# Patient Record
Sex: MALE | Race: BLACK OR AFRICAN AMERICAN | NOT HISPANIC OR LATINO | URBAN - METROPOLITAN AREA
[De-identification: names, ages, dates, MRNs, and addresses within clinical notes are randomized per-mention and may not be internally consistent; named-entity substitution may affect disease eponyms.]

---

## 2024-09-02 ENCOUNTER — HOSPITAL ENCOUNTER (INPATIENT)
Facility: HOSPITAL | Age: 44
LOS: 8 days | Discharge: HOME | DRG: 432 | End: 2024-09-10
Attending: EMERGENCY MEDICINE | Admitting: HOSPITALIST
Payer: COMMERCIAL

## 2024-09-02 DIAGNOSIS — K62.5 RECTAL BLEEDING: ICD-10-CM

## 2024-09-02 DIAGNOSIS — N17.9 AKI (ACUTE KIDNEY INJURY): ICD-10-CM

## 2024-09-02 DIAGNOSIS — D62 ABLA (ACUTE BLOOD LOSS ANEMIA): ICD-10-CM

## 2024-09-02 DIAGNOSIS — K70.31 ALCOHOLIC CIRRHOSIS OF LIVER WITH ASCITES: Primary | ICD-10-CM

## 2024-09-02 DIAGNOSIS — R18.8 ABDOMINAL ASCITES: ICD-10-CM

## 2024-09-02 DIAGNOSIS — K65.2 SPONTANEOUS BACTERIAL PERITONITIS: ICD-10-CM

## 2024-09-02 DIAGNOSIS — K76.7 HEPATORENAL SYNDROME: ICD-10-CM

## 2024-09-02 DIAGNOSIS — G40.909 SEIZURE DISORDER: ICD-10-CM

## 2024-09-02 PROBLEM — N18.9 CKD (CHRONIC KIDNEY DISEASE): Status: ACTIVE | Noted: 2024-09-02

## 2024-09-02 PROBLEM — E87.20 METABOLIC ACIDOSIS: Status: ACTIVE | Noted: 2024-09-02

## 2024-09-02 PROBLEM — D64.9 ANEMIA: Status: ACTIVE | Noted: 2024-09-02

## 2024-09-02 LAB
ABO + RH BLD: NORMAL
ALBUMIN SERPL BCP-MCNC: 2.4 G/DL (ref 3.5–5.2)
ALP SERPL-CCNC: 177 U/L (ref 55–135)
ALT SERPL W/O P-5'-P-CCNC: 68 U/L (ref 10–44)
AMMONIA PLAS-SCNC: 52 UMOL/L (ref 10–50)
ANION GAP SERPL CALC-SCNC: 14 MMOL/L (ref 8–16)
ANISOCYTOSIS BLD QL SMEAR: SLIGHT
APTT PPP: 29.3 SEC (ref 21–32)
AST SERPL-CCNC: 111 U/L (ref 10–40)
BASOPHILS # BLD AUTO: 0.05 K/UL (ref 0–0.2)
BASOPHILS NFR BLD: 0.1 % (ref 0–1.9)
BILIRUB SERPL-MCNC: 22.9 MG/DL (ref 0.1–1)
BILIRUB UR QL STRIP: ABNORMAL
BLD GP AB SCN CELLS X3 SERPL QL: NORMAL
BNP SERPL-MCNC: 139 PG/ML (ref 0–99)
BUN SERPL-MCNC: 61 MG/DL (ref 6–20)
BURR CELLS BLD QL SMEAR: ABNORMAL
CALCIUM SERPL-MCNC: 8.7 MG/DL (ref 8.7–10.5)
CHLORIDE SERPL-SCNC: 102 MMOL/L (ref 95–110)
CLARITY UR: CLEAR
CO2 SERPL-SCNC: 18 MMOL/L (ref 23–29)
COLOR UR: YELLOW
CREAT SERPL-MCNC: 4 MG/DL (ref 0.5–1.4)
DIFFERENTIAL METHOD BLD: ABNORMAL
EOSINOPHIL # BLD AUTO: 0.2 K/UL (ref 0–0.5)
EOSINOPHIL NFR BLD: 0.5 % (ref 0–8)
ERYTHROCYTE [DISTWIDTH] IN BLOOD BY AUTOMATED COUNT: 21.9 % (ref 11.5–14.5)
EST. GFR  (NO RACE VARIABLE): 18 ML/MIN/1.73 M^2
ETHANOL SERPL-MCNC: <10 MG/DL
GLUCOSE SERPL-MCNC: 122 MG/DL (ref 70–110)
GLUCOSE SERPL-MCNC: 144 MG/DL (ref 70–110)
GLUCOSE UR QL STRIP: NEGATIVE
HCT VFR BLD AUTO: 24.3 % (ref 40–54)
HGB BLD-MCNC: 8.1 G/DL (ref 14–18)
HGB UR QL STRIP: NEGATIVE
HYPOCHROMIA BLD QL SMEAR: ABNORMAL
IMM GRANULOCYTES # BLD AUTO: 0.35 K/UL (ref 0–0.04)
IMM GRANULOCYTES NFR BLD AUTO: 1 % (ref 0–0.5)
INR PPP: 1.8 (ref 0.8–1.2)
KETONES UR QL STRIP: NEGATIVE
LEUKOCYTE ESTERASE UR QL STRIP: NEGATIVE
LYMPHOCYTES # BLD AUTO: 0.5 K/UL (ref 1–4.8)
LYMPHOCYTES NFR BLD: 1.5 % (ref 18–48)
MCH RBC QN AUTO: 32 PG (ref 27–31)
MCHC RBC AUTO-ENTMCNC: 33.3 G/DL (ref 32–36)
MCV RBC AUTO: 96 FL (ref 82–98)
MONOCYTES # BLD AUTO: 1.5 K/UL (ref 0.3–1)
MONOCYTES NFR BLD: 4.2 % (ref 4–15)
NEUTROPHILS # BLD AUTO: 33.5 K/UL (ref 1.8–7.7)
NEUTROPHILS NFR BLD: 92.7 % (ref 38–73)
NITRITE UR QL STRIP: NEGATIVE
NRBC BLD-RTO: 0 /100 WBC
PH UR STRIP: 6 [PH] (ref 5–8)
PLATELET # BLD AUTO: 163 K/UL (ref 150–450)
PLATELET BLD QL SMEAR: ABNORMAL
PMV BLD AUTO: 11.4 FL (ref 9.2–12.9)
POIKILOCYTOSIS BLD QL SMEAR: SLIGHT
POTASSIUM SERPL-SCNC: 3.7 MMOL/L (ref 3.5–5.1)
PROCALCITONIN SERPL IA-MCNC: 0.87 NG/ML (ref 0–0.5)
PROT SERPL-MCNC: 4.9 G/DL (ref 6–8.4)
PROT UR QL STRIP: ABNORMAL
PROTHROMBIN TIME: 18.2 SEC (ref 9–12.5)
RBC # BLD AUTO: 2.53 M/UL (ref 4.6–6.2)
SCHISTOCYTES BLD QL SMEAR: ABNORMAL
SCHISTOCYTES BLD QL SMEAR: PRESENT
SODIUM SERPL-SCNC: 134 MMOL/L (ref 136–145)
SP GR UR STRIP: 1.01 (ref 1–1.03)
SPECIMEN OUTDATE: NORMAL
SPHEROCYTES BLD QL SMEAR: ABNORMAL
TARGETS BLD QL SMEAR: ABNORMAL
TROPONIN I SERPL DL<=0.01 NG/ML-MCNC: 0.03 NG/ML (ref 0–0.03)
TROPONIN I SERPL HS-MCNC: 33.3 PG/ML (ref 0–14.9)
URN SPEC COLLECT METH UR: ABNORMAL
UROBILINOGEN UR STRIP-ACNC: NEGATIVE EU/DL
WBC # BLD AUTO: 36.13 K/UL (ref 3.9–12.7)

## 2024-09-02 PROCEDURE — 25000003 PHARM REV CODE 250: Performed by: HOSPITALIST

## 2024-09-02 PROCEDURE — 25000003 PHARM REV CODE 250: Performed by: EMERGENCY MEDICINE

## 2024-09-02 PROCEDURE — 83540 ASSAY OF IRON: CPT | Performed by: STUDENT IN AN ORGANIZED HEALTH CARE EDUCATION/TRAINING PROGRAM

## 2024-09-02 PROCEDURE — 99285 EMERGENCY DEPT VISIT HI MDM: CPT | Mod: 25

## 2024-09-02 PROCEDURE — 63600175 PHARM REV CODE 636 W HCPCS: Performed by: STUDENT IN AN ORGANIZED HEALTH CARE EDUCATION/TRAINING PROGRAM

## 2024-09-02 PROCEDURE — 85610 PROTHROMBIN TIME: CPT | Performed by: EMERGENCY MEDICINE

## 2024-09-02 PROCEDURE — 83880 ASSAY OF NATRIURETIC PEPTIDE: CPT | Performed by: EMERGENCY MEDICINE

## 2024-09-02 PROCEDURE — 99900035 HC TECH TIME PER 15 MIN (STAT)

## 2024-09-02 PROCEDURE — 80053 COMPREHEN METABOLIC PANEL: CPT | Performed by: EMERGENCY MEDICINE

## 2024-09-02 PROCEDURE — 93005 ELECTROCARDIOGRAM TRACING: CPT

## 2024-09-02 PROCEDURE — 93010 ELECTROCARDIOGRAM REPORT: CPT | Mod: ,,, | Performed by: GENERAL PRACTICE

## 2024-09-02 PROCEDURE — 85730 THROMBOPLASTIN TIME PARTIAL: CPT | Performed by: EMERGENCY MEDICINE

## 2024-09-02 PROCEDURE — 36415 COLL VENOUS BLD VENIPUNCTURE: CPT | Performed by: EMERGENCY MEDICINE

## 2024-09-02 PROCEDURE — 82607 VITAMIN B-12: CPT | Performed by: STUDENT IN AN ORGANIZED HEALTH CARE EDUCATION/TRAINING PROGRAM

## 2024-09-02 PROCEDURE — 86900 BLOOD TYPING SEROLOGIC ABO: CPT | Performed by: EMERGENCY MEDICINE

## 2024-09-02 PROCEDURE — 84484 ASSAY OF TROPONIN QUANT: CPT | Performed by: EMERGENCY MEDICINE

## 2024-09-02 PROCEDURE — 94799 UNLISTED PULMONARY SVC/PX: CPT

## 2024-09-02 PROCEDURE — 63600175 PHARM REV CODE 636 W HCPCS: Performed by: EMERGENCY MEDICINE

## 2024-09-02 PROCEDURE — 84484 ASSAY OF TROPONIN QUANT: CPT | Mod: 91 | Performed by: EMERGENCY MEDICINE

## 2024-09-02 PROCEDURE — 36415 COLL VENOUS BLD VENIPUNCTURE: CPT | Performed by: STUDENT IN AN ORGANIZED HEALTH CARE EDUCATION/TRAINING PROGRAM

## 2024-09-02 PROCEDURE — 20000000 HC ICU ROOM

## 2024-09-02 PROCEDURE — 81003 URINALYSIS AUTO W/O SCOPE: CPT | Performed by: EMERGENCY MEDICINE

## 2024-09-02 PROCEDURE — 85025 COMPLETE CBC W/AUTO DIFF WBC: CPT | Performed by: EMERGENCY MEDICINE

## 2024-09-02 PROCEDURE — 87040 BLOOD CULTURE FOR BACTERIA: CPT | Mod: 59 | Performed by: EMERGENCY MEDICINE

## 2024-09-02 PROCEDURE — 86901 BLOOD TYPING SEROLOGIC RH(D): CPT | Performed by: EMERGENCY MEDICINE

## 2024-09-02 PROCEDURE — 82077 ASSAY SPEC XCP UR&BREATH IA: CPT | Performed by: EMERGENCY MEDICINE

## 2024-09-02 PROCEDURE — 99900031 HC PATIENT EDUCATION (STAT)

## 2024-09-02 PROCEDURE — 82728 ASSAY OF FERRITIN: CPT | Performed by: STUDENT IN AN ORGANIZED HEALTH CARE EDUCATION/TRAINING PROGRAM

## 2024-09-02 PROCEDURE — 96365 THER/PROPH/DIAG IV INF INIT: CPT

## 2024-09-02 PROCEDURE — 84484 ASSAY OF TROPONIN QUANT: CPT | Mod: 91 | Performed by: STUDENT IN AN ORGANIZED HEALTH CARE EDUCATION/TRAINING PROGRAM

## 2024-09-02 PROCEDURE — P9047 ALBUMIN (HUMAN), 25%, 50ML: HCPCS | Mod: JZ,JG | Performed by: STUDENT IN AN ORGANIZED HEALTH CARE EDUCATION/TRAINING PROGRAM

## 2024-09-02 PROCEDURE — 82140 ASSAY OF AMMONIA: CPT | Performed by: EMERGENCY MEDICINE

## 2024-09-02 PROCEDURE — 94761 N-INVAS EAR/PLS OXIMETRY MLT: CPT

## 2024-09-02 PROCEDURE — 84145 PROCALCITONIN (PCT): CPT | Performed by: EMERGENCY MEDICINE

## 2024-09-02 PROCEDURE — 82746 ASSAY OF FOLIC ACID SERUM: CPT | Performed by: STUDENT IN AN ORGANIZED HEALTH CARE EDUCATION/TRAINING PROGRAM

## 2024-09-02 PROCEDURE — 25000003 PHARM REV CODE 250: Performed by: STUDENT IN AN ORGANIZED HEALTH CARE EDUCATION/TRAINING PROGRAM

## 2024-09-02 RX ORDER — ALBUMIN HUMAN 250 G/1000ML
25 SOLUTION INTRAVENOUS ONCE
Status: COMPLETED | OUTPATIENT
Start: 2024-09-02 | End: 2024-09-02

## 2024-09-02 RX ORDER — ONDANSETRON HYDROCHLORIDE 2 MG/ML
4 INJECTION, SOLUTION INTRAVENOUS EVERY 6 HOURS PRN
Status: DISCONTINUED | OUTPATIENT
Start: 2024-09-02 | End: 2024-09-10 | Stop reason: HOSPADM

## 2024-09-02 RX ORDER — SODIUM,POTASSIUM PHOSPHATES 280-250MG
2 POWDER IN PACKET (EA) ORAL
Status: DISCONTINUED | OUTPATIENT
Start: 2024-09-02 | End: 2024-09-10 | Stop reason: HOSPADM

## 2024-09-02 RX ORDER — LACTULOSE 10 G/15ML
20 SOLUTION ORAL 3 TIMES DAILY
Status: DISCONTINUED | OUTPATIENT
Start: 2024-09-02 | End: 2024-09-10 | Stop reason: HOSPADM

## 2024-09-02 RX ORDER — THIAMINE HCL 100 MG
100 TABLET ORAL DAILY
Status: DISCONTINUED | OUTPATIENT
Start: 2024-09-03 | End: 2024-09-10 | Stop reason: HOSPADM

## 2024-09-02 RX ORDER — ALBUMIN HUMAN 250 G/1000ML
25 SOLUTION INTRAVENOUS ONCE
Status: DISCONTINUED | OUTPATIENT
Start: 2024-09-02 | End: 2024-09-02

## 2024-09-02 RX ORDER — MIDODRINE HYDROCHLORIDE 10 MG/1
10 TABLET ORAL EVERY 8 HOURS
Status: DISCONTINUED | OUTPATIENT
Start: 2024-09-02 | End: 2024-09-10 | Stop reason: HOSPADM

## 2024-09-02 RX ORDER — MUPIROCIN 20 MG/G
OINTMENT TOPICAL 2 TIMES DAILY
Status: DISPENSED | OUTPATIENT
Start: 2024-09-02 | End: 2024-09-07

## 2024-09-02 RX ORDER — PANTOPRAZOLE SODIUM 40 MG/10ML
40 INJECTION, POWDER, LYOPHILIZED, FOR SOLUTION INTRAVENOUS 2 TIMES DAILY
Status: DISCONTINUED | OUTPATIENT
Start: 2024-09-02 | End: 2024-09-05

## 2024-09-02 RX ORDER — PREDNISONE 20 MG/1
40 TABLET ORAL DAILY
Status: DISCONTINUED | OUTPATIENT
Start: 2024-09-03 | End: 2024-09-10 | Stop reason: HOSPADM

## 2024-09-02 RX ORDER — LANOLIN ALCOHOL/MO/W.PET/CERES
800 CREAM (GRAM) TOPICAL
Status: DISCONTINUED | OUTPATIENT
Start: 2024-09-02 | End: 2024-09-10 | Stop reason: HOSPADM

## 2024-09-02 RX ORDER — FOLIC ACID 1 MG/1
1 TABLET ORAL DAILY
Status: DISCONTINUED | OUTPATIENT
Start: 2024-09-03 | End: 2024-09-10 | Stop reason: HOSPADM

## 2024-09-02 RX ORDER — SODIUM BICARBONATE 650 MG/1
650 TABLET ORAL 3 TIMES DAILY
Status: DISCONTINUED | OUTPATIENT
Start: 2024-09-02 | End: 2024-09-08

## 2024-09-02 RX ORDER — LEVETIRACETAM 5 MG/ML
500 INJECTION INTRAVASCULAR EVERY 12 HOURS
Status: DISCONTINUED | OUTPATIENT
Start: 2024-09-02 | End: 2024-09-05

## 2024-09-02 RX ORDER — OCTREOTIDE ACETATE 100 UG/ML
100 INJECTION, SOLUTION INTRAVENOUS; SUBCUTANEOUS EVERY 8 HOURS
Status: DISCONTINUED | OUTPATIENT
Start: 2024-09-02 | End: 2024-09-05

## 2024-09-02 RX ORDER — OXYCODONE HYDROCHLORIDE 5 MG/1
5 TABLET ORAL EVERY 4 HOURS PRN
Status: DISCONTINUED | OUTPATIENT
Start: 2024-09-02 | End: 2024-09-10 | Stop reason: HOSPADM

## 2024-09-02 RX ORDER — ALBUMIN HUMAN 250 G/1000ML
25 SOLUTION INTRAVENOUS 2 TIMES DAILY
Status: DISCONTINUED | OUTPATIENT
Start: 2024-09-03 | End: 2024-09-10 | Stop reason: HOSPADM

## 2024-09-02 RX ADMIN — PANTOPRAZOLE SODIUM 40 MG: 40 INJECTION, POWDER, FOR SOLUTION INTRAVENOUS at 10:09

## 2024-09-02 RX ADMIN — OCTREOTIDE ACETATE 100 MCG: 100 INJECTION, SOLUTION INTRAVENOUS; SUBCUTANEOUS at 10:09

## 2024-09-02 RX ADMIN — MIDODRINE HYDROCHLORIDE 10 MG: 10 TABLET ORAL at 10:09

## 2024-09-02 RX ADMIN — CEFTRIAXONE 1 G: 1 INJECTION, POWDER, FOR SOLUTION INTRAMUSCULAR; INTRAVENOUS at 05:09

## 2024-09-02 RX ADMIN — MUPIROCIN 1 G: 20 OINTMENT TOPICAL at 10:09

## 2024-09-02 RX ADMIN — SODIUM BICARBONATE 650 MG TABLET 650 MG: at 10:09

## 2024-09-02 RX ADMIN — ALBUMIN (HUMAN) 25 G: 12.5 SOLUTION INTRAVENOUS at 10:09

## 2024-09-02 RX ADMIN — LEVETIRACETAM INJECTION 500 MG: 5 INJECTION INTRAVENOUS at 10:09

## 2024-09-02 NOTE — ED PROVIDER NOTES
Encounter Date: 9/2/2024       History     Chief Complaint   Patient presents with    Jaundice    Rectal Bleeding     44-year-old male with history of alcoholic cirrhotic liver disease, long-term alcohol abuse last drink states was a proximally 1-1/2 months ago, seizure disorder, iron-deficiency anemia, chronic blood loss, chronic kidney disease, hepatorenal syndrome, patient last hospital admission at [facility] on 08/28/2024 4 GI bleed at that time underwent 1 unit of packed red blood cells.  Patient did undergo discussion of tips procedure which is still pending.  Patient returns to the emergency department today due to his family was concerned that he was becoming more jaundice and having abdominal distention.  Patient has had intermittent rectal bleeding last time he had bleeding was proximally 1 week ago.  Came to the emergency department today for further evaluation.  Denies any abdominal pain, no fever, no hematemesis, no hematochezia no rectal bleeding or melena at this time.      Review of patient's allergies indicates:  No Known Allergies  No past medical history on file.  No past surgical history on file.  No family history on file.     Review of Systems   Constitutional:  Positive for fatigue. Negative for fever.   HENT:  Negative for sore throat.    Respiratory:  Negative for shortness of breath.    Cardiovascular:  Negative for chest pain.   Gastrointestinal:  Positive for abdominal distention. Negative for abdominal pain, nausea and vomiting.   Genitourinary:  Negative for dysuria.   Musculoskeletal:  Negative for back pain.   Skin:  Negative for rash.   Neurological:  Positive for weakness. Negative for dizziness and headaches.   Hematological:  Does not bruise/bleed easily.       Physical Exam     Initial Vitals [09/02/24 1447]   BP Pulse Resp Temp SpO2   108/74 (!) 160 18 96.5 °F (35.8 °C) 96 %      MAP       --         Physical Exam    Nursing note and vitals reviewed.  Constitutional: He  appears well-developed and well-nourished.   Frail appearing male, no acute distress with jaundice   HENT:   Head: Normocephalic and atraumatic.   Nose: Nose normal.   Mouth/Throat: Oropharynx is clear and moist.   Eyes: Conjunctivae and EOM are normal. Pupils are equal, round, and reactive to light. Right eye exhibits no discharge. Left eye exhibits no discharge. No scleral icterus.   Neck: Neck supple.   Normal range of motion.  Cardiovascular:  Normal rate, regular rhythm, normal heart sounds and intact distal pulses.     Exam reveals no gallop and no friction rub.       No murmur heard.  Pulmonary/Chest: No stridor. No respiratory distress.   Course bilateral breath sounds no adventitious sounds   Abdominal: Abdomen is soft. Bowel sounds are normal. He exhibits distension. He exhibits no mass. There is no abdominal tenderness.   Markedly tense abdominal ascites with marked abdominal distention.  No rebound, no guarding noted.  Normal Bowel sounds There is no rebound and no guarding.   Musculoskeletal:         General: No edema. Normal range of motion.      Cervical back: Normal range of motion and neck supple.     Lymphadenopathy:     He has no cervical adenopathy.   Neurological: He is alert and oriented to person, place, and time. He has normal strength and normal reflexes. No cranial nerve deficit or sensory deficit. GCS score is 15. GCS eye subscore is 4. GCS verbal subscore is 5. GCS motor subscore is 6.   Skin: Skin is warm and dry. Capillary refill takes less than 2 seconds. No rash noted.   Psychiatric: He has a normal mood and affect. His behavior is normal. Judgment and thought content normal.         ED Course   Procedures  Labs Reviewed   CBC W/ AUTO DIFFERENTIAL - Abnormal       Result Value    WBC 36.13 (*)     RBC 2.53 (*)     Hemoglobin 8.1 (*)     Hematocrit 24.3 (*)     MCV 96      MCH 32.0 (*)     MCHC 33.3      RDW 21.9 (*)     Platelets 163      MPV 11.4      Immature Granulocytes 1.0 (*)      Gran # (ANC) 33.5 (*)     Immature Grans (Abs) 0.35 (*)     Lymph # 0.5 (*)     Mono # 1.5 (*)     Eos # 0.2      Baso # 0.05      nRBC 0      Gran % 92.7 (*)     Lymph % 1.5 (*)     Mono % 4.2      Eosinophil % 0.5      Basophil % 0.1      Platelet Estimate Appears normal      Aniso Slight      Poik Slight      Hypo Occasional      Target Cells Occasional      [place] Cells Occasional      Spherocytes Occasional      Schistocytes Present      Fragmented Cells Occasional      Differential Method Automated      Narrative:      WBC  critical result(s) called and verbal readback obtained from   Brett Mehta by BTI1 09/02/2024 15:52   COMPREHENSIVE METABOLIC PANEL - Abnormal    Sodium 134 (*)     Potassium 3.7      Chloride 102      CO2 18 (*)     Glucose 122 (*)     BUN 61 (*)     Creatinine 4.0 (*)     Calcium 8.7      Total Protein 4.9 (*)     Albumin 2.4 (*)     Total Bilirubin 22.9 (*)     Alkaline Phosphatase 177 (*)      (*)     ALT 68 (*)     eGFR 18 (*)     Anion Gap 14     TROPONIN I - Abnormal    Troponin I 0.030 (*)    B-TYPE NATRIURETIC PEPTIDE - Abnormal     (*)    PROTIME-INR - Abnormal    Prothrombin Time 18.2 (*)     INR 1.8 (*)    AMMONIA - Abnormal    Ammonia 52 (*)    PROCALCITONIN - Abnormal    Procalcitonin 0.87 (*)    CULTURE, BLOOD   CULTURE, BLOOD   APTT    aPTT 29.3     ALCOHOL,MEDICAL (ETHANOL)    Alcohol, Serum <10     URINALYSIS, REFLEX TO URINE CULTURE   TROPONIN I   TYPE & SCREEN    Group & Rh A POS      Indirect Amanda NEG      Specimen Outdate 09/05/2024 23:59     POCT LACTATE          Imaging Results              US Abdomen Limited (Final result)  Result time 09/02/24 16:47:38   Procedure changed from US Abdomen Complete     Final result by Reva Méndez MD (09/02/24 16:47:38)                   Impression:      As above      Electronically signed by: Reva Méndez MD  Date:    09/02/2024  Time:    16:47               Narrative:    EXAMINATION:  US ABDOMEN  LIMITED    CLINICAL HISTORY:  ascites; Other ascites    TECHNIQUE:  Limited ultrasound of the abdomen for ascites    COMPARISON:  None.    FINDINGS:  There is ascites seen in all 4 quadrants with moderately large amount ascites seen.  .                                       CT Abdomen Pelvis  Without Contrast (Final result)  Result time 09/02/24 16:44:36      Final result by Reva Méndez MD (09/02/24 16:44:36)                   Impression:      Features of cirrhosis with ascites and nodular contour of the liver.  Mild diffuse bladder wall thickening versus incomplete distention recommend correlation clinically if clinical consideration for cystitis or chronic bladder outlet obstruction.  Diverticulosis without CT findings of acute diverticulitis.  Findings detailed above.      Electronically signed by: Reva Méndez MD  Date:    09/02/2024  Time:    16:44               Narrative:    EXAMINATION:  CT ABDOMEN PELVIS WITHOUT CONTRAST    CLINICAL HISTORY:  abdominal distention;    TECHNIQUE:  Low dose axial images, sagittal and coronal reformations were obtained from the lung bases to the pubic symphysis.  No p.o. contrast    COMPARISON:  None    FINDINGS:  Mild dependent hypoventilatory changes in visualized lung bases    Large volume of ascites.  Nodular contour the liver suggesting cirrhosis.  Suggestion of recanalization of the umbilical vein.  Spleen not enlarged. Splenule noted. Adrenal glands; slight diffuse thickening.    Pancreas unremarkable    Abdominal aorta no aneurysm    No calcified stones the gallbladder or CT findings of acute cholecystitis or biliary duct dilatation    Diverticulosis without CT findings of acute diverticulitis. Appendix; normal appearing appendix is thought to be visualized.    Kidney; no hydronephrosis or opaque renal stone.  No ureteral dilatation, opaque ureteral stone or ureteral obstruction evident.  The urinary bladder is mildly distended at time of the exam. Wall appears  mildly diffusely thickened recommend correlation clinically if clinical consideration for cystitis or chronic bladder outlet obstruction.  The prostate gland does not appear enlarged. There is small left inguinal hernia containing ascites and fat.    Subcutaneous edema.                                       Medications   cefTRIAXone (Rocephin) 1 g in D5W 100 mL IVPB (MB+) (0 g Intravenous Stopped 9/2/24 1740)     Medical Decision Making  Amount and/or Complexity of Data Reviewed  Labs: ordered.  Radiology: ordered.    Risk  Decision regarding hospitalization.              Attending Attestation:   Physician Attestation Statement for Resident:  As the supervising MD  .  -: 44-year-old male with history of alcoholic cirrhotic liver disease, long-term alcohol abuse last drink states was a proximally 1-1/2 months ago, seizure disorder, iron-deficiency anemia, chronic blood loss, chronic kidney disease, hepatorenal syndrome, patient last hospital admission at [facility] on 08/28/2024 4 GI bleed at that time underwent 1 unit of packed red blood cells.  Patient did undergo discussion of tips procedure which is still pending.  Patient returns to the emergency department today due to his family was concerned that he was becoming more jaundice and having abdominal distention.  Patient has had intermittent rectal bleeding last time he had bleeding was proximally 1 week ago.  Came to the emergency department today for further evaluation.  Denies any abdominal pain, no fever, no hematemesis, no hematochezia no rectal bleeding or melena at this time.       -: End-stage liver disease, alcoholic cirrhotic liver disease, ascites with abdominal distention, worsening anemia/symptomatic anemia            Attending Critical Care:   Critical Care Times:   Direct Patient Care (initial evaluation, reassessments, and time considering the case)................................................................30 minutes.   Additional  History from reviewing old medical records or taking additional history from the family, EMS, PCP, etc.......................5 minutes.   Ordering, Reviewing, and Interpreting Diagnostic Studies...............................................................................................................5 minutes.   Documentation..................................................................................................................................................................................5 minutes.   Consultation with other Physicians. .................................................................................................................................................5 minutes.   Consultation with the patient's family directly relating to the patient's condition, care, and DNR status (when patient unable)......5 minutes.   Other..................................................................................................................................................................................................5 minutes.   ==============================================================  Total Critical Care Time - exclusive of procedural time: 60 minutes.  ==============================================================  Critical care was necessary to treat or prevent imminent or life-threatening deterioration of the following conditions: hepatic failure.   Critical care was time spent personally by me on the following activities: obtaining history from patient or relative, review of old charts, review of x-rays / CT sent with the patient, examination of patient, ordering lab, x-rays, and/or EKG, development of treatment plan with patient or relative, ordering and performing treatments and interventions, evaluation of patient's response to treatment, discussion with consultants and re-evaluation of patient's conition.   Critical Care Condition: potentially life-threatening           ED  Course as of 09/02/24 1757   Mon Sep 02, 2024   1752 Patient seen evaluated emergency department.  Currently at this time patient found with leukocytosis and increasing abdominal ascites.  Patient here with generalized fatigue.  White count found to be 36 1000, with H&H 8 and 24 platelet count of 163.  Profile 0.87.  BUN 61, creatinine 4.0 bilirubin 22 ALT 68  alk-phos 177 ammonia 52 INR 1.8.  CT abdomen pelvis with chronic cirrhotic liver disease finding.  Ultrasound with abdominal ascites.  Case discussed with Gastroenterology at [facility] Dr. Gaitan.  Recommends patient be transferred to [facility] for interventional radiology paracentesis, broad-spectrum IV antibiotic therapy as well as albumin infusion.  Acceptance has been granted by Dr. Dias.  Currently awaiting .  [RM]      ED Course User Index  [RM] Balbir Courtney MD               Medical Decision Making:   Initial Assessment:   44-year-old male with history of alcoholic cirrhotic liver disease, long-term alcohol abuse last drink states was a proximally 1-1/2 months ago, seizure disorder, iron-deficiency anemia, chronic blood loss, chronic kidney disease, hepatorenal syndrome, patient last hospital admission at [facility] on 08/28/2024 4 GI bleed at that time underwent 1 unit of packed red blood cells.  Patient did undergo discussion of tips procedure which is still pending.  Patient returns to the emergency department today due to his family was concerned that he was becoming more jaundice and having abdominal distention.  Patient has had intermittent rectal bleeding last time he had bleeding was proximally 1 week ago.  Came to the emergency department today for further evaluation.  Denies any abdominal pain, no fever, no hematemesis, no hematochezia no rectal bleeding or melena at this time.    Differential Diagnosis:   Spontaneous bacterial peritonitis, hepatorenal syndrome, GI bleed, liver  "failure  Clinical Tests:   Lab Tests: Ordered and Reviewed  Radiological Study: Ordered and Reviewed  Medical Tests: Ordered and Reviewed  Sepsis Perfusion Assessment: "I attest a sepsis perfusion exam was performed within 6 hours of sepsis, severe sepsis, or septic shock presentation, following fluid resuscitation."             Clinical Impression:  Final diagnoses:  [K70.31] Alcoholic cirrhosis of liver with ascites (Primary)  [R18.8] Abdominal ascites  [K65.2] Spontaneous bacterial peritonitis  [K76.7] Hepatorenal syndrome          ED Disposition Condition    Admit Stable                Balbir Courtney MD  09/02/24 1536       Balbir Courtney MD  09/02/24 9676    "

## 2024-09-03 ENCOUNTER — ANESTHESIA (OUTPATIENT)
Dept: SURGERY | Facility: HOSPITAL | Age: 44
End: 2024-09-03
Payer: COMMERCIAL

## 2024-09-03 ENCOUNTER — ANESTHESIA EVENT (OUTPATIENT)
Dept: SURGERY | Facility: HOSPITAL | Age: 44
End: 2024-09-03
Payer: COMMERCIAL

## 2024-09-03 VITALS
SYSTOLIC BLOOD PRESSURE: 109 MMHG | HEART RATE: 66 BPM | RESPIRATION RATE: 11 BRPM | OXYGEN SATURATION: 100 % | DIASTOLIC BLOOD PRESSURE: 75 MMHG

## 2024-09-03 PROBLEM — K76.82 HEPATIC ENCEPHALOPATHY: Status: ACTIVE | Noted: 2024-09-03

## 2024-09-03 LAB
ABO + RH BLD: NORMAL
ALBUMIN FLD-MCNC: <1.5 G/DL
ALBUMIN SERPL BCP-MCNC: 2.7 G/DL (ref 3.5–5.2)
ALP SERPL-CCNC: 120 U/L (ref 55–135)
ALT SERPL W/O P-5'-P-CCNC: 50 U/L (ref 10–44)
AMMONIA PLAS-SCNC: 62 UMOL/L (ref 10–50)
ANION GAP SERPL CALC-SCNC: 11 MMOL/L (ref 8–16)
ANISOCYTOSIS BLD QL SMEAR: SLIGHT
APPEARANCE FLD: CLEAR
AST SERPL-CCNC: 74 U/L (ref 10–40)
BASOPHILS # BLD AUTO: 0.03 K/UL (ref 0–0.2)
BASOPHILS # BLD AUTO: 0.04 K/UL (ref 0–0.2)
BASOPHILS NFR BLD: 0.1 % (ref 0–1.9)
BASOPHILS NFR BLD: 0.1 % (ref 0–1.9)
BILIRUB SERPL-MCNC: 20.3 MG/DL (ref 0.1–1)
BLD GP AB SCN CELLS X3 SERPL QL: NORMAL
BLD PROD TYP BPU: NORMAL
BLD PROD TYP BPU: NORMAL
BLOOD UNIT EXPIRATION DATE: NORMAL
BLOOD UNIT EXPIRATION DATE: NORMAL
BLOOD UNIT TYPE CODE: 6200
BLOOD UNIT TYPE CODE: 6200
BLOOD UNIT TYPE: NORMAL
BLOOD UNIT TYPE: NORMAL
BODY FLD TYPE: NORMAL
BUN SERPL-MCNC: 64 MG/DL (ref 6–20)
CALCIUM SERPL-MCNC: 7.8 MG/DL (ref 8.7–10.5)
CHLORIDE SERPL-SCNC: 103 MMOL/L (ref 95–110)
CO2 SERPL-SCNC: 19 MMOL/L (ref 23–29)
CODING SYSTEM: NORMAL
CODING SYSTEM: NORMAL
COLOR FLD: YELLOW
CREAT SERPL-MCNC: 3.9 MG/DL (ref 0.5–1.4)
CREAT UR-MCNC: 83.2 MG/DL (ref 23–375)
CROSSMATCH INTERPRETATION: NORMAL
CROSSMATCH INTERPRETATION: NORMAL
DIFFERENTIAL METHOD BLD: ABNORMAL
DIFFERENTIAL METHOD BLD: ABNORMAL
DISPENSE STATUS: NORMAL
DISPENSE STATUS: NORMAL
EOSINOPHIL # BLD AUTO: 0 K/UL (ref 0–0.5)
EOSINOPHIL # BLD AUTO: 0 K/UL (ref 0–0.5)
EOSINOPHIL NFR BLD: 0 % (ref 0–8)
EOSINOPHIL NFR BLD: 0.1 % (ref 0–8)
ERYTHROCYTE [DISTWIDTH] IN BLOOD BY AUTOMATED COUNT: 20.7 % (ref 11.5–14.5)
ERYTHROCYTE [DISTWIDTH] IN BLOOD BY AUTOMATED COUNT: 21.8 % (ref 11.5–14.5)
EST. GFR  (NO RACE VARIABLE): 18.6 ML/MIN/1.73 M^2
FERRITIN SERPL-MCNC: 268.8 NG/ML (ref 20–300)
FOLATE SERPL-MCNC: 20.9 NG/ML (ref 4–24)
GLUCOSE SERPL-MCNC: 158 MG/DL (ref 70–110)
HCT VFR BLD AUTO: 19 % (ref 40–54)
HCT VFR BLD AUTO: 27.5 % (ref 40–54)
HGB BLD-MCNC: 6.2 G/DL (ref 14–18)
HGB BLD-MCNC: 9.2 G/DL (ref 14–18)
HYPOCHROMIA BLD QL SMEAR: ABNORMAL
IMM GRANULOCYTES # BLD AUTO: 0.23 K/UL (ref 0–0.04)
IMM GRANULOCYTES # BLD AUTO: 0.36 K/UL (ref 0–0.04)
IMM GRANULOCYTES NFR BLD AUTO: 0.7 % (ref 0–0.5)
IMM GRANULOCYTES NFR BLD AUTO: 1 % (ref 0–0.5)
INR PPP: 1.9 (ref 0.8–1.2)
IRON SERPL-MCNC: 37 UG/DL (ref 45–160)
LYMPHOCYTES # BLD AUTO: 0.4 K/UL (ref 1–4.8)
LYMPHOCYTES # BLD AUTO: 0.6 K/UL (ref 1–4.8)
LYMPHOCYTES NFR BLD: 1.2 % (ref 18–48)
LYMPHOCYTES NFR BLD: 1.8 % (ref 18–48)
LYMPHOCYTES NFR FLD MANUAL: 22 %
MCH RBC QN AUTO: 30.8 PG (ref 27–31)
MCH RBC QN AUTO: 32 PG (ref 27–31)
MCHC RBC AUTO-ENTMCNC: 32.6 G/DL (ref 32–36)
MCHC RBC AUTO-ENTMCNC: 33.5 G/DL (ref 32–36)
MCV RBC AUTO: 92 FL (ref 82–98)
MCV RBC AUTO: 98 FL (ref 82–98)
MESOTHL CELL NFR FLD MANUAL: 7 %
MONOCYTES # BLD AUTO: 0.9 K/UL (ref 0.3–1)
MONOCYTES # BLD AUTO: 1.2 K/UL (ref 0.3–1)
MONOCYTES NFR BLD: 2.6 % (ref 4–15)
MONOCYTES NFR BLD: 3.8 % (ref 4–15)
MONOS+MACROS NFR FLD MANUAL: 50 %
NEUTROPHILS # BLD AUTO: 29.5 K/UL (ref 1.8–7.7)
NEUTROPHILS # BLD AUTO: 33.2 K/UL (ref 1.8–7.7)
NEUTROPHILS NFR BLD: 93.5 % (ref 38–73)
NEUTROPHILS NFR BLD: 95.1 % (ref 38–73)
NEUTROPHILS NFR FLD MANUAL: 21 %
NRBC BLD-RTO: 0 /100 WBC
NRBC BLD-RTO: 0 /100 WBC
NUM UNITS TRANS PACKED RBC: NORMAL
NUM UNITS TRANS PACKED RBC: NORMAL
OHS QRS DURATION: 88 MS
OHS QTC CALCULATION: 420 MS
PLATELET # BLD AUTO: 117 K/UL (ref 150–450)
PLATELET # BLD AUTO: 124 K/UL (ref 150–450)
PMV BLD AUTO: 11.9 FL (ref 9.2–12.9)
PMV BLD AUTO: 12 FL (ref 9.2–12.9)
POIKILOCYTOSIS BLD QL SMEAR: SLIGHT
POTASSIUM SERPL-SCNC: 4.1 MMOL/L (ref 3.5–5.1)
PROT SERPL-MCNC: 4 G/DL (ref 6–8.4)
PROT UR-MCNC: 37 MG/DL (ref 6–15)
PROT/CREAT UR: 0.44 MG/G{CREAT} (ref 0–0.2)
PROTHROMBIN TIME: 19.9 SEC (ref 9–12.5)
RBC # BLD AUTO: 1.94 M/UL (ref 4.6–6.2)
RBC # BLD AUTO: 2.99 M/UL (ref 4.6–6.2)
SATURATED IRON: ABNORMAL % (ref 20–50)
SCHISTOCYTES BLD QL SMEAR: PRESENT
SODIUM SERPL-SCNC: 133 MMOL/L (ref 136–145)
SODIUM UR-SCNC: 33 MMOL/L (ref 20–250)
SODIUM UR-SCNC: 33 MMOL/L (ref 20–250)
SPECIMEN OUTDATE: NORMAL
SPECIMEN SOURCE: 0
TOTAL IRON BINDING CAPACITY: ABNORMAL UG/DL (ref 250–450)
TRANSFERRIN SERPL-MCNC: <75 MG/DL (ref 200–375)
TROPONIN I SERPL HS-MCNC: 31.8 PG/ML (ref 0–14.9)
UUN UR-MCNC: 758 MG/DL (ref 140–1050)
VIT B12 SERPL-MCNC: 1446 PG/ML (ref 210–950)
WBC # BLD AUTO: 31.56 K/UL (ref 3.9–12.7)
WBC # BLD AUTO: 34.93 K/UL (ref 3.9–12.7)
WBC # FLD: 17 /CU MM

## 2024-09-03 PROCEDURE — 85025 COMPLETE CBC W/AUTO DIFF WBC: CPT | Performed by: STUDENT IN AN ORGANIZED HEALTH CARE EDUCATION/TRAINING PROGRAM

## 2024-09-03 PROCEDURE — 87205 SMEAR GRAM STAIN: CPT | Performed by: STUDENT IN AN ORGANIZED HEALTH CARE EDUCATION/TRAINING PROGRAM

## 2024-09-03 PROCEDURE — 0DJD8ZZ INSPECTION OF LOWER INTESTINAL TRACT, VIA NATURAL OR ARTIFICIAL OPENING ENDOSCOPIC: ICD-10-PCS | Performed by: INTERNAL MEDICINE

## 2024-09-03 PROCEDURE — 36415 COLL VENOUS BLD VENIPUNCTURE: CPT | Performed by: STUDENT IN AN ORGANIZED HEALTH CARE EDUCATION/TRAINING PROGRAM

## 2024-09-03 PROCEDURE — 82042 OTHER SOURCE ALBUMIN QUAN EA: CPT | Performed by: STUDENT IN AN ORGANIZED HEALTH CARE EDUCATION/TRAINING PROGRAM

## 2024-09-03 PROCEDURE — 45330 DIAGNOSTIC SIGMOIDOSCOPY: CPT | Performed by: INTERNAL MEDICINE

## 2024-09-03 PROCEDURE — 25000003 PHARM REV CODE 250: Performed by: HOSPITALIST

## 2024-09-03 PROCEDURE — 25000003 PHARM REV CODE 250: Performed by: RADIOLOGY

## 2024-09-03 PROCEDURE — 25000003 PHARM REV CODE 250: Performed by: NURSE ANESTHETIST, CERTIFIED REGISTERED

## 2024-09-03 PROCEDURE — P9047 ALBUMIN (HUMAN), 25%, 50ML: HCPCS | Mod: JZ,JG | Performed by: STUDENT IN AN ORGANIZED HEALTH CARE EDUCATION/TRAINING PROGRAM

## 2024-09-03 PROCEDURE — 20000000 HC ICU ROOM

## 2024-09-03 PROCEDURE — 89051 BODY FLUID CELL COUNT: CPT | Performed by: STUDENT IN AN ORGANIZED HEALTH CARE EDUCATION/TRAINING PROGRAM

## 2024-09-03 PROCEDURE — 87075 CULTR BACTERIA EXCEPT BLOOD: CPT | Performed by: STUDENT IN AN ORGANIZED HEALTH CARE EDUCATION/TRAINING PROGRAM

## 2024-09-03 PROCEDURE — 84540 ASSAY OF URINE/UREA-N: CPT | Performed by: STUDENT IN AN ORGANIZED HEALTH CARE EDUCATION/TRAINING PROGRAM

## 2024-09-03 PROCEDURE — 86920 COMPATIBILITY TEST SPIN: CPT | Performed by: HOSPITALIST

## 2024-09-03 PROCEDURE — 80053 COMPREHEN METABOLIC PANEL: CPT | Performed by: STUDENT IN AN ORGANIZED HEALTH CARE EDUCATION/TRAINING PROGRAM

## 2024-09-03 PROCEDURE — 36415 COLL VENOUS BLD VENIPUNCTURE: CPT | Performed by: HOSPITALIST

## 2024-09-03 PROCEDURE — 36415 COLL VENOUS BLD VENIPUNCTURE: CPT | Performed by: INTERNAL MEDICINE

## 2024-09-03 PROCEDURE — 36430 TRANSFUSION BLD/BLD COMPNT: CPT

## 2024-09-03 PROCEDURE — 63600175 PHARM REV CODE 636 W HCPCS: Performed by: NURSE ANESTHETIST, CERTIFIED REGISTERED

## 2024-09-03 PROCEDURE — 86850 RBC ANTIBODY SCREEN: CPT | Performed by: HOSPITALIST

## 2024-09-03 PROCEDURE — 82140 ASSAY OF AMMONIA: CPT | Performed by: STUDENT IN AN ORGANIZED HEALTH CARE EDUCATION/TRAINING PROGRAM

## 2024-09-03 PROCEDURE — 85610 PROTHROMBIN TIME: CPT | Performed by: STUDENT IN AN ORGANIZED HEALTH CARE EDUCATION/TRAINING PROGRAM

## 2024-09-03 PROCEDURE — 85025 COMPLETE CBC W/AUTO DIFF WBC: CPT | Mod: 91 | Performed by: INTERNAL MEDICINE

## 2024-09-03 PROCEDURE — 82570 ASSAY OF URINE CREATININE: CPT | Performed by: STUDENT IN AN ORGANIZED HEALTH CARE EDUCATION/TRAINING PROGRAM

## 2024-09-03 PROCEDURE — 86900 BLOOD TYPING SEROLOGIC ABO: CPT | Performed by: HOSPITALIST

## 2024-09-03 PROCEDURE — 87070 CULTURE OTHR SPECIMN AEROBIC: CPT | Performed by: STUDENT IN AN ORGANIZED HEALTH CARE EDUCATION/TRAINING PROGRAM

## 2024-09-03 PROCEDURE — 25000003 PHARM REV CODE 250: Performed by: STUDENT IN AN ORGANIZED HEALTH CARE EDUCATION/TRAINING PROGRAM

## 2024-09-03 PROCEDURE — 37000008 HC ANESTHESIA 1ST 15 MINUTES: Performed by: INTERNAL MEDICINE

## 2024-09-03 PROCEDURE — 84300 ASSAY OF URINE SODIUM: CPT | Performed by: STUDENT IN AN ORGANIZED HEALTH CARE EDUCATION/TRAINING PROGRAM

## 2024-09-03 PROCEDURE — 0W9G3ZX DRAINAGE OF PERITONEAL CAVITY, PERCUTANEOUS APPROACH, DIAGNOSTIC: ICD-10-PCS | Performed by: INTERNAL MEDICINE

## 2024-09-03 PROCEDURE — 30233N1 TRANSFUSION OF NONAUTOLOGOUS RED BLOOD CELLS INTO PERIPHERAL VEIN, PERCUTANEOUS APPROACH: ICD-10-PCS | Performed by: INTERNAL MEDICINE

## 2024-09-03 PROCEDURE — 63600175 PHARM REV CODE 636 W HCPCS: Performed by: STUDENT IN AN ORGANIZED HEALTH CARE EDUCATION/TRAINING PROGRAM

## 2024-09-03 PROCEDURE — 94761 N-INVAS EAR/PLS OXIMETRY MLT: CPT

## 2024-09-03 PROCEDURE — BW40ZZZ ULTRASONOGRAPHY OF ABDOMEN: ICD-10-PCS | Performed by: INTERNAL MEDICINE

## 2024-09-03 PROCEDURE — P9016 RBC LEUKOCYTES REDUCED: HCPCS | Performed by: HOSPITALIST

## 2024-09-03 RX ORDER — HYDROCODONE BITARTRATE AND ACETAMINOPHEN 500; 5 MG/1; MG/1
TABLET ORAL
Status: DISCONTINUED | OUTPATIENT
Start: 2024-09-03 | End: 2024-09-05

## 2024-09-03 RX ORDER — SODIUM CHLORIDE 9 MG/ML
INJECTION, SOLUTION INTRAVENOUS CONTINUOUS PRN
Status: DISCONTINUED | OUTPATIENT
Start: 2024-09-03 | End: 2024-09-03

## 2024-09-03 RX ORDER — LIDOCAINE HYDROCHLORIDE 10 MG/ML
INJECTION, SOLUTION EPIDURAL; INFILTRATION; INTRACAUDAL; PERINEURAL
Status: COMPLETED | OUTPATIENT
Start: 2024-09-03 | End: 2024-09-03

## 2024-09-03 RX ORDER — PREDNISONE 20 MG/1
40 TABLET ORAL DAILY
COMMUNITY
Start: 2024-08-28

## 2024-09-03 RX ORDER — PROPOFOL 10 MG/ML
VIAL (ML) INTRAVENOUS
Status: DISCONTINUED | OUTPATIENT
Start: 2024-09-03 | End: 2024-09-03

## 2024-09-03 RX ORDER — SODIUM CHLORIDE 9 MG/ML
INJECTION, SOLUTION INTRAVENOUS CONTINUOUS
Status: DISCONTINUED | OUTPATIENT
Start: 2024-09-03 | End: 2024-09-03

## 2024-09-03 RX ORDER — SODIUM BICARBONATE 650 MG/1
650 TABLET ORAL 3 TIMES DAILY
COMMUNITY
Start: 2024-08-22

## 2024-09-03 RX ORDER — ASPIRIN 325 MG/1
100 TABLET, FILM COATED ORAL DAILY
COMMUNITY
Start: 2024-07-29

## 2024-09-03 RX ORDER — FOLIC ACID 1 MG/1
1000 TABLET ORAL DAILY
COMMUNITY

## 2024-09-03 RX ORDER — LEVETIRACETAM 500 MG/1
500 TABLET ORAL 2 TIMES DAILY
COMMUNITY

## 2024-09-03 RX ORDER — PANTOPRAZOLE SODIUM 40 MG/1
40 TABLET, DELAYED RELEASE ORAL 2 TIMES DAILY
COMMUNITY
Start: 2024-07-29 | End: 2024-11-26

## 2024-09-03 RX ADMIN — OCTREOTIDE ACETATE 100 MCG: 100 INJECTION, SOLUTION INTRAVENOUS; SUBCUTANEOUS at 05:09

## 2024-09-03 RX ADMIN — SODIUM CHLORIDE: 0.9 INJECTION, SOLUTION INTRAVENOUS at 03:09

## 2024-09-03 RX ADMIN — MUPIROCIN 1 G: 20 OINTMENT TOPICAL at 10:09

## 2024-09-03 RX ADMIN — THIAMINE HCL TAB 100 MG 100 MG: 100 TAB at 07:09

## 2024-09-03 RX ADMIN — CEFTRIAXONE SODIUM 2 G: 2 INJECTION, POWDER, FOR SOLUTION INTRAMUSCULAR; INTRAVENOUS at 05:09

## 2024-09-03 RX ADMIN — ALBUMIN (HUMAN) 25 G: 0.25 INJECTION, SOLUTION INTRAVENOUS at 08:09

## 2024-09-03 RX ADMIN — PROPOFOL 110 MG: 10 INJECTION, EMULSION INTRAVENOUS at 03:09

## 2024-09-03 RX ADMIN — LEVETIRACETAM INJECTION 500 MG: 5 INJECTION INTRAVENOUS at 09:09

## 2024-09-03 RX ADMIN — PANTOPRAZOLE SODIUM 40 MG: 40 INJECTION, POWDER, FOR SOLUTION INTRAVENOUS at 10:09

## 2024-09-03 RX ADMIN — ALBUMIN (HUMAN) 25 G: 0.25 INJECTION, SOLUTION INTRAVENOUS at 10:09

## 2024-09-03 RX ADMIN — FOLIC ACID 1 MG: 1 TABLET ORAL at 07:09

## 2024-09-03 RX ADMIN — PANTOPRAZOLE SODIUM 40 MG: 40 INJECTION, POWDER, FOR SOLUTION INTRAVENOUS at 08:09

## 2024-09-03 RX ADMIN — LIDOCAINE HYDROCHLORIDE 10 ML: 10 INJECTION, SOLUTION EPIDURAL; INFILTRATION; INTRACAUDAL; PERINEURAL at 10:09

## 2024-09-03 RX ADMIN — CEFTRIAXONE SODIUM 2 G: 2 INJECTION, POWDER, FOR SOLUTION INTRAMUSCULAR; INTRAVENOUS at 04:09

## 2024-09-03 RX ADMIN — SODIUM BICARBONATE 650 MG TABLET 650 MG: at 02:09

## 2024-09-03 RX ADMIN — SODIUM BICARBONATE 650 MG TABLET 650 MG: at 08:09

## 2024-09-03 RX ADMIN — MUPIROCIN 1 G: 20 OINTMENT TOPICAL at 08:09

## 2024-09-03 RX ADMIN — PREDNISONE 40 MG: 20 TABLET ORAL at 07:09

## 2024-09-03 RX ADMIN — MIDODRINE HYDROCHLORIDE 10 MG: 10 TABLET ORAL at 01:09

## 2024-09-03 RX ADMIN — OCTREOTIDE ACETATE 100 MCG: 100 INJECTION, SOLUTION INTRAVENOUS; SUBCUTANEOUS at 01:09

## 2024-09-03 RX ADMIN — MIDODRINE HYDROCHLORIDE 10 MG: 10 TABLET ORAL at 05:09

## 2024-09-03 RX ADMIN — SODIUM BICARBONATE 650 MG TABLET 650 MG: at 10:09

## 2024-09-03 RX ADMIN — MIDODRINE HYDROCHLORIDE 10 MG: 10 TABLET ORAL at 10:09

## 2024-09-03 RX ADMIN — LEVETIRACETAM INJECTION 500 MG: 5 INJECTION INTRAVENOUS at 10:09

## 2024-09-03 NOTE — TRANSFER OF CARE
"Anesthesia Transfer of Care Note    Patient: Washington Glasgow    Procedure(s) Performed: Procedure(s) (LRB):  SIGMOIDOSCOPY, FLEXIBLE (N/A)    Patient location: ICU    Anesthesia Type: general    Transport from OR: Transported from OR on 6-10 L/min O2 by face mask with adequate spontaneous ventilation    Post pain: adequate analgesia    Post assessment: no apparent anesthetic complications    Post vital signs: stable    Level of consciousness: awake and alert    Nausea/Vomiting: no nausea/vomiting    Complications: none    Transfer of care protocol was followed      Last vitals: Visit Vitals  /61 (BP Location: Left arm, Patient Position: Sitting)   Pulse 70   Temp 36.6 °C (97.9 °F) (Oral)   Resp 16   Ht 6' 1" (1.854 m)   Wt 83 kg (182 lb 15.7 oz)   SpO2 99%   BMI 24.14 kg/m²     "

## 2024-09-03 NOTE — H&P
"  [facility] Medicine  History & Physical    Patient Name: Washington Glasgow  MRN: [id number]  Patient Class: IP- Inpatient  Admission Date: 9/2/2024  Attending Physician: Shiela Dias MD   Primary Care Provider: No primary care provider on file.         Patient information was obtained from patient and ER records.     Subjective:     Principal Problem:Alcoholic cirrhosis of liver with ascites    Chief Complaint:   Chief Complaint   Patient presents with    Jaundice    Rectal Bleeding        HPI: Patient is a 44-year-old male with history of alcoholic liver cirrhosis, last alcoholic drink a month ago, lower GI bleed, anemia, seizure disorder, chronic kidney disease presents to the emergency room with complaints of abdominal distention and rectal bleeding.  Patient was recently at [facility] on 08/28 for GI bleed at that time, received blood transfusion.  Underwent a colonoscopy, patient and significant other unsure if anything was found.  They report that the cause of the rectal bleeding is still unknown, reports that "banding" was done.  Patient left AMA.  Significant other reports there were discussions about undergoing tips procedure, this is still pending.  Patient follows with Gastroenterology outpatient.  Patient reports ongoing on and off rectal bleeding, and worsening abdominal distention-primary reason he came here.  Patient denies fever, chills, nausea, vomiting, chest pain, shortness of breath.  Review of labs on 08/29 showed WBC 31, hemoglobin 6.9, creatinine 4.97, bilirubin 21.  On this admission WBC 36.13, hemoglobin 8.1, creatinine 4, bilirubin 22.9.  Patient reports also following with Nephrology outpatient.  [facility].  Gastroenterology and Nephrology consulted.       Past Medical History:   Diagnosis Date    Liver disease     Seizures        History reviewed. No pertinent surgical history.    Review of patient's " allergies indicates:  No Known Allergies    No current facility-administered medications on file prior to encounter.     No current outpatient medications on file prior to encounter.     Family History    None       Tobacco Use    Smoking status: Never    Smokeless tobacco: Never   Substance and Sexual Activity    Alcohol use: Not Currently    Drug use: Never    Sexual activity: Not Currently     Review of Systems   Constitutional:  Positive for appetite change. Negative for chills and fever.   Respiratory:  Negative for chest tightness and shortness of breath.    Cardiovascular:  Negative for chest pain and palpitations.   Gastrointestinal:  Positive for abdominal distention and anal bleeding. Negative for nausea and vomiting.   Genitourinary:  Negative for difficulty urinating.   Musculoskeletal:  Negative for arthralgias.   Skin:  Positive for color change.   Neurological:  Negative for dizziness and light-headedness.   Psychiatric/Behavioral:  Negative for agitation.      Objective:     Vital Signs (Most Recent):  Temp: 97.5 °F (36.4 °C) (09/02/24 2044)  Pulse: 91 (09/02/24 2056)  Resp: 17 (09/02/24 2056)  BP: 121/72 (09/02/24 2044)  SpO2: 99 % (09/02/24 2056) Vital Signs (24h Range):  Temp:  [96.5 °F (35.8 °C)-98.3 °F (36.8 °C)] 97.5 °F (36.4 °C)  Pulse:  [] 91  Resp:  [13-18] 17  SpO2:  [96 %-100 %] 99 %  BP: (108-121)/(71-75) 121/72     Weight: 83 kg (182 lb 15.7 oz)  Body mass index is 24.14 kg/m².     Physical Exam  Vitals reviewed.   Constitutional:       Appearance: He is ill-appearing.   HENT:      Head: Atraumatic.      Mouth/Throat:      Mouth: Mucous membranes are dry.   Cardiovascular:      Rate and Rhythm: Normal rate and regular rhythm.   Pulmonary:      Effort: No respiratory distress.      Breath sounds: No wheezing.   Abdominal:      General: There is distension.      Tenderness: There is no abdominal tenderness.   Musculoskeletal:         General: Swelling present.      Right lower leg:  Edema present.      Left lower leg: Edema present.   Skin:     General: Skin is dry.      Coloration: Skin is jaundiced.   Neurological:      General: No focal deficit present.      Mental Status: He is alert. Mental status is at baseline.                Significant Labs: All pertinent labs within the past 24 hours have been reviewed.  CBC:   Recent Labs   Lab 09/02/24  1535   WBC 36.13*   HGB 8.1*   HCT 24.3*        CMP:   Recent Labs   Lab 09/02/24  1535   *   K 3.7      CO2 18*   *   BUN 61*   CREATININE 4.0*   CALCIUM 8.7   PROT 4.9*   ALBUMIN 2.4*   BILITOT 22.9*   ALKPHOS 177*   *   ALT 68*   ANIONGAP 14       Significant Imaging: I have reviewed all pertinent imaging results/findings within the past 24 hours.  Assessment/Plan:     * Alcoholic cirrhosis of liver with ascites  Patient with known Cirrhosis with Child's class Calculator for Child's score link- https://www.[employer].Databricks/calc/340/child-redman-score-cirrhosis-mortality#creator-insights. Co-morbidities are present and inclusive of ascites and anemia/pancytopenia.  MELD-Na score calculated; MELD 3.0: 38 at 9/2/2024  3:35 PM  MELD-Na: 38 at 9/2/2024  3:35 PM  Calculated from:  Serum Creatinine: 4.0 mg/dL (Using max of 3 mg/dL) at 9/2/2024  3:35 PM  Serum Sodium: 134 mmol/L at 9/2/2024  3:35 PM  Total Bilirubin: 22.9 mg/dL at 9/2/2024  3:35 PM  Serum Albumin: 2.4 g/dL at 9/2/2024  3:35 PM  INR(ratio): 1.8 at 9/2/2024  3:35 PM  Age at listing (hypothetical): 44 years  Sex: Male at 9/2/2024  3:35 PM      Continue chronic meds. Etiology likely ETOH. Will avoid any hepatotoxic meds, and monitor CBC/CMP/INR for synthetic function.     Patient ideally needs follow up with hepatology outpatient   Gastroenterology consulted   Started the patient on midodrine, octreotide, albumin   Follow up repeat CMP  Continue lactulose    started on prednisone outpatient, we will continue for alcoholic hepatitis. Could be cause of elevated WBC?  IR  consulted for paracentesis, follow up cultures  Started on rocephin     Rectal bleeding  Reportedly had colonoscopy at [facility], need operative notes   There was banding done, patient unable to elaborate  Gastroenterology consulted   CT scan did show diverticulosis    Metabolic acidosis  Continue sodium bicarb      Seizure disorder  Continue Keppra      CKD (chronic kidney disease)  Creatine stable for now. BMP reviewed- noted Estimated Creatinine Clearance: 26.6 mL/min (A) (based on SCr of 4 mg/dL (H)). according to latest data. Based on current GFR, CKD stage is stage 4 - GFR 15-29.  Monitor UOP and serial BMP and adjust therapy as needed. Renally dose meds. Avoid nephrotoxic medications and procedures.    Creatinine seems to be improving since 08/29 which was 4.97  Nephrology consulted   Continue albumin, octreotide, midodrine for possible HRS    Anemia  Anemia is likely due to chronic blood loss. Most recent hemoglobin and hematocrit are listed below.  Recent Labs     09/02/24  1535   HGB 8.1*   HCT 24.3*     Plan  - Monitor serial CBC: Daily  - Transfuse PRBC if patient becomes hemodynamically unstable, symptomatic or H/H drops below 7/21.  - Patient has not received any PRBC transfusions to date  - Patient's anemia is currently stable  - follow up repeat CBC, iron panel, B12, folic acid   Continue folic acid supplement      VTE Risk Mitigation (From admission, onward)           Ordered     Place sequential compression device  Until discontinued         09/02/24 2045     IP VTE LOW RISK PATIENT  Once         09/02/24 2045                             Nathaniel Bradshaw MD  Department of Hospital Medicine  [facility]

## 2024-09-03 NOTE — ASSESSMENT & PLAN NOTE
Anemia is likely due to chronic blood loss. Most recent hemoglobin and hematocrit are listed below.  Recent Labs     09/02/24  1535   HGB 8.1*   HCT 24.3*     Plan  - Monitor serial CBC: Daily  - Transfuse PRBC if patient becomes hemodynamically unstable, symptomatic or H/H drops below 7/21.  - Patient has not received any PRBC transfusions to date  - Patient's anemia is currently stable  - follow up repeat CBC, iron panel, B12, folic acid   Continue folic acid supplement

## 2024-09-03 NOTE — OP NOTE
COLONOSCOPY PROCEDURE NOTE  Patient Name: Washington Glasgow  Patient MRN: [id number]  Patient : 1980    Service date: 9/3/2024    Reason for Procedure: rectal bleeding    PCP: Monika, Primary Doctor    MEDICATIONS:  -Anesthesia care, please see anesthesiology documentation    PATIENT MONITORING:  -Continuous telemetry, pulse oximetry, and blood pressure were performed    INSTRUMENT:  -Olympus adult endoscope    PROCEDURE NOTE:  The procedure and its accompanying risks were explained to the patient and informed consent was obtained. Cardiopulmonary assessment was adequate. Time out was performed using the patients name, birth date, medical record number, and procedure. The patient was placed in the left lateral decubitus position. After sedation was achieved, a digital rectal exam was performed and unremarkable. The colonoscope was inserted into the rectum and advanced with mild difficulty to rectosigmoid. The quality of the prep was poor    The scope was withdrawn and the procedure complete. The patient tolerated the procedure well with no immediate complications.    CONDITION: Stable  COMPLICATIONS: None  ESTIMATED BLOOD LOSS: None    FINDINGS:  -Limited views of rectosigmoid/rectum normal due to amount of stool  -1cm ulceration above dentate line w/ erythema and slight ooze with manipulation. No interventions performed.     RECOMMENDATIONS:  -Return to ICU  -Conservative from GI perspective as anemia is likely chronic in nature further exacerbated by recent GIB and liver/renal failure.         Jamie Reich III  9/3/2024  3:46 PM

## 2024-09-03 NOTE — ASSESSMENT & PLAN NOTE
Tele monitoring, neuro checks.  Maintain patient on lactulose to ensure 3-4 bowel movements daily.  Check serum ammonia level in the morning.  Observe fall precautions.  Follow GI specialist recommendations.  Patient may require transfer to [facility] hepatology Department for further management.  Alcohol abstinence counseling where be performed later once patient is appropriate.

## 2024-09-03 NOTE — ANESTHESIA PREPROCEDURE EVALUATION
09/03/2024  Washington Glasgow is a 44 y.o., male.    Patient Active Problem List   Diagnosis    Alcoholic cirrhosis of liver with ascites    Anemia    CKD (chronic kidney disease)    Seizure disorder    Metabolic acidosis    Rectal bleeding    Hepatic encephalopathy       History reviewed. No pertinent surgical history.     Tobacco Use:  The patient  reports that he has never smoked. He has never used smokeless tobacco.     No results found for this or any previous visit.     Imaging Results              US Abdomen Limited (Final result)  Result time 09/02/24 16:47:38   Procedure changed from US Abdomen Complete     Final result by Reva Méndez MD (09/02/24 16:47:38)                   Impression:      As above      Electronically signed by: Reva Méndez MD  Date:    09/02/2024  Time:    16:47               Narrative:    EXAMINATION:  US ABDOMEN LIMITED    CLINICAL HISTORY:  ascites; Other ascites    TECHNIQUE:  Limited ultrasound of the abdomen for ascites    COMPARISON:  None.    FINDINGS:  There is ascites seen in all 4 quadrants with moderately large amount ascites seen.  .                                       CT Abdomen Pelvis  Without Contrast (Final result)  Result time 09/02/24 16:44:36      Final result by Reva Méndez MD (09/02/24 16:44:36)                   Impression:      Features of cirrhosis with ascites and nodular contour of the liver.  Mild diffuse bladder wall thickening versus incomplete distention recommend correlation clinically if clinical consideration for cystitis or chronic bladder outlet obstruction.  Diverticulosis without CT findings of acute diverticulitis.  Findings detailed above.      Electronically signed by: Reva Méndez MD  Date:    09/02/2024  Time:    16:44               Narrative:    EXAMINATION:  CT ABDOMEN PELVIS WITHOUT CONTRAST    CLINICAL  HISTORY:  abdominal distention;    TECHNIQUE:  Low dose axial images, sagittal and coronal reformations were obtained from the lung bases to the pubic symphysis.  No p.o. contrast    COMPARISON:  None    FINDINGS:  Mild dependent hypoventilatory changes in visualized lung bases    Large volume of ascites.  Nodular contour the liver suggesting cirrhosis.  Suggestion of recanalization of the umbilical vein.  Spleen not enlarged. Splenule noted. Adrenal glands; slight diffuse thickening.    Pancreas unremarkable    Abdominal aorta no aneurysm    No calcified stones the gallbladder or CT findings of acute cholecystitis or biliary duct dilatation    Diverticulosis without CT findings of acute diverticulitis. Appendix; normal appearing appendix is thought to be visualized.    Kidney; no hydronephrosis or opaque renal stone.  No ureteral dilatation, opaque ureteral stone or ureteral obstruction evident.  The urinary bladder is mildly distended at time of the exam. Wall appears mildly diffusely thickened recommend correlation clinically if clinical consideration for cystitis or chronic bladder outlet obstruction.  The prostate gland does not appear enlarged. There is small left inguinal hernia containing ascites and fat.    Subcutaneous edema.                                       Lab Results   Component Value Date    WBC 31.56 (H) 09/03/2024    HGB 6.2 (LL) 09/03/2024    HCT 19.0 (LL) 09/03/2024    MCV 98 09/03/2024     (L) 09/03/2024     BMP  Lab Results   Component Value Date     (L) 09/03/2024    K 4.1 09/03/2024     09/03/2024    CO2 19 (L) 09/03/2024    BUN 64 (H) 09/03/2024    CREATININE 3.9 (H) 09/03/2024    CALCIUM 7.8 (L) 09/03/2024    ANIONGAP 11 09/03/2024     (H) 09/03/2024     (H) 09/02/2024       No results found for this or any previous visit.           Pre-op Assessment    I have reviewed the Patient Summary Reports.     I have reviewed the Nursing Notes. I have reviewed the  NPO Status.   I have reviewed the Medications.     Review of Systems  Anesthesia Hx:  No problems with previous Anesthesia   Neg history of prior surgery.          Denies Family Hx of Anesthesia complications.    Denies Personal Hx of Anesthesia complications.                    Social:  Non-Smoker, No Alcohol Use Hx of ETOH abuse      Hematology/Oncology:    Oncology Normal    -- Anemia:               Hematology Comments: Hx of transfusions                    EENT/Dental:  EENT/Dental Normal           Cardiovascular:  Cardiovascular Normal                                            Pulmonary:  Pulmonary Normal                       Renal/:  Chronic Renal Disease, CKD                Hepatic/GI:      Liver Disease, (Cirrhosis)  Hx of GI bleed          Musculoskeletal:  Musculoskeletal Normal                Neurological:       Seizures                                Endocrine:  Endocrine Normal            Dermatological:  Skin Normal    Psych:  Psychiatric Normal                    Physical Exam  General: Well nourished and Alert    Airway:  Mallampati: II   Mouth Opening: Normal  TM Distance: Normal  Tongue: Normal  Neck ROM: Normal ROM    Dental:  Intact    Chest/Lungs:  Clear to auscultation, Normal Respiratory Rate    Heart:  Rate: Normal  Rhythm: Regular Rhythm  Sounds: Normal        Anesthesia Plan  Type of Anesthesia, risks & benefits discussed:    Anesthesia Type: Gen Natural Airway  Intra-op Monitoring Plan: Standard ASA Monitors  Induction:  IV  Informed Consent: Informed consent signed with the Patient and all parties understand the risks and agree with anesthesia plan.  All questions answered. Patient consented to blood products? Yes  ASA Score: 3 Emergent    Ready For Surgery From Anesthesia Perspective.     .

## 2024-09-03 NOTE — SUBJECTIVE & OBJECTIVE
Past Medical History:   Diagnosis Date    Liver disease     Seizures        History reviewed. No pertinent surgical history.    Review of patient's allergies indicates:  No Known Allergies    No current facility-administered medications on file prior to encounter.     No current outpatient medications on file prior to encounter.     Family History    None       Tobacco Use    Smoking status: Never    Smokeless tobacco: Never   Substance and Sexual Activity    Alcohol use: Not Currently    Drug use: Never    Sexual activity: Not Currently     Review of Systems   Constitutional:  Positive for appetite change. Negative for chills and fever.   Respiratory:  Negative for chest tightness and shortness of breath.    Cardiovascular:  Negative for chest pain and palpitations.   Gastrointestinal:  Positive for abdominal distention and anal bleeding. Negative for nausea and vomiting.   Genitourinary:  Negative for difficulty urinating.   Musculoskeletal:  Negative for arthralgias.   Skin:  Positive for color change.   Neurological:  Negative for dizziness and light-headedness.   Psychiatric/Behavioral:  Negative for agitation.      Objective:     Vital Signs (Most Recent):  Temp: 97.5 °F (36.4 °C) (09/02/24 2044)  Pulse: 91 (09/02/24 2056)  Resp: 17 (09/02/24 2056)  BP: 121/72 (09/02/24 2044)  SpO2: 99 % (09/02/24 2056) Vital Signs (24h Range):  Temp:  [96.5 °F (35.8 °C)-98.3 °F (36.8 °C)] 97.5 °F (36.4 °C)  Pulse:  [] 91  Resp:  [13-18] 17  SpO2:  [96 %-100 %] 99 %  BP: (108-121)/(71-75) 121/72     Weight: 83 kg (182 lb 15.7 oz)  Body mass index is 24.14 kg/m².     Physical Exam  Vitals reviewed.   Constitutional:       Appearance: He is ill-appearing.   HENT:      Head: Atraumatic.      Mouth/Throat:      Mouth: Mucous membranes are dry.   Cardiovascular:      Rate and Rhythm: Normal rate and regular rhythm.   Pulmonary:      Effort: No respiratory distress.      Breath sounds: No wheezing.   Abdominal:      General:  There is distension.      Tenderness: There is no abdominal tenderness.   Musculoskeletal:         General: Swelling present.      Right lower leg: Edema present.      Left lower leg: Edema present.   Skin:     General: Skin is dry.      Coloration: Skin is jaundiced.   Neurological:      General: No focal deficit present.      Mental Status: He is alert. Mental status is at baseline.                Significant Labs: All pertinent labs within the past 24 hours have been reviewed.  CBC:   Recent Labs   Lab 09/02/24  1535   WBC 36.13*   HGB 8.1*   HCT 24.3*        CMP:   Recent Labs   Lab 09/02/24  1535   *   K 3.7      CO2 18*   *   BUN 61*   CREATININE 4.0*   CALCIUM 8.7   PROT 4.9*   ALBUMIN 2.4*   BILITOT 22.9*   ALKPHOS 177*   *   ALT 68*   ANIONGAP 14       Significant Imaging: I have reviewed all pertinent imaging results/findings within the past 24 hours.

## 2024-09-03 NOTE — PLAN OF CARE
Paracentesis completed.  Pt dorothy well.  5 liters of clear yellow fluid drained.  Gauze and paper tape dressing to right mid abd.  Pt assisted to w/c.  ICU RN at pt's side.  Pt transported to back to room 2202.

## 2024-09-03 NOTE — PLAN OF CARE
[facility]  Initial Discharge Assessment    CM met with pt who had mother present. Verified/corrected demographics.  Pt PCP is with [facility], Sahil Maxwell, at [facility] and he is not in system.  Pt lives with his mother who is assisting in care and providing transportation.  No HH, no dialysis, no POA/AD. DME is a cane and he reports that he needs a rolling walker due to weakness.  Mother ensures pt takes meds as ordered.  CM will continue to follow.    Primary Care Provider: No, Primary Doctor    Admission Diagnosis: Alcoholic cirrhosis of liver with ascites [K70.31]    Admission Date: 9/2/2024  Expected Discharge Date: 9/9/2024    Transition of Care Barriers: Substance Abuse (Limited coverage due to marketplace insurance)    Payor: [employer] / Plan: [employer] [place] / Product Type: Commercial /     Extended Emergency Contact Information  Primary Emergency Contact: steven hillman  Address: [address] [place] [address]  Home Phone: [phone]  Mobile Phone: [phone]  Relation: Sister  Preferred language: English   needed? No  Secondary Emergency Contact: lala hillman  Address: [address] [place] [address]  Mobile Phone: [phone]  Relation: Mother  Preferred language: English   needed? No    Discharge Plan A: Home with family  Discharge Plan B: Home      [facility], [place] - [address] [address]  Phone: [phone] Fax: [phone]      Initial Assessment (most recent)       Adult Discharge Assessment - 09/03/24 1110          Discharge Assessment    Assessment Type Discharge Planning Assessment     Confirmed/corrected address, phone number and insurance Yes   Corrected errors    Confirmed Demographics Correct on Facesheet     Source of Information patient;family     If unable to  respond/provide information was family/caregiver contacted? --   Mother present    When was your last doctors appointment? --   unsure    Communicated ED with patient/caregiver Date not available/Unable to determine     Reason For Admission Alcoholic cirrhosis of liver     People in Home parent(s)     Facility Arrived From: Home     Do you expect to return to your current living situation? Yes     Do you have help at home or someone to help you manage your care at home? Yes     Who are your caregiver(s) and their phone number(s)? Stephenie Rea [phone]     Prior to hospitilization cognitive status: Unable to Assess     Current cognitive status: Alert/Oriented     Walking or Climbing Stairs Difficulty yes     Walking or Climbing Stairs ambulation difficulty, requires equipment     Mobility Management Weak, states could use walker     Dressing/Bathing Difficulty no     Home Layout Other (see comments)   [facility] with 4 steps going into home    Equipment Currently Used at Home cane, straight     Readmission within 30 days? No     Patient currently being followed by outpatient case management? No     Do you currently have service(s) that help you manage your care at home? No     Do you take prescription medications? Yes     Do you have prescription coverage? Yes     Coverage Duane Winter     Do you have any problems affording any of your prescribed medications? No     Is the patient taking medications as prescribed? yes     Who is going to help you get home at discharge? Stephenie Rea [phone]     How do you get to doctors appointments? family or friend will provide     Are you on dialysis? No     Do you take coumadin? No     Discharge Plan A Home with family     Discharge Plan B Home     DME Needed Upon Discharge  walker, rolling     Discharge Plan discussed with: Parent(s);Patient     Name(s) and Number(s) Stephenie Rea [phone]     Transition of Care Barriers Substance Abuse    Limited coverage due to marketplace insurance       Physical Activity    On average, how many days per week do you engage in moderate to strenuous exercise (like a brisk walk)? 0 days     On average, how many minutes do you engage in exercise at this level? 0 min        Financial Resource Strain    How hard is it for you to pay for the very basics like food, housing, medical care, and heating? Not hard at all        Housing Stability    In the last 12 months, was there a time when you were not able to pay the mortgage or rent on time? No     At any time in the past 12 months, were you homeless or living in a shelter (including now)? No        Transportation Needs    Has the lack of transportation kept you from medical appointments, meetings, work or from getting things needed for daily living? No        Food Insecurity    Within the past 12 months, you worried that your food would run out before you got the money to buy more. Never true     Within the past 12 months, the food you bought just didn't last and you didn't have money to get more. Never true        Alcohol Use    Q1: How often do you have a drink containing alcohol? --   States no drinking in a month       Utilities    In the past 12 months has the electric, gas, oil, or water company threatened to shut off services in your home? No        Health Literacy    How often do you need to have someone help you when you read instructions, pamphlets, or other written material from your doctor or pharmacy? Sometimes   Mother assists because pt is at times forgetful/confused       OTHER    Name(s) of People in Home Mother, Stephenie Glasgow [phone]

## 2024-09-03 NOTE — ASSESSMENT & PLAN NOTE
Patient with known Cirrhosis with Child's class Calculator for Child's score link- https://www.[employer].com/calc/340/child-redman-score-cirrhosis-mortality#creator-insights. Co-morbidities are present and inclusive of ascites and anemia/pancytopenia.  MELD-Na score calculated; MELD 3.0: 38 at 9/2/2024  3:35 PM  MELD-Na: 38 at 9/2/2024  3:35 PM  Calculated from:  Serum Creatinine: 4.0 mg/dL (Using max of 3 mg/dL) at 9/2/2024  3:35 PM  Serum Sodium: 134 mmol/L at 9/2/2024  3:35 PM  Total Bilirubin: 22.9 mg/dL at 9/2/2024  3:35 PM  Serum Albumin: 2.4 g/dL at 9/2/2024  3:35 PM  INR(ratio): 1.8 at 9/2/2024  3:35 PM  Age at listing (hypothetical): 44 years  Sex: Male at 9/2/2024  3:35 PM      Continue chronic meds. Etiology likely ETOH. Will avoid any hepatotoxic meds, and monitor CBC/CMP/INR for synthetic function.     Patient ideally needs follow up with hepatology outpatient   Gastroenterology consulted   Started the patient on midodrine, octreotide, albumin   Follow up repeat CMP  Continue lactulose    started on prednisone outpatient, we will continue for alcoholic hepatitis. Could be cause of elevated WBC?  IR consulted for paracentesis, follow up cultures  Started on rocephin

## 2024-09-03 NOTE — HOSPITAL COURSE
Patient admitted to intensive care unit.  Hemoglobin and hematocrit closely monitored.  Patient received 2 units of packed red blood cells.  Gastroenterology and nephrology team consulted.  Patient maintained on midodrine, octreotide and albumin therapy.  Patient underwent abdominal paracentesis with 5 L ascitic straw-colored fluid removal.  Patient maintained on intravenous ceftriaxone prophylaxis.  Microbiology closely followed.  Patient maintained on lactulose.  Serum ammonia trended.  Patient maintained on prednisone therapy for alcoholic hepatitis management.  Patient was evaluated by GI specialist who performed flexible sigmoidoscopy revealing a linear ulceration at dentate line.  Patient has been noncompliant with use of lactulose and has refused Macias catheter placement during hospital stay.  Multiple conversations regarding compliance with lactulose were conducted with patient and his mother. Palliative care was also consulted.  GI recommended octreotide until 09/09/2024. S/P 4 L of paracenthesis removed on 9/9. Spironolactone will be started on 9/10 to see if patient is able to tolerate it. Continue midodrine. Nephrology follow patient for REMI on CKD during hospital stay. Patient also has acute on chronic leukocytosis and could be multifactorial including being on prednisone.  Recommended outpatient follow-up with Hematology Oncology.

## 2024-09-03 NOTE — RESPIRATORY THERAPY
09/03/24 0805   Patient Assessment/Suction   Level of Consciousness (AVPU) alert   Respiratory Effort Normal;Unlabored   Expansion/Accessory Muscles/Retractions no use of accessory muscles;no retractions;expansion symmetric   Rhythm/Pattern, Respiratory unlabored;pattern regular;depth regular;no shortness of breath reported   PRE-TX-O2   Device (Oxygen Therapy) room air   SpO2 (!) 93 %   Pulse Oximetry Type Continuous   $ Pulse Oximetry - Multiple Charge Pulse Oximetry - Multiple   Pulse 95   Resp (!) 28   /89

## 2024-09-03 NOTE — PHARMACY MED REC
"Admission Medication History     The home medication history was taken by Julián Viveros.    You may go to "Admission" then "Reconcile Home Medications" tabs to review and/or act upon these items.     The home medication list has been updated by the Pharmacy department.   Please read ALL comments highlighted in yellow.   Please address this information as you see fit.    Feel free to contact us if you have any questions or require assistance.        Medications listed below were obtained from: Patient/family and Analytic software- [employer]  No current facility-administered medications on file prior to encounter.     Current Outpatient Medications on File Prior to Encounter   Medication Sig Dispense Refill    folic acid (FOLVITE) 1 MG tablet Take 1,000 mcg by mouth once daily.      levETIRAcetam (KEPPRA) 500 MG Tab Take 500 mg by mouth 2 (two) times daily.      predniSONE (DELTASONE) 20 MG tablet Take 40 mg by mouth once daily.      PROTONIX 40 mg tablet Take 40 mg by mouth 2 (two) times daily.      sodium bicarbonate 650 MG tablet Take 650 mg by mouth 3 (three) times daily.      thiamine mononitrate, vit B1, (VITAMIN B-1, MONONITRATE,) 100 mg Tab Take 100 mg by mouth once daily.           Julián Viveros  EXT [id number]                .          "

## 2024-09-03 NOTE — ASSESSMENT & PLAN NOTE
Creatine stable for now. BMP reviewed- noted Estimated Creatinine Clearance: 26.6 mL/min (A) (based on SCr of 4 mg/dL (H)). according to latest data. Based on current GFR, CKD stage is stage 4 - GFR 15-29.  Monitor UOP and serial BMP and adjust therapy as needed. Renally dose meds. Avoid nephrotoxic medications and procedures.    Creatinine seems to be improving since 08/29 which was 4.97  Nephrology consulted   Continue albumin, octreotide, midodrine for possible HRS

## 2024-09-03 NOTE — SUBJECTIVE & OBJECTIVE
Interval History:  Patient just returned after having abdominal paracentesis performed by Interventional Radiology went 5 L of clear straw-colored ascitic fluid removed.  Results pending.  Patient confused and mildly tremulous.  Patient's mother is present at bedside.  Patient receiving 1st unit of packed red blood cells.  Currently afebrile.  Dr. Holloway from Nephrology here to see the patient with hepatorenal syndrome.    Review of Systems   Constitutional:  Positive for appetite change. Negative for chills and fever.   Respiratory:  Negative for chest tightness and shortness of breath.    Cardiovascular:  Negative for chest pain and palpitations.   Gastrointestinal:  Positive for abdominal distention and anal bleeding. Negative for nausea and vomiting.   Genitourinary:  Negative for difficulty urinating.   Musculoskeletal:  Negative for arthralgias.   Skin:  Positive for color change.   Neurological:  Negative for dizziness and light-headedness.   Psychiatric/Behavioral:  Negative for agitation.      Objective:     Vital Signs (Most Recent):  Temp: 98.4 °F (36.9 °C) (09/03/24 0703)  Pulse: 71 (09/03/24 0703)  Resp: (!) 22 (09/03/24 0703)  BP: 125/77 (09/03/24 0703)  SpO2: 100 % (09/03/24 0703) Vital Signs (24h Range):  Temp:  [96.5 °F (35.8 °C)-98.4 °F (36.9 °C)] 98.4 °F (36.9 °C)  Pulse:  [] 71  Resp:  [12-22] 22  SpO2:  [96 %-100 %] 100 %  BP: (108-149)/(59-80) 125/77     Weight: 83 kg (182 lb 15.7 oz)  Body mass index is 24.14 kg/m².    Intake/Output Summary (Last 24 hours) at 9/3/2024 0807  Last data filed at 9/2/2024 2308  Gross per 24 hour   Intake 440 ml   Output --   Net 440 ml         Physical Exam  Vitals reviewed.   Constitutional:       Appearance: He is ill-appearing.   HENT:      Head: Atraumatic.      Mouth/Throat:      Mouth: Mucous membranes are dry.   Cardiovascular:      Rate and Rhythm: Normal rate and regular rhythm.   Pulmonary:      Effort: No respiratory distress.      Breath sounds:  "No wheezing.   Abdominal:      General: There is distension.      Tenderness: There is no abdominal tenderness.   Musculoskeletal:         General: Swelling present.      Right lower leg: Edema present.      Left lower leg: Edema present.   Skin:     General: Skin is dry.      Coloration: Skin is jaundiced.   Neurological:      General: No focal deficit present.      Mental Status: He is alert. Mental status is at baseline.             Significant Labs: All pertinent labs within the past 24 hours have been reviewed.  CBC:   Recent Labs   Lab 09/02/24  1535 09/03/24  0301   WBC 36.13* 31.56*   HGB 8.1* 6.2*   HCT 24.3* 19.0*    124*     CMP:   Recent Labs   Lab 09/02/24  1535 09/03/24  0301   * 133*   K 3.7 4.1    103   CO2 18* 19*   * 158*   BUN 61* 64*   CREATININE 4.0* 3.9*   CALCIUM 8.7 7.8*   PROT 4.9* 4.0*   ALBUMIN 2.4* 2.7*   BILITOT 22.9* 20.3*   ALKPHOS 177* 120   * 74*   ALT 68* 50*   ANIONGAP 14 11     Coagulation:   Recent Labs   Lab 09/02/24  1535 09/03/24  0301   INR 1.8* 1.9*   APTT 29.3  --      Troponin:   Recent Labs   Lab 09/02/24  1535 09/02/24 2029 09/02/24  2358   TROPONINI 0.030*  --   --    TROPONINIHS  --  33.3* 31.8*     TSH: No results for input(s): "TSH" in the last 4320 hours.  Urine Studies:   Recent Labs   Lab 09/02/24  1817   COLORU Yellow   APPEARANCEUA Clear   PHUR 6.0   SPECGRAV 1.015   PROTEINUA Trace*   GLUCUA Negative   KETONESU Negative   BILIRUBINUA 2+*   OCCULTUA Negative   NITRITE Negative   UROBILINOGEN Negative   LEUKOCYTESUR Negative     Ammonia: 63    Significant Imaging:   CT abdomen and Pelvis without contrast:  Features of cirrhosis with ascites and nodular contour of the liver. Mild diffuse bladder wall thickening versus incomplete distention recommend correlation clinically if clinical consideration for cystitis or chronic bladder outlet obstruction. Diverticulosis without CT findings of acute diverticulitis. Findings detailed " above.     US abdomen Limited:  There is ascites seen in all 4 quadrants with moderately large amount ascites seen.     IR Paracentesis:  5 L of clear straw-colored ascitic fluid removed

## 2024-09-03 NOTE — ASSESSMENT & PLAN NOTE
Reportedly had colonoscopy at [facility], need operative notes   There was banding done, patient unable to elaborate  Gastroenterology consulted   CT scan did show diverticulosis

## 2024-09-03 NOTE — CONSULTS
GASTROENTEROLOGY INPATIENT CONSULT NOTE  Patient Name: Washington Glasgow  Patient MRN: [id number]  Patient : 1980    Admit Date: 2024  Service date: 9/3/2024    Reason for Consult: acute / chronic anemia    PCP: No primary care provider on file.    Chief Complaint   Patient presents with    Jaundice    Rectal Bleeding       HPI: Patient is a 44 y.o. male with PMHx decompensated EtOH cirrhosis, tranfusion dependent anemia, thrombocytopenia, diverticulosis, seizures, hemorrhoids vs rectal varices s/p EVL presents for evaluation of numerous medical issues now w/ progressive anemia in setting of renal/liver failure. Was having large amounts of bleeding in early  @ OSH resolved w/ bandings of hemorrhoids vs rectal varices. Trace blood every other BM w/ small amount of bright red blood over past few weeks but not bleeding like he was in early . No melena, maroon stools or abd pain. Was drinking a pint of liquor daily prior to admission in early . Renal now involved as well due to renal failure but Cr appears better than was on  (4.97). Patient seen and followed in [facility] for his liver issues.     CHART REVIEW:   Labs on admission - Tb 20.3; CR 3.9; INR 1.9  Labs late  TB 21; Cr 4.9  Para  - s/p 5L removed; Neg for SBP; Low albumin <1.5  CT ABd  - cirrhosis/ascites; tics; otherwise nml bowels / GB / panc  Flex 24 @OSH- no further bleeding  EGD 24 @ OSH- normal esophagus down to 30 cm where grade 1 varices were noted down to the squamocolumnar junction at 40 cm; there was no blood and no stigmata of bleeding seen. Cracked mosaic pattern gastric mucosa suggestive of underlying portal hypertensive gastropathy but no blood or stigmata bleeding seen to the distal duodenum   Colon 24 @ OSH- rectal varices over the hemorrhoid plexus, 1 with oozing red blood treated with placement of single band 1-2 cm proximally to the base, other nonbleeding rectal varices, and diverticulosis of  the left colon   MRI liver 7/'24 @OSH- cirrhosis; No liver masses    Past Medical History:  Past Medical History:   Diagnosis Date    Liver disease     Seizures         Past Surgical History:  History reviewed. No pertinent surgical history.     Home Medications:  No medications prior to admission.       Inpatient Medications:   albumin human 25%  25 g Intravenous BID    cefTRIAXone (Rocephin) IV (PEDS and ADULTS)  2 g Intravenous Q12H    folic acid  1 mg Oral Daily    lactulose  20 g Oral TID    levETIRAcetam (Keppra) IV (PEDS and ADULTS)  500 mg Intravenous Q12H    midodrine  10 mg Oral Q8H    mupirocin   Nasal BID    octreotide  100 mcg Subcutaneous Q8H    pantoprazole  40 mg Intravenous BID    predniSONE  40 mg Oral Daily    sodium bicarbonate  650 mg Oral TID    thiamine  100 mg Oral Daily       Current Facility-Administered Medications:     0.9%  NaCl infusion (for blood administration), , Intravenous, Q24H PRN    magnesium oxide, 800 mg, Oral, PRN    magnesium oxide, 800 mg, Oral, PRN    ondansetron, 4 mg, Intravenous, Q6H PRN    oxyCODONE, 5 mg, Oral, Q4H PRN    potassium bicarbonate, 35 mEq, Oral, PRN    potassium bicarbonate, 50 mEq, Oral, PRN    potassium bicarbonate, 60 mEq, Oral, PRN    potassium, sodium phosphates, 2 packet, Oral, PRN    potassium, sodium phosphates, 2 packet, Oral, PRN    potassium, sodium phosphates, 2 packet, Oral, PRN    Review of patient's allergies indicates:  No Known Allergies    Social History:   Social History     Occupational History    Not on file   Tobacco Use    Smoking status: Never    Smokeless tobacco: Never   Substance and Sexual Activity    Alcohol use: Not Currently    Drug use: Never    Sexual activity: Not Currently       Family History:   No family history on file.    Review of Systems:  A 10 point review of systems was performed and was normal, except as mentioned in the HPI, including constitutional, HEENT, heme, lymph, cardiovascular, respiratory,  "gastrointestinal, genitourinary, neurologic, endocrine, psychiatric and musculoskeletal.      OBJECTIVE:    Physical Exam:  24 Hour Vital Sign Ranges: Temp:  [96.5 °F (35.8 °C)-98.3 °F (36.8 °C)] 97.4 °F (36.3 °C)  Pulse:  [] 69  Resp:  [12-18] 18  SpO2:  [96 %-100 %] 100 %  BP: (108-149)/(59-80) 111/80  Most recent vitals: /80   Pulse 69   Temp 97.4 °F (36.3 °C) (Axillary)   Resp 18   Ht 6' 1" (1.854 m)   Wt 83 kg (182 lb 15.7 oz)   SpO2 100%   BMI 24.14 kg/m²    GEN: well-developed, well-nourished, awake and alert, non-toxic appearing adult  HEENT: PERRL, sclera anicteric, oral mucosa pink and moist without lesion  NECK: trachea midline; Good ROM  CV: regular rate and rhythm, no murmurs or gallops  RESP: clear to auscultation bilaterally, no wheezes, rhonci or rales  ABD: soft, non-tender, non-distended, normal bowel sounds  EXT: no swelling or edema, 2+ pulses distally  SKIN: no rashes or jaundice  PSYCH: normal affect    Labs:   Recent Labs     09/02/24  1535 09/03/24  0301   WBC 36.13* 31.56*   MCV 96 98    124*     Recent Labs     09/02/24  1535 09/03/24  0301   * 133*   K 3.7 4.1    103   CO2 18* 19*   BUN 61* 64*   * 158*     No results for input(s): "ALB" in the last 72 hours.    Invalid input(s): "ALKP", "SGOT", "SGPT", "TBIL", "DBIL", "TPRO"  Recent Labs     09/02/24  1535 09/03/24  0301   INR 1.8* 1.9*         Radiology Review:  US Abdomen Limited   Final Result      As above         Electronically signed by: Reva Méndez MD   Date:    09/02/2024   Time:    16:47      CT Abdomen Pelvis  Without Contrast   Final Result      Features of cirrhosis with ascites and nodular contour of the liver.  Mild diffuse bladder wall thickening versus incomplete distention recommend correlation clinically if clinical consideration for cystitis or chronic bladder outlet obstruction.  Diverticulosis without CT findings of acute diverticulitis.  Findings detailed above.       "   Electronically signed by: Reva Méndez MD   Date:    09/02/2024   Time:    16:44      IR Paracentesis with Imaging    (Results Pending)         IMPRESSION / RECOMMENDATIONS:  44 y.o. male with PMHx decompensated EtOH cirrhosis, tranfusion dependent anemia, thrombocytopenia, diverticulosis, seizures, hemorrhoids vs rectal varices s/p EVL presents for evaluation of numerous medical issues now w/ progressive anemia w/ trace bleeding in setting of subacute renal/liver failure. RIsks, benefits, alternatives discussed in detail regarding upcoming procedures and sedation and possible complications. Some of the more common endoscopic complications include but not limited to immediate or delayed perforation, bleeding, infections, pain, inadvertent injury to surrounding tissue / organs and possible need for surgical evaluation. All questions answered and will proceed with procedure as planned.     -Flex sig today to evaluate previous bleeding site  -EtOH cessation indefinitely    Thank you for this consult.    Jamie Reich III  9/3/2024  7:06 AM

## 2024-09-03 NOTE — NURSING
Per nehpro recommends patient have fernandez placed. Attempted to explain to pt the needs for fernandez. Verbally refused, notified providers will try again.

## 2024-09-03 NOTE — ANESTHESIA POSTPROCEDURE EVALUATION
Anesthesia Post Evaluation    Patient: Washington Glasgow    Procedure(s) Performed: Procedure(s) (LRB):  SIGMOIDOSCOPY, FLEXIBLE (N/A)    Final Anesthesia Type: general      Patient location during evaluation: ICU  Patient participation: Yes- Able to Participate  Level of consciousness: awake  Post-procedure vital signs: reviewed and stable  Pain management: adequate  Airway patency: patent    PONV status at discharge: No PONV  Anesthetic complications: no      Cardiovascular status: blood pressure returned to baseline, hemodynamically stable and stable  Respiratory status: unassisted, spontaneous ventilation and nasal cannula  Hydration status: euvolemic  Follow-up not needed.              Vitals Value Taken Time   /74 09/03/24 1554   Temp 36.7 °C (98 °F) 09/03/24 1545   Pulse 60 09/03/24 1554   Resp 12 09/03/24 1554   SpO2 100 % 09/03/24 1554   Vitals shown include unfiled device data.      No case tracking events are documented in the log.      Pain/Jair Score: No data recorded

## 2024-09-03 NOTE — PLAN OF CARE
Problem: Adult Inpatient Plan of Care  Goal: Optimal Comfort and Wellbeing  Outcome: Progressing     Problem: Gastrointestinal Bleeding  Goal: Optimal Coping with Acute Illness  Outcome: Progressing     Problem: Fall Injury Risk  Goal: Absence of Fall and Fall-Related Injury  Outcome: Progressing

## 2024-09-03 NOTE — CARE UPDATE
09/02/24 2021   Patient Assessment/Suction   Level of Consciousness (AVPU) alert   Respiratory Effort Unlabored   Expansion/Accessory Muscles/Retractions expansion symmetric   All Lung Fields Breath Sounds clear   Rhythm/Pattern, Respiratory depth regular;pattern regular   Cough Frequency infrequent   Cough Type good;nonproductive   PRE-TX-O2   Device (Oxygen Therapy) room air   SpO2 100 %   Pulse Oximetry Type Continuous   $ Pulse Oximetry - Multiple Charge Pulse Oximetry - Multiple   Pulse 85   Resp 13   Education   $ Education Oxygen;15 min

## 2024-09-03 NOTE — ASSESSMENT & PLAN NOTE
Patient with known Cirrhosis with Child's class Calculator for Child's score link- [email or web address]://[email or web address].com/[email or web address]/child-redman-score-cirrhosis-mortality#creator-insights. Co-morbidities are present and inclusive of ascites and anemia/pancytopenia.  MELD-Na score calculated; MELD 3.0: 38 at 9/3/2024  3:01 AM  MELD-Na: 38 at 9/3/2024  3:01 AM  Calculated from:  Serum Creatinine: 3.9 mg/dL (Using max of 3 mg/dL) at 9/3/2024  3:01 AM  Serum Sodium: 133 mmol/L at 9/3/2024  3:01 AM  Total Bilirubin: 20.3 mg/dL at 9/3/2024  3:01 AM  Serum Albumin: 2.7 g/dL at 9/3/2024  3:01 AM  INR(ratio): 1.9 at 9/3/2024  3:01 AM  Age at listing (hypothetical): 44 years  Sex: Male at 9/3/2024  3:01 AM      Continue chronic meds. Etiology likely ETOH. Will avoid any hepatotoxic meds, and monitor CBC/CMP/INR for synthetic function.     Patient ideally needs follow up with hepatology outpatient   Gastroenterology consulted   Started the patient on midodrine, octreotide, albumin   Follow up repeat CMP  Continue lactulose   Started on prednisone outpatient, we will continue for alcoholic hepatitis. Could be cause of elevated WBC?  Patient underwent abdominal paracentesis by Interventional Radiology with 5 L clear straw ascitic fluid on September 3, 2024.  Continue intravenous Rocephin daily

## 2024-09-03 NOTE — ASSESSMENT & PLAN NOTE
Reportedly had colonoscopy at [facility], need operative notes   There was banding done, patient unable to elaborate  Gastroenterology consulted   CT scan did show diverticulosis  Follow CBC and transfuse as needed.  Follow GI recommendations.  Maintain NPO for now.

## 2024-09-03 NOTE — ASSESSMENT & PLAN NOTE
Secondary to acute on chronic GI blood loss.  Will transfuse another unit of packed red blood cells now.  Anemia is likely due to chronic blood loss. Most recent hemoglobin and hematocrit are listed below.  Recent Labs     09/02/24  1535 09/03/24  0301   HGB 8.1* 6.2*   HCT 24.3* 19.0*       Plan  - Monitor serial CBC: Daily now.  Check hemoglobin/hematocrit q.8 hours.  - Transfuse PRBC if patient becomes hemodynamically unstable, symptomatic or H/H drops below 7/21.  - Patient has not received any PRBC transfusions to date  - Patient's anemia is currently stable  - follow up repeat CBC, iron panel, B12, folic acid   Continue folic acid supplement

## 2024-09-03 NOTE — CONSULTS
" INPATIENT NEPHROLOGY CONSULT   [facility] NEPHROLOGY    Washington Glasgow  09/03/2024    Reason for consultation:    Acute kidney injury    Chief Complaint:   Chief Complaint   Patient presents with    Jaundice    Rectal Bleeding          History of Present Illness:    Per H and P  Patient is a 44-year-old male with history of alcoholic liver cirrhosis, last alcoholic drink a month ago, lower GI bleed, anemia, seizure disorder, chronic kidney disease presents to the emergency room with complaints of abdominal distention and rectal bleeding. Patient was recently at [facility] on 08/28 for GI bleed at that time, received blood transfusion. Underwent a colonoscopy, patient and significant other unsure if anything was found. They report that the cause of the rectal bleeding is still unknown, reports that "banding" was done. Patient left AMA. Significant other reports there were discussions about undergoing tips procedure, this is still pending. Patient follows with Gastroenterology outpatient. Patient reports ongoing on and off rectal bleeding, and worsening abdominal distention-primary reason he came here. Patient denies fever, chills, nausea, vomiting, chest pain, shortness of breath. Review of labs on 08/29 showed WBC 31, hemoglobin 6.9, creatinine 4.97, bilirubin 21. On this admission WBC 36.13, hemoglobin 8.1, creatinine 4, bilirubin 22.9. Patient reports also following with Nephrology outpatient. Patient transferred from [facility] for paracentesis         Plan of Care:       Assessment:    Acute kidney injury  --fernandez catheter.  Need accurate I/Os  --urine Na  --urinalysis bland  --Avoid NSAIDS, Lopez II inhibitors, and other non-essential nephrotoxic agents  --iv fluids (NPO so needs some hydration)  --getting blood  -- I have discussed the risks and benefits of hemodialysis with the patient and his mother.  All of their questions were answered.  Risks include low blood pressure, stroke, heart attack, " seizure, infection, nausea, delirium, and death.  --on octreotide/midodrine/albumin for presumptive hepatorenal syndrome    Anemia secondary to GI bleed  --getting pRBC  --GI consult    Hyponatremia likely secondary to cirrhosis  --bnp low  --urine osm  --uric acid  --avoid hypotonic iv piggybacks    Acidosis secondary to jd  --lactic acid  --urine anion gap           Thank you for allowing us to participate in this patient's care. We will continue to follow.    Vital Signs:  Temp Readings from Last 3 Encounters:   09/03/24 97.8 °F (36.6 °C) (Oral)       Pulse Readings from Last 3 Encounters:   09/03/24 72       BP Readings from Last 3 Encounters:   09/03/24 118/75       Weight:  Wt Readings from Last 3 Encounters:   09/02/24 83 kg (182 lb 15.7 oz)       Past Medical & Surgical History:  Past Medical History:   Diagnosis Date    Liver disease     Seizures        History reviewed. No pertinent surgical history.    Past Social History:  Social History     Socioeconomic History    Marital status: Single   Tobacco Use    Smoking status: Never    Smokeless tobacco: Never   Substance and Sexual Activity    Alcohol use: Not Currently    Drug use: Never    Sexual activity: Not Currently     Social Determinants of Health     Financial Resource Strain: Low Risk  (9/3/2024)    Overall Financial Resource Strain (CARDIA)     Difficulty of Paying Living Expenses: Not hard at all   Food Insecurity: No Food Insecurity (9/3/2024)    Hunger Vital Sign     Worried About Running Out of Food in the Last Year: Never true     Ran Out of Food in the Last Year: Never true   Transportation Needs: No Transportation Needs (9/3/2024)    TRANSPORTATION NEEDS     Transportation : No   Physical Activity: Inactive (9/3/2024)    Exercise Vital Sign     Days of Exercise per Week: 0 days     Minutes of Exercise per Session: 0 min   Stress: Patient Declined (9/2/2024)    [employer] - Occupational Stress Questionnaire      Feeling of Stress : Patient declined   Housing Stability: Low Risk  (9/3/2024)    Housing Stability Vital Sign     Unable to Pay for Housing in the Last Year: No     Homeless in the Last Year: No       Medications:  No current facility-administered medications on file prior to encounter.     Current Outpatient Medications on File Prior to Encounter   Medication Sig Dispense Refill    folic acid (FOLVITE) 1 MG tablet Take 1,000 mcg by mouth once daily.      levETIRAcetam (KEPPRA) 500 MG Tab Take 500 mg by mouth 2 (two) times daily.      predniSONE (DELTASONE) 20 MG tablet Take 40 mg by mouth once daily.      PROTONIX 40 mg tablet Take 40 mg by mouth 2 (two) times daily.      sodium bicarbonate 650 MG tablet Take 650 mg by mouth 3 (three) times daily.      thiamine mononitrate, vit B1, (VITAMIN B-1, MONONITRATE,) 100 mg Tab Take 100 mg by mouth once daily.       Scheduled Meds:   albumin human 25%  25 g Intravenous BID    cefTRIAXone (Rocephin) IV (PEDS and ADULTS)  2 g Intravenous Q12H    folic acid  1 mg Oral Daily    lactulose  20 g Oral TID    levETIRAcetam (Keppra) IV (PEDS and ADULTS)  500 mg Intravenous Q12H    midodrine  10 mg Oral Q8H    mupirocin   Nasal BID    octreotide  100 mcg Subcutaneous Q8H    pantoprazole  40 mg Intravenous BID    predniSONE  40 mg Oral Daily    sodium bicarbonate  650 mg Oral TID    thiamine  100 mg Oral Daily     Continuous Infusions:  PRN Meds:.  Current Facility-Administered Medications:     0.9%  NaCl infusion (for blood administration), , Intravenous, Q24H PRN    magnesium oxide, 800 mg, Oral, PRN    magnesium oxide, 800 mg, Oral, PRN    ondansetron, 4 mg, Intravenous, Q6H PRN    oxyCODONE, 5 mg, Oral, Q4H PRN    potassium bicarbonate, 35 mEq, Oral, PRN    potassium bicarbonate, 50 mEq, Oral, PRN    potassium bicarbonate, 60 mEq, Oral, PRN    potassium, sodium phosphates, 2 packet, Oral, PRN    potassium, sodium phosphates, 2 packet, Oral, PRN    potassium, sodium phosphates, 2  "packet, Oral, PRN    Allergies:  Patient has no known allergies.    Past Family History:  Reviewed; refer to Hospitalist Admission Note    Review of Systems:  Review of Systems - All 14 systems reviewed and negative, except as noted in HPI    Physical Exam:    /75   Pulse 72   Temp 97.8 °F (36.6 °C) (Oral)   Resp 12   Ht 6' 1" (1.854 m)   Wt 83 kg (182 lb 15.7 oz)   SpO2 99%   BMI 24.14 kg/m²     General Appearance:    Ill appearing   Head:    Normocephalic, without obvious abnormality, atraumatic   Eyes:    PER, conjunctiva/corneas clear, EOM's intact in both eyes        Throat:   Lips, mucosa, and tongue normal; teeth and gums normal   Back:     Symmetric, no curvature, ROM normal, no CVA tenderness   Lungs:     Clear to auscultation bilaterally, respirations unlabored   Chest wall:    No tenderness or deformity   Heart:    Regular rate and rhythm, S1 and S2 normal, no murmur, rub   or gallop   Abdomen:     Mild tenderness   Extremities:   Extremities normal, atraumatic, no cyanosis or edema   Pulses:   2+ and symmetric all extremities   MSK:   No joint or muscle swelling, tenderness or deformity   Skin:   Skin color, texture, turgor normal, no rashes or lesions   Neurologic:      No flap     Results:  Lab Results   Component Value Date     (L) 09/03/2024    K 4.1 09/03/2024     09/03/2024    CO2 19 (L) 09/03/2024    BUN 64 (H) 09/03/2024    CREATININE 3.9 (H) 09/03/2024    CALCIUM 7.8 (L) 09/03/2024    ANIONGAP 11 09/03/2024    ESTGFRAFRICA >60 01/17/2022    EGFRNONAA >60 01/17/2022       Lab Results   Component Value Date    CALCIUM 7.8 (L) 09/03/2024       Recent Labs   Lab 09/03/24  0301   WBC 31.56*   RBC 1.94*   HGB 6.2*   HCT 19.0*   *   MCV 98   MCH 32.0*   MCHC 32.6        Imaging Results              US Abdomen Limited (Final result)  Result time 09/02/24 16:47:38   Procedure changed from US Abdomen Complete     Final result by Reva Méndez MD (09/02/24 16:47:38)    "                Impression:      As above      Electronically signed by: Reva Méndez MD  Date:    09/02/2024  Time:    16:47               Narrative:    EXAMINATION:  US ABDOMEN LIMITED    CLINICAL HISTORY:  ascites; Other ascites    TECHNIQUE:  Limited ultrasound of the abdomen for ascites    COMPARISON:  None.    FINDINGS:  There is ascites seen in all 4 quadrants with moderately large amount ascites seen.  .                                       CT Abdomen Pelvis  Without Contrast (Final result)  Result time 09/02/24 16:44:36      Final result by Reva Méndez MD (09/02/24 16:44:36)                   Impression:      Features of cirrhosis with ascites and nodular contour of the liver.  Mild diffuse bladder wall thickening versus incomplete distention recommend correlation clinically if clinical consideration for cystitis or chronic bladder outlet obstruction.  Diverticulosis without CT findings of acute diverticulitis.  Findings detailed above.      Electronically signed by: Reva Méndez MD  Date:    09/02/2024  Time:    16:44               Narrative:    EXAMINATION:  CT ABDOMEN PELVIS WITHOUT CONTRAST    CLINICAL HISTORY:  abdominal distention;    TECHNIQUE:  Low dose axial images, sagittal and coronal reformations were obtained from the lung bases to the pubic symphysis.  No p.o. contrast    COMPARISON:  None    FINDINGS:  Mild dependent hypoventilatory changes in visualized lung bases    Large volume of ascites.  Nodular contour the liver suggesting cirrhosis.  Suggestion of recanalization of the umbilical vein.  Spleen not enlarged. Splenule noted. Adrenal glands; slight diffuse thickening.    Pancreas unremarkable    Abdominal aorta no aneurysm    No calcified stones the gallbladder or CT findings of acute cholecystitis or biliary duct dilatation    Diverticulosis without CT findings of acute diverticulitis. Appendix; normal appearing appendix is thought to be visualized.    Kidney; no  hydronephrosis or opaque renal stone.  No ureteral dilatation, opaque ureteral stone or ureteral obstruction evident.  The urinary bladder is mildly distended at time of the exam. Wall appears mildly diffusely thickened recommend correlation clinically if clinical consideration for cystitis or chronic bladder outlet obstruction.  The prostate gland does not appear enlarged. There is small left inguinal hernia containing ascites and fat.    Subcutaneous edema.                                    Patient care was time spent personally by me on the following activities:   Obtaining a history  Examination of patient.  Providing medical care at the patients bedside.  Developing a treatment plan with patient or surrogate and bedside caregivers  Ordering and reviewing laboratory studies, radiographic studies, pulse oximetry.  Ordering and performing treatments and interventions.  Evaluation of patient's response to treatment.  Discussions with consultants while on the unit and immediately available to the patient.  Re-evaluation of the patient's condition.  Documentation in the medical record.       I have personally reviewed pertinent radiological imaging and reports.    Pablito Holloway MD  [facility]  [phone]

## 2024-09-03 NOTE — PROGRESS NOTES
"[facility] Medicine  Progress Note    Patient Name: Washington Glasgow  MRN: [id number]  Patient Class: IP- Inpatient   Admission Date: 9/2/2024  Length of Stay: 1 days  Attending Physician: Shiela Dias MD  Primary Care Provider: No primary care provider on file.        Subjective:     Principal Problem:Alcoholic cirrhosis of liver with ascites        HPI:  Patient is a 44-year-old male with history of alcoholic liver cirrhosis, last alcoholic drink a month ago, lower GI bleed, anemia, seizure disorder, chronic kidney disease presents to the emergency room with complaints of abdominal distention and rectal bleeding.  Patient was recently at [facility] on [place] for GI bleed at that time, received blood transfusion.  Underwent a colonoscopy, patient and significant other unsure if anything was found.  They report that the cause of the rectal bleeding is still unknown, reports that "banding" was done.  Patient left AMA.  Significant other reports there were discussions about undergoing tips procedure, this is still pending.  Patient follows with Gastroenterology outpatient.  Patient reports ongoing on and off rectal bleeding, and worsening abdominal distention-primary reason he came here.  Patient denies fever, chills, nausea, vomiting, chest pain, shortness of breath.  Review of labs on 08/29 showed WBC 31, hemoglobin 6.9, creatinine 4.97, bilirubin 21.  On this admission WBC 36.13, hemoglobin 8.1, creatinine 4, bilirubin 22.9.  Patient reports also following with Nephrology outpatient.  Patient transferred from [facility] for paracentesis.  Gastroenterology and Nephrology consulted.       Overview/Hospital Course:  No notes on file    Interval History:  Patient just returned after having abdominal paracentesis performed by Interventional Radiology went 5 L of clear straw-colored ascitic fluid removed.  Results pending.  Patient confused and mildly tremulous.  Patient's mother is " present at bedside.  Patient receiving 1st unit of packed red blood cells.  Currently afebrile.  Dr. Holloway from Nephrology here to see the patient with hepatorenal syndrome.    Review of Systems   Constitutional:  Positive for appetite change. Negative for chills and fever.   Respiratory:  Negative for chest tightness and shortness of breath.    Cardiovascular:  Negative for chest pain and palpitations.   Gastrointestinal:  Positive for abdominal distention and anal bleeding. Negative for nausea and vomiting.   Genitourinary:  Negative for difficulty urinating.   Musculoskeletal:  Negative for arthralgias.   Skin:  Positive for color change.   Neurological:  Negative for dizziness and light-headedness.   Psychiatric/Behavioral:  Negative for agitation.      Objective:     Vital Signs (Most Recent):  Temp: 98.4 °F (36.9 °C) (09/03/24 0703)  Pulse: 71 (09/03/24 0703)  Resp: (!) 22 (09/03/24 0703)  BP: 125/77 (09/03/24 0703)  SpO2: 100 % (09/03/24 0703) Vital Signs (24h Range):  Temp:  [96.5 °F (35.8 °C)-98.4 °F (36.9 °C)] 98.4 °F (36.9 °C)  Pulse:  [] 71  Resp:  [12-22] 22  SpO2:  [96 %-100 %] 100 %  BP: (108-149)/(59-80) 125/77     Weight: 83 kg (182 lb 15.7 oz)  Body mass index is 24.14 kg/m².    Intake/Output Summary (Last 24 hours) at 9/3/2024 0807  Last data filed at 9/2/2024 2308  Gross per 24 hour   Intake 440 ml   Output --   Net 440 ml         Physical Exam  Vitals reviewed.   Constitutional:       Appearance: He is ill-appearing.   HENT:      Head: Atraumatic.      Mouth/Throat:      Mouth: Mucous membranes are dry.   Cardiovascular:      Rate and Rhythm: Normal rate and regular rhythm.   Pulmonary:      Effort: No respiratory distress.      Breath sounds: No wheezing.   Abdominal:      General: There is distension.      Tenderness: There is no abdominal tenderness.   Musculoskeletal:         General: Swelling present.      Right lower leg: Edema present.      Left lower leg: Edema present.   Skin:      "General: Skin is dry.      Coloration: Skin is jaundiced.   Neurological:      General: No focal deficit present.      Mental Status: He is alert. Mental status is at baseline.             Significant Labs: All pertinent labs within the past 24 hours have been reviewed.  CBC:   Recent Labs   Lab 09/02/24  1535 09/03/24  0301   WBC 36.13* 31.56*   HGB 8.1* 6.2*   HCT 24.3* 19.0*    124*     CMP:   Recent Labs   Lab 09/02/24  1535 09/03/24  0301   * 133*   K 3.7 4.1    103   CO2 18* 19*   * 158*   BUN 61* 64*   CREATININE 4.0* 3.9*   CALCIUM 8.7 7.8*   PROT 4.9* 4.0*   ALBUMIN 2.4* 2.7*   BILITOT 22.9* 20.3*   ALKPHOS 177* 120   * 74*   ALT 68* 50*   ANIONGAP 14 11     Coagulation:   Recent Labs   Lab 09/02/24  1535 09/03/24  0301   INR 1.8* 1.9*   APTT 29.3  --      Troponin:   Recent Labs   Lab 09/02/24  1535 09/02/24 2029 09/02/24  2358   TROPONINI 0.030*  --   --    TROPONINIHS  --  33.3* 31.8*     TSH: No results for input(s): "TSH" in the last 4320 hours.  Urine Studies:   Recent Labs   Lab 09/02/24  1817   COLORU Yellow   APPEARANCEUA Clear   PHUR 6.0   SPECGRAV 1.015   PROTEINUA Trace*   GLUCUA Negative   KETONESU Negative   BILIRUBINUA 2+*   OCCULTUA Negative   NITRITE Negative   UROBILINOGEN Negative   LEUKOCYTESUR Negative     Ammonia: 63    Significant Imaging:   CT abdomen and Pelvis without contrast:  Features of cirrhosis with ascites and nodular contour of the liver. Mild diffuse bladder wall thickening versus incomplete distention recommend correlation clinically if clinical consideration for cystitis or chronic bladder outlet obstruction. Diverticulosis without CT findings of acute diverticulitis. Findings detailed above.     US abdomen Limited:  There is ascites seen in all 4 quadrants with moderately large amount ascites seen.     IR Paracentesis:  5 L of clear straw-colored ascitic fluid removed        Assessment/Plan:      * Alcoholic cirrhosis of liver with " ascites  Patient with known Cirrhosis with Child's class Calculator for Child's score link- https://www.mdcalc.com/calc/340/child-redman-score-cirrhosis-mortality#creator-insights. Co-morbidities are present and inclusive of ascites and anemia/pancytopenia.  MELD-Na score calculated; MELD 3.0: 38 at 9/3/2024  3:01 AM  MELD-Na: 38 at 9/3/2024  3:01 AM  Calculated from:  Serum Creatinine: 3.9 mg/dL (Using max of 3 mg/dL) at 9/3/2024  3:01 AM  Serum Sodium: 133 mmol/L at 9/3/2024  3:01 AM  Total Bilirubin: 20.3 mg/dL at 9/3/2024  3:01 AM  Serum Albumin: 2.7 g/dL at 9/3/2024  3:01 AM  INR(ratio): 1.9 at 9/3/2024  3:01 AM  Age at listing (hypothetical): 44 years  Sex: Male at 9/3/2024  3:01 AM      Continue chronic meds. Etiology likely ETOH. Will avoid any hepatotoxic meds, and monitor CBC/CMP/INR for synthetic function.     Patient ideally needs follow up with hepatology outpatient   Gastroenterology consulted   Started the patient on midodrine, octreotide, albumin   Follow up repeat CMP  Continue lactulose   Started on prednisone outpatient, we will continue for alcoholic hepatitis. Could be cause of elevated WBC?  Patient underwent abdominal paracentesis by Interventional Radiology with 5 L clear straw ascitic fluid on September 3, 2024.  Continue intravenous Rocephin daily    Hepatic encephalopathy  Tele monitoring, neuro checks.  Maintain patient on lactulose to ensure 3-4 bowel movements daily.  Check serum ammonia level in the morning.  Observe fall precautions.  Follow GI specialist recommendations.  Patient may require transfer to [facility] hepatology Department for further management.  Alcohol abstinence counseling where be performed later once patient is appropriate.      Rectal bleeding  Reportedly had colonoscopy at [facility], need operative notes   There was banding done, patient unable to elaborate  Gastroenterology consulted   CT scan did show diverticulosis  Follow CBC and transfuse as  needed.  Follow GI recommendations.  Maintain NPO for now.    Metabolic acidosis  Continue sodium bicarb      Seizure disorder  Continue Keppra      CKD (chronic kidney disease)  Creatine stable for now. BMP reviewed- noted Estimated Creatinine Clearance: 27.3 mL/min (A) (based on SCr of 3.9 mg/dL (H)). according to latest data. Based on current GFR, CKD stage is stage 4 - GFR 15-29.  Monitor UOP and serial BMP and adjust therapy as needed. Renally dose meds. Avoid nephrotoxic medications and procedures.    Creatinine seems to be improving since 08/29 which was 4.97  Nephrology consulted   Continue albumin, octreotide, midodrine for possible HRS.  Discussed with Dr. Holloway.  Maintain Macias catheter which patient is refusing.    Anemia  Secondary to acute on chronic GI blood loss.  Will transfuse another unit of packed red blood cells now.  Anemia is likely due to chronic blood loss. Most recent hemoglobin and hematocrit are listed below.  Recent Labs     09/02/24  1535 09/03/24  0301   HGB 8.1* 6.2*   HCT 24.3* 19.0*       Plan  - Monitor serial CBC: Daily now.  Check hemoglobin/hematocrit q.8 hours.  - Transfuse PRBC if patient becomes hemodynamically unstable, symptomatic or H/H drops below 7/21.  - Patient has not received any PRBC transfusions to date  - Patient's anemia is currently stable  - follow up repeat CBC, iron panel, B12, folic acid   Continue folic acid supplement    Discussed with patient's mother at bedside, answered all questions.  In my judgment, patient and family has poor insight about patient's underlying medical problem and critical nature of hepatorenal syndrome.  Answered all questions.  Will continue close follow-up and updating with the family.  VTE Risk Mitigation (From admission, onward)           Ordered     Place sequential compression device  Until discontinued         09/02/24 2045     IP VTE LOW RISK PATIENT  Once         09/02/24 2045                    Discharge Planning   ED:  9/9/2024     Code Status: Full Code   Is the patient medically ready for discharge?:     Reason for patient still in hospital (select all that apply): Patient trending condition and Consult recommendations               Critical care time spent on the evaluation and treatment of severe organ dysfunction, review of pertinent labs and imaging studies, discussions with consulting providers and discussions with patient/family: 35 minutes.      Horacio Hurtado MD  Department of Hospital Medicine   [facility]

## 2024-09-03 NOTE — NURSING
Nurses Note -- 4 Eyes      9/2/2024   8:50 PM      Skin assessed during: Admit      [x] No Altered Skin Integrity Present    []Prevention Measures Documented      [] Yes- Altered Skin Integrity Present or Discovered   [] LDA Added if Not in Epic (Describe Wound)   [] New Altered Skin Integrity was Present on Admit and Documented in LDA   [] Wound Image Taken    Wound Care Consulted? No    Attending Nurse:  Gualberto Tompkins RN/Staff Member:  Wayne RN/Luca TRIMBLE

## 2024-09-03 NOTE — ASSESSMENT & PLAN NOTE
Creatine stable for now. BMP reviewed- noted Estimated Creatinine Clearance: 27.3 mL/min (A) (based on SCr of 3.9 mg/dL (H)). according to latest data. Based on current GFR, CKD stage is stage 4 - GFR 15-29.  Monitor UOP and serial BMP and adjust therapy as needed. Renally dose meds. Avoid nephrotoxic medications and procedures.    Creatinine seems to be improving since 08/29 which was 4.97  Nephrology consulted   Continue albumin, octreotide, midodrine for possible HRS.  Discussed with Dr. Holloway.  Maintain Macias catheter which patient is refusing.

## 2024-09-03 NOTE — HPI
"Patient is a 44-year-old male with history of alcoholic liver cirrhosis, last alcoholic drink a month ago, lower GI bleed, anemia, seizure disorder, chronic kidney disease presents to the emergency room with complaints of abdominal distention and rectal bleeding.  Patient was recently at [facility] on 08/28 for GI bleed at that time, received blood transfusion.  Underwent a colonoscopy, patient and significant other unsure if anything was found.  They report that the cause of the rectal bleeding is still unknown, reports that "banding" was done.  Patient left AMA.  Significant other reports there were discussions about undergoing tips procedure, this is still pending.  Patient follows with Gastroenterology outpatient.  Patient reports ongoing on and off rectal bleeding, and worsening abdominal distention-primary reason he came here.  Patient denies fever, chills, nausea, vomiting, chest pain, shortness of breath.  Review of labs on 08/29 showed WBC 31, hemoglobin 6.9, creatinine 4.97, bilirubin 21.  On this admission WBC 36.13, hemoglobin 8.1, creatinine 4, bilirubin 22.9.  Patient reports also following with Nephrology outpatient.  Patient transferred from [facility] for paracentesis.  Gastroenterology and Nephrology consulted.     "

## 2024-09-04 LAB
ALBUMIN SERPL BCP-MCNC: 3 G/DL (ref 3.5–5.2)
ALP SERPL-CCNC: 108 U/L (ref 55–135)
ALT SERPL W/O P-5'-P-CCNC: 44 U/L (ref 10–44)
AMMONIA PLAS-SCNC: 97 UMOL/L (ref 10–50)
AMMONIA PLAS-SCNC: 97 UMOL/L (ref 10–50)
ANION GAP SERPL CALC-SCNC: 11 MMOL/L (ref 8–16)
AST SERPL-CCNC: 78 U/L (ref 10–40)
BASOPHILS # BLD AUTO: 0.03 K/UL (ref 0–0.2)
BASOPHILS NFR BLD: 0.1 % (ref 0–1.9)
BILIRUB SERPL-MCNC: 20.6 MG/DL (ref 0.1–1)
BUN SERPL-MCNC: 59 MG/DL (ref 6–20)
CALCIUM SERPL-MCNC: 8 MG/DL (ref 8.7–10.5)
CHLORIDE SERPL-SCNC: 105 MMOL/L (ref 95–110)
CO2 SERPL-SCNC: 20 MMOL/L (ref 23–29)
CREAT SERPL-MCNC: 3.5 MG/DL (ref 0.5–1.4)
DIFFERENTIAL METHOD BLD: ABNORMAL
EOSINOPHIL # BLD AUTO: 0 K/UL (ref 0–0.5)
EOSINOPHIL NFR BLD: 0.1 % (ref 0–8)
ERYTHROCYTE [DISTWIDTH] IN BLOOD BY AUTOMATED COUNT: 21 % (ref 11.5–14.5)
EST. GFR  (NO RACE VARIABLE): 21.2 ML/MIN/1.73 M^2
GLUCOSE SERPL-MCNC: 146 MG/DL (ref 70–110)
GRAM STN SPEC: NORMAL
HCT VFR BLD AUTO: 24.5 % (ref 40–54)
HGB BLD-MCNC: 8.4 G/DL (ref 14–18)
IMM GRANULOCYTES # BLD AUTO: 0.27 K/UL (ref 0–0.04)
IMM GRANULOCYTES NFR BLD AUTO: 0.8 % (ref 0–0.5)
INR PPP: 1.9 (ref 0.8–1.2)
LYMPHOCYTES # BLD AUTO: 0.6 K/UL (ref 1–4.8)
LYMPHOCYTES NFR BLD: 1.6 % (ref 18–48)
MCH RBC QN AUTO: 31.8 PG (ref 27–31)
MCHC RBC AUTO-ENTMCNC: 34.3 G/DL (ref 32–36)
MCV RBC AUTO: 93 FL (ref 82–98)
MONOCYTES # BLD AUTO: 1.1 K/UL (ref 0.3–1)
MONOCYTES NFR BLD: 3.3 % (ref 4–15)
NEUTROPHILS # BLD AUTO: 32.4 K/UL (ref 1.8–7.7)
NEUTROPHILS NFR BLD: 94.1 % (ref 38–73)
NRBC BLD-RTO: 0 /100 WBC
OSMOLALITY UR: 427 MOSM/KG (ref 50–1200)
PLATELET # BLD AUTO: 125 K/UL (ref 150–450)
PMV BLD AUTO: 12.8 FL (ref 9.2–12.9)
POTASSIUM SERPL-SCNC: 4.4 MMOL/L (ref 3.5–5.1)
PROT SERPL-MCNC: 4.2 G/DL (ref 6–8.4)
PROTHROMBIN TIME: 20.7 SEC (ref 9–12.5)
RBC # BLD AUTO: 2.64 M/UL (ref 4.6–6.2)
SODIUM SERPL-SCNC: 136 MMOL/L (ref 136–145)
URATE SERPL-MCNC: 6.5 MG/DL (ref 3.4–7)
WBC # BLD AUTO: 34.43 K/UL (ref 3.9–12.7)

## 2024-09-04 PROCEDURE — P9047 ALBUMIN (HUMAN), 25%, 50ML: HCPCS | Mod: JZ,JG | Performed by: STUDENT IN AN ORGANIZED HEALTH CARE EDUCATION/TRAINING PROGRAM

## 2024-09-04 PROCEDURE — 25000003 PHARM REV CODE 250: Performed by: STUDENT IN AN ORGANIZED HEALTH CARE EDUCATION/TRAINING PROGRAM

## 2024-09-04 PROCEDURE — 20000000 HC ICU ROOM

## 2024-09-04 PROCEDURE — 85025 COMPLETE CBC W/AUTO DIFF WBC: CPT | Mod: 91 | Performed by: INTERNAL MEDICINE

## 2024-09-04 PROCEDURE — 84550 ASSAY OF BLOOD/URIC ACID: CPT | Performed by: STUDENT IN AN ORGANIZED HEALTH CARE EDUCATION/TRAINING PROGRAM

## 2024-09-04 PROCEDURE — 85025 COMPLETE CBC W/AUTO DIFF WBC: CPT | Performed by: STUDENT IN AN ORGANIZED HEALTH CARE EDUCATION/TRAINING PROGRAM

## 2024-09-04 PROCEDURE — 82140 ASSAY OF AMMONIA: CPT | Performed by: STUDENT IN AN ORGANIZED HEALTH CARE EDUCATION/TRAINING PROGRAM

## 2024-09-04 PROCEDURE — 85610 PROTHROMBIN TIME: CPT | Performed by: STUDENT IN AN ORGANIZED HEALTH CARE EDUCATION/TRAINING PROGRAM

## 2024-09-04 PROCEDURE — 36415 COLL VENOUS BLD VENIPUNCTURE: CPT | Performed by: STUDENT IN AN ORGANIZED HEALTH CARE EDUCATION/TRAINING PROGRAM

## 2024-09-04 PROCEDURE — 83935 ASSAY OF URINE OSMOLALITY: CPT | Performed by: INTERNAL MEDICINE

## 2024-09-04 PROCEDURE — 94761 N-INVAS EAR/PLS OXIMETRY MLT: CPT

## 2024-09-04 PROCEDURE — 25000003 PHARM REV CODE 250: Performed by: HOSPITALIST

## 2024-09-04 PROCEDURE — 36415 COLL VENOUS BLD VENIPUNCTURE: CPT | Performed by: INTERNAL MEDICINE

## 2024-09-04 PROCEDURE — 80053 COMPREHEN METABOLIC PANEL: CPT | Performed by: STUDENT IN AN ORGANIZED HEALTH CARE EDUCATION/TRAINING PROGRAM

## 2024-09-04 PROCEDURE — 63600175 PHARM REV CODE 636 W HCPCS: Performed by: STUDENT IN AN ORGANIZED HEALTH CARE EDUCATION/TRAINING PROGRAM

## 2024-09-04 RX ADMIN — MUPIROCIN 1 G: 20 OINTMENT TOPICAL at 09:09

## 2024-09-04 RX ADMIN — SODIUM BICARBONATE 650 MG TABLET 650 MG: at 09:09

## 2024-09-04 RX ADMIN — SODIUM BICARBONATE 650 MG TABLET 650 MG: at 02:09

## 2024-09-04 RX ADMIN — THIAMINE HCL TAB 100 MG 100 MG: 100 TAB at 09:09

## 2024-09-04 RX ADMIN — FOLIC ACID 1 MG: 1 TABLET ORAL at 09:09

## 2024-09-04 RX ADMIN — MIDODRINE HYDROCHLORIDE 10 MG: 10 TABLET ORAL at 09:09

## 2024-09-04 RX ADMIN — OCTREOTIDE ACETATE 100 MCG: 100 INJECTION, SOLUTION INTRAVENOUS; SUBCUTANEOUS at 02:09

## 2024-09-04 RX ADMIN — LEVETIRACETAM INJECTION 500 MG: 5 INJECTION INTRAVENOUS at 09:09

## 2024-09-04 RX ADMIN — PREDNISONE 40 MG: 20 TABLET ORAL at 09:09

## 2024-09-04 RX ADMIN — PANTOPRAZOLE SODIUM 40 MG: 40 INJECTION, POWDER, FOR SOLUTION INTRAVENOUS at 09:09

## 2024-09-04 RX ADMIN — LACTULOSE 20 G: 20 SOLUTION ORAL at 09:09

## 2024-09-04 RX ADMIN — MIDODRINE HYDROCHLORIDE 10 MG: 10 TABLET ORAL at 06:09

## 2024-09-04 RX ADMIN — ALBUMIN (HUMAN) 25 G: 0.25 INJECTION, SOLUTION INTRAVENOUS at 09:09

## 2024-09-04 RX ADMIN — CEFTRIAXONE SODIUM 2 G: 2 INJECTION, POWDER, FOR SOLUTION INTRAMUSCULAR; INTRAVENOUS at 05:09

## 2024-09-04 RX ADMIN — CEFTRIAXONE SODIUM 2 G: 2 INJECTION, POWDER, FOR SOLUTION INTRAMUSCULAR; INTRAVENOUS at 06:09

## 2024-09-04 NOTE — CONSULTS
" INPATIENT NEPHROLOGY CONSULT   [facility] NEPHROLOGY    [place]  09/04/2024    Reason for consultation:    Acute kidney injury    Chief Complaint:   Chief Complaint   Patient presents with    Jaundice    Rectal Bleeding          History of Present Illness:    Per H and P  Patient is a 44-year-old male with history of alcoholic liver cirrhosis, last alcoholic drink a month ago, lower GI bleed, anemia, seizure disorder, chronic kidney disease presents to the emergency room with complaints of abdominal distention and rectal bleeding. Patient was recently at [facility] on 08/28 for GI bleed at that time, received blood transfusion. Underwent a colonoscopy, patient and significant other unsure if anything was found. They report that the cause of the rectal bleeding is still unknown, reports that "banding" was done. Patient left AMA. Significant other reports there were discussions about undergoing tips procedure, this is still pending. Patient follows with Gastroenterology outpatient. Patient reports ongoing on and off rectal bleeding, and worsening abdominal distention-primary reason he came here. Patient denies fever, chills, nausea, vomiting, chest pain, shortness of breath. Review of labs on 08/29 showed WBC 31, hemoglobin 6.9, creatinine 4.97, bilirubin 21. On this admission WBC 36.13, hemoglobin 8.1, creatinine 4, bilirubin 22.9. Patient reports also following with Nephrology outpatient. Patient transferred from [facility] for paracentesis     9/4  AFVSS, output not measured due to pt refusing to go in the urinal, no cp, sob, nausea.  More alert today.  Refused fernandez placement.  Colonoscopy limited due to stool.  1cm ulcer above dentate line seen.      Plan of Care:       Assessment:    Acute kidney injury    --urine Na 33  --urinalysis bland  --Avoid NSAIDS, Lopez II inhibitors, and other non-essential nephrotoxic agents  --iv fluids (NPO so needs some hydration)  --getting blood  -- I have " discussed the risks and benefits of hemodialysis with the patient and his mother.  All of their questions were answered.  Risks include low blood pressure, stroke, heart attack, seizure, infection, nausea, delirium, and death.  --on octreotide/midodrine/albumin for presumptive hepatorenal syndrome    Anemia secondary to GI bleed  --getting pRBC  --GI consulted    Hyponatremia likely secondary to cirrhosis  --bnp low  --urine osm  --uric acid  --avoid hypotonic iv piggybacks    Acidosis secondary to jd  --lactic acid  --urine anion gap           Thank you for allowing us to participate in this patient's care. We will continue to follow.    Vital Signs:  Temp Readings from Last 3 Encounters:   09/04/24 97.6 °F (36.4 °C) (Oral)       Pulse Readings from Last 3 Encounters:   09/04/24 60   09/03/24 66       BP Readings from Last 3 Encounters:   09/04/24 103/71   09/03/24 109/75       Weight:  Wt Readings from Last 3 Encounters:   09/02/24 83 kg (182 lb 15.7 oz)       Past Medical & Surgical History:  Past Medical History:   Diagnosis Date    Liver disease     Seizures        Past Surgical History:   Procedure Laterality Date    FLEXIBLE SIGMOIDOSCOPY N/A 9/3/2024    Procedure: SIGMOIDOSCOPY, FLEXIBLE;  Surgeon: Jamie Reich III, MD;  Location: [facility];  Service: Endoscopy;  Laterality: N/A;       Past Social History:  Social History     Socioeconomic History    Marital status: Single   Tobacco Use    Smoking status: Never    Smokeless tobacco: Never   Substance and Sexual Activity    Alcohol use: Not Currently    Drug use: Never    Sexual activity: Not Currently     Social Determinants of Health     Financial Resource Strain: Low Risk  (9/3/2024)    Overall Financial Resource Strain (CARDIA)     Difficulty of Paying Living Expenses: Not hard at all   Food Insecurity: No Food Insecurity (9/3/2024)    Hunger Vital Sign     Worried About Running Out of Food in the Last Year: Never true     Ran Out of Food in the  Last Year: Never true   Transportation Needs: No Transportation Needs (9/3/2024)    TRANSPORTATION NEEDS     Transportation : No   Physical Activity: Inactive (9/3/2024)    Exercise Vital Sign     Days of Exercise per Week: 0 days     Minutes of Exercise per Session: 0 min   Stress: Patient Declined (9/2/2024)    [employer] - Occupational Stress Questionnaire     Feeling of Stress : Patient declined   Housing Stability: Low Risk  (9/3/2024)    Housing Stability Vital Sign     Unable to Pay for Housing in the Last Year: No     Homeless in the Last Year: No       Medications:  No current facility-administered medications on file prior to encounter.     Current Outpatient Medications on File Prior to Encounter   Medication Sig Dispense Refill    folic acid (FOLVITE) 1 MG tablet Take 1,000 mcg by mouth once daily.      levETIRAcetam (KEPPRA) 500 MG Tab Take 500 mg by mouth 2 (two) times daily.      predniSONE (DELTASONE) 20 MG tablet Take 40 mg by mouth once daily.      PROTONIX 40 mg tablet Take 40 mg by mouth 2 (two) times daily.      sodium bicarbonate 650 MG tablet Take 650 mg by mouth 3 (three) times daily.      thiamine mononitrate, vit B1, (VITAMIN B-1, MONONITRATE,) 100 mg Tab Take 100 mg by mouth once daily.       Scheduled Meds:   albumin human 25%  25 g Intravenous BID    cefTRIAXone (Rocephin) IV (PEDS and ADULTS)  2 g Intravenous Q12H    folic acid  1 mg Oral Daily    lactulose  20 g Oral TID    levETIRAcetam (Keppra) IV (PEDS and ADULTS)  500 mg Intravenous Q12H    midodrine  10 mg Oral Q8H    mupirocin   Nasal BID    octreotide  100 mcg Subcutaneous Q8H    pantoprazole  40 mg Intravenous BID    predniSONE  40 mg Oral Daily    sodium bicarbonate  650 mg Oral TID    thiamine  100 mg Oral Daily     Continuous Infusions:  PRN Meds:.  Current Facility-Administered Medications:     0.9%  NaCl infusion (for blood administration), , Intravenous, Q24H PRN    0.9%  NaCl infusion (for  "blood administration), , Intravenous, Q24H PRN    magnesium oxide, 800 mg, Oral, PRN    magnesium oxide, 800 mg, Oral, PRN    ondansetron, 4 mg, Intravenous, Q6H PRN    oxyCODONE, 5 mg, Oral, Q4H PRN    potassium bicarbonate, 35 mEq, Oral, PRN    potassium bicarbonate, 50 mEq, Oral, PRN    potassium bicarbonate, 60 mEq, Oral, PRN    potassium, sodium phosphates, 2 packet, Oral, PRN    potassium, sodium phosphates, 2 packet, Oral, PRN    potassium, sodium phosphates, 2 packet, Oral, PRN    Allergies:  Patient has no known allergies.    Past Family History:  Reviewed; refer to Hospitalist Admission Note    Review of Systems:  Review of Systems - All 14 systems reviewed and negative, except as noted in HPI    Physical Exam:    /71   Pulse 60   Temp 97.6 °F (36.4 °C) (Oral)   Resp 20   Ht 6' 1" (1.854 m)   Wt 83 kg (182 lb 15.7 oz)   SpO2 99%   BMI 24.14 kg/m²     General Appearance:    Ill appearing   Head:    Normocephalic, without obvious abnormality, atraumatic   Eyes:    PER, conjunctiva/corneas clear, EOM's intact in both eyes        Throat:   Lips, mucosa, and tongue normal; teeth and gums normal   Back:     Symmetric, no curvature, ROM normal, no CVA tenderness   Lungs:     Clear to auscultation bilaterally, respirations unlabored   Chest wall:    No tenderness or deformity   Heart:    Regular rate and rhythm, S1 and S2 normal, no murmur, rub   or gallop   Abdomen:     Mild tenderness   Extremities:   Extremities normal, atraumatic, no cyanosis or edema   Pulses:   2+ and symmetric all extremities   MSK:   No joint or muscle swelling, tenderness or deformity   Skin:   Skin color, texture, turgor normal, no rashes or lesions   Neurologic:      No flap     Results:  Lab Results   Component Value Date     09/04/2024    K 4.4 09/04/2024     09/04/2024    CO2 20 (L) 09/04/2024    BUN 59 (H) 09/04/2024    CREATININE 3.5 (H) 09/04/2024    CALCIUM 8.0 (L) 09/04/2024    ANIONGAP 11 09/04/2024    " ESTGFRAFRICA >60 01/17/2022    EGFRNONAA >60 01/17/2022       Lab Results   Component Value Date    CALCIUM 8.0 (L) 09/04/2024       Recent Labs   Lab 09/04/24  0238   WBC 34.43*   RBC 2.64*   HGB 8.4*   HCT 24.5*   *   MCV 93   MCH 31.8*   MCHC 34.3        Imaging Results              US Abdomen Limited (Final result)  Result time 09/02/24 16:47:38   Procedure changed from US Abdomen Complete     Final result by Reva Méndez MD (09/02/24 16:47:38)                   Impression:      As above      Electronically signed by: Reva Méndez MD  Date:    09/02/2024  Time:    16:47               Narrative:    EXAMINATION:  US ABDOMEN LIMITED    CLINICAL HISTORY:  ascites; Other ascites    TECHNIQUE:  Limited ultrasound of the abdomen for ascites    COMPARISON:  None.    FINDINGS:  There is ascites seen in all 4 quadrants with moderately large amount ascites seen.  .                                       CT Abdomen Pelvis  Without Contrast (Final result)  Result time 09/02/24 16:44:36      Final result by Reva Méndez MD (09/02/24 16:44:36)                   Impression:      Features of cirrhosis with ascites and nodular contour of the liver.  Mild diffuse bladder wall thickening versus incomplete distention recommend correlation clinically if clinical consideration for cystitis or chronic bladder outlet obstruction.  Diverticulosis without CT findings of acute diverticulitis.  Findings detailed above.      Electronically signed by: Reva Méndez MD  Date:    09/02/2024  Time:    16:44               Narrative:    EXAMINATION:  CT ABDOMEN PELVIS WITHOUT CONTRAST    CLINICAL HISTORY:  abdominal distention;    TECHNIQUE:  Low dose axial images, sagittal and coronal reformations were obtained from the lung bases to the pubic symphysis.  No p.o. contrast    COMPARISON:  None    FINDINGS:  Mild dependent hypoventilatory changes in visualized lung bases    Large volume of ascites.  Nodular contour the liver  suggesting cirrhosis.  Suggestion of recanalization of the umbilical vein.  Spleen not enlarged. Splenule noted. Adrenal glands; slight diffuse thickening.    Pancreas unremarkable    Abdominal aorta no aneurysm    No calcified stones the gallbladder or CT findings of acute cholecystitis or biliary duct dilatation    Diverticulosis without CT findings of acute diverticulitis. Appendix; normal appearing appendix is thought to be visualized.    Kidney; no hydronephrosis or opaque renal stone.  No ureteral dilatation, opaque ureteral stone or ureteral obstruction evident.  The urinary bladder is mildly distended at time of the exam. Wall appears mildly diffusely thickened recommend correlation clinically if clinical consideration for cystitis or chronic bladder outlet obstruction.  The prostate gland does not appear enlarged. There is small left inguinal hernia containing ascites and fat.    Subcutaneous edema.                                    Patient care was time spent personally by me on the following activities:   Obtaining a history  Examination of patient.  Providing medical care at the patients bedside.  Developing a treatment plan with patient or surrogate and bedside caregivers  Ordering and reviewing laboratory studies, radiographic studies, pulse oximetry.  Ordering and performing treatments and interventions.  Evaluation of patient's response to treatment.  Discussions with consultants while on the unit and immediately available to the patient.  Re-evaluation of the patient's condition.  Documentation in the medical record.       I have personally reviewed pertinent radiological imaging and reports.    Pablito Holloway MD  [facility] [place]  [phone]

## 2024-09-04 NOTE — SUBJECTIVE & OBJECTIVE
Interval History:  Patient lethargic and exhibiting intermittent confusion.  Patient's mother is present at bedside.  Currently afebrile.  Patient has been refusing lactulose with elevated serum ammonia.  Status post abdominal paracentesis with 5 L ascitic fluid removed.  No evidence of spontaneous bacterial peritonitis.  Status post 2 units of packed red blood cells.  Patient underwent flexible sigmoidoscopy yesterday showing linear ulceration at dentate line.  GI and nephrology teams swallowing for management of hepatorenal syndrome.    Review of Systems   Constitutional:  Positive for appetite change. Negative for chills and fever.   Respiratory:  Negative for chest tightness and shortness of breath.    Cardiovascular:  Negative for chest pain and palpitations.   Gastrointestinal:  Positive for abdominal distention and anal bleeding. Negative for nausea and vomiting.   Genitourinary:  Negative for difficulty urinating.   Musculoskeletal:  Negative for arthralgias.   Skin:  Positive for color change.   Neurological:  Negative for dizziness and light-headedness.   Psychiatric/Behavioral:  Negative for agitation.      Objective:     Vital Signs (Most Recent):  Temp: 97.6 °F (36.4 °C) (09/04/24 0301)  Pulse: 60 (09/04/24 0500)  Resp: 20 (09/04/24 0500)  BP: 103/71 (09/04/24 0500)  SpO2: 99 % (09/04/24 0500) Vital Signs (24h Range):  Temp:  [97.5 °F (36.4 °C)-98 °F (36.7 °C)] 97.6 °F (36.4 °C)  Pulse:  [54-95] 60  Resp:  [10-32] 20  SpO2:  [93 %-100 %] 99 %  BP: (103-147)/(61-90) 103/71     Weight: 83 kg (182 lb 15.7 oz)  Body mass index is 24.14 kg/m².    Intake/Output Summary (Last 24 hours) at 9/4/2024 0733  Last data filed at 9/4/2024 0508  Gross per 24 hour   Intake 1524 ml   Output 5210 ml   Net -3686 ml         Physical Exam  Vitals reviewed.   Constitutional:       Appearance: He is ill-appearing.   HENT:      Head: Atraumatic.      Mouth/Throat:      Mouth: Mucous membranes are dry.   Cardiovascular:       "Rate and Rhythm: Normal rate and regular rhythm.   Pulmonary:      Effort: No respiratory distress.      Breath sounds: No wheezing.   Abdominal:      General: There is distension.      Tenderness: There is no abdominal tenderness.   Musculoskeletal:         General: Swelling present.      Right lower leg: Edema present.      Left lower leg: Edema present.   Skin:     General: Skin is dry.      Coloration: Skin is jaundiced.   Neurological:      General: No focal deficit present.      Mental Status: He is alert. Mental status is at baseline.             Significant Labs: All pertinent labs within the past 24 hours have been reviewed.  CBC:   Recent Labs   Lab 09/03/24 0301 09/03/24 1717 09/04/24 0238   WBC 31.56* 34.93* 34.43*   HGB 6.2* 9.2* 8.4*   HCT 19.0* 27.5* 24.5*   * 117* 125*     CMP:   Recent Labs   Lab 09/02/24 1535 09/03/24 0301 09/04/24 0238   * 133* 136   K 3.7 4.1 4.4    103 105   CO2 18* 19* 20*   * 158* 146*   BUN 61* 64* 59*   CREATININE 4.0* 3.9* 3.5*   CALCIUM 8.7 7.8* 8.0*   PROT 4.9* 4.0* 4.2*   ALBUMIN 2.4* 2.7* 3.0*   BILITOT 22.9* 20.3* 20.6*   ALKPHOS 177* 120 108   * 74* 78*   ALT 68* 50* 44   ANIONGAP 14 11 11     Coagulation:   Recent Labs   Lab 09/02/24 1535 09/03/24 0301 09/04/24  0238   INR 1.8*   < > 1.9*   APTT 29.3  --   --     < > = values in this interval not displayed.     Troponin:   Recent Labs   Lab 09/02/24 1535 09/02/24 2029 09/02/24  2358   TROPONINI 0.030*  --   --    TROPONINIHS  --  33.3* 31.8*     TSH: No results for input(s): "TSH" in the last 4320 hours.  Urine Studies:   Recent Labs   Lab 09/02/24  1817   COLORU Yellow   APPEARANCEUA Clear   PHUR 6.0   SPECGRAV 1.015   PROTEINUA Trace*   GLUCUA Negative   KETONESU Negative   BILIRUBINUA 2+*   OCCULTUA Negative   NITRITE Negative   UROBILINOGEN Negative   LEUKOCYTESUR Negative     Ammonia: 63    Significant Imaging:   CT abdomen and Pelvis without contrast:  Features of " cirrhosis with ascites and nodular contour of the liver. Mild diffuse bladder wall thickening versus incomplete distention recommend correlation clinically if clinical consideration for cystitis or chronic bladder outlet obstruction. Diverticulosis without CT findings of acute diverticulitis. Findings detailed above.     US abdomen Limited:  There is ascites seen in all 4 quadrants with moderately large amount ascites seen.     IR Paracentesis:  5 L of clear straw-colored ascitic fluid removed    Flexible sigmoidoscopy:  -Limited views of rectosigmoid/rectum normal due to amount of stool  -1cm ulceration above dentate line w/ erythema and slight ooze with manipulation. No interventions performed.

## 2024-09-04 NOTE — PROGRESS NOTES
"[facility] Medicine  Progress Note    Patient Name: Washington Glasgow  MRN: [id number]  Patient Class: IP- Inpatient   Admission Date: 9/2/2024  Length of Stay: 2 days  Attending Physician: Horacio Hurtado MD  Primary Care Provider: Monika, Primary Doctor        Subjective:     Principal Problem:Alcoholic cirrhosis of liver with ascites        HPI:  Patient is a 44-year-old male with history of alcoholic liver cirrhosis, last alcoholic drink a month ago, lower GI bleed, anemia, seizure disorder, chronic kidney disease presents to the emergency room with complaints of abdominal distention and rectal bleeding.  Patient was recently at [facility] on 08/28 for GI bleed at that time, received blood transfusion.  Underwent a colonoscopy, patient and significant other unsure if anything was found.  They report that the cause of the rectal bleeding is still unknown, reports that "banding" was done.  Patient left AMA.  Significant other reports there were discussions about undergoing tips procedure, this is still pending.  Patient follows with Gastroenterology outpatient.  Patient reports ongoing on and off rectal bleeding, and worsening abdominal distention-primary reason he came here.  Patient denies fever, chills, nausea, vomiting, chest pain, shortness of breath.  Review of labs on 08/29 showed WBC 31, hemoglobin 6.9, creatinine 4.97, bilirubin 21.  On this admission WBC 36.13, hemoglobin 8.1, creatinine 4, bilirubin 22.9.  Patient reports also following with Nephrology outpatient.  Patient transferred from [facility] for paracentesis.  Gastroenterology and Nephrology consulted.       Overview/Hospital Course:  Patient admitted to intensive care unit.  Hemoglobin and hematocrit closely monitored.  Patient received 2 units of packed red blood cells.  Gastroenterology and nephrology team consulted.  Patient maintained on midodrine, octreotide and albumin therapy.  Patient underwent abdominal " paracentesis with 5 L ascitic straw-colored fluid removal.  Patient maintained on intravenous ceftriaxone prophylaxis.  Microbiology closely followed.  Patient maintained on lactulose.  Serum ammonia trended.  Patient maintained on prednisone therapy for alcoholic hepatitis management.    Interval History:  Patient lethargic and exhibiting intermittent confusion.  Patient's mother is present at bedside.  Currently afebrile.  Patient has been refusing lactulose with elevated serum ammonia.  Status post abdominal paracentesis with 5 L ascitic fluid removed.  No evidence of spontaneous bacterial peritonitis.  Status post 2 units of packed red blood cells.  Patient underwent flexible sigmoidoscopy yesterday showing linear ulceration at dentate line.  GI and nephrology teams swallowing for management of hepatorenal syndrome.    Review of Systems   Constitutional:  Positive for appetite change. Negative for chills and fever.   Respiratory:  Negative for chest tightness and shortness of breath.    Cardiovascular:  Negative for chest pain and palpitations.   Gastrointestinal:  Positive for abdominal distention and anal bleeding. Negative for nausea and vomiting.   Genitourinary:  Negative for difficulty urinating.   Musculoskeletal:  Negative for arthralgias.   Skin:  Positive for color change.   Neurological:  Negative for dizziness and light-headedness.   Psychiatric/Behavioral:  Negative for agitation.      Objective:     Vital Signs (Most Recent):  Temp: 97.6 °F (36.4 °C) (09/04/24 0301)  Pulse: 60 (09/04/24 0500)  Resp: 20 (09/04/24 0500)  BP: 103/71 (09/04/24 0500)  SpO2: 99 % (09/04/24 0500) Vital Signs (24h Range):  Temp:  [97.5 °F (36.4 °C)-98 °F (36.7 °C)] 97.6 °F (36.4 °C)  Pulse:  [54-95] 60  Resp:  [10-32] 20  SpO2:  [93 %-100 %] 99 %  BP: (103-147)/(61-90) 103/71     Weight: 83 kg (182 lb 15.7 oz)  Body mass index is 24.14 kg/m².    Intake/Output Summary (Last 24 hours) at 9/4/2024 3123  Last data filed at  "9/4/2024 0508  Gross per 24 hour   Intake 1524 ml   Output 5210 ml   Net -3686 ml         Physical Exam  Vitals reviewed.   Constitutional:       Appearance: He is ill-appearing.   HENT:      Head: Atraumatic.      Mouth/Throat:      Mouth: Mucous membranes are dry.   Cardiovascular:      Rate and Rhythm: Normal rate and regular rhythm.   Pulmonary:      Effort: No respiratory distress.      Breath sounds: No wheezing.   Abdominal:      General: There is distension.      Tenderness: There is no abdominal tenderness.   Musculoskeletal:         General: Swelling present.      Right lower leg: Edema present.      Left lower leg: Edema present.   Skin:     General: Skin is dry.      Coloration: Skin is jaundiced.   Neurological:      General: No focal deficit present.      Mental Status: He is alert. Mental status is at baseline.             Significant Labs: All pertinent labs within the past 24 hours have been reviewed.  CBC:   Recent Labs   Lab 09/03/24 0301 09/03/24 1717 09/04/24 0238   WBC 31.56* 34.93* 34.43*   HGB 6.2* 9.2* 8.4*   HCT 19.0* 27.5* 24.5*   * 117* 125*     CMP:   Recent Labs   Lab 09/02/24 1535 09/03/24 0301 09/04/24  0238   * 133* 136   K 3.7 4.1 4.4    103 105   CO2 18* 19* 20*   * 158* 146*   BUN 61* 64* 59*   CREATININE 4.0* 3.9* 3.5*   CALCIUM 8.7 7.8* 8.0*   PROT 4.9* 4.0* 4.2*   ALBUMIN 2.4* 2.7* 3.0*   BILITOT 22.9* 20.3* 20.6*   ALKPHOS 177* 120 108   * 74* 78*   ALT 68* 50* 44   ANIONGAP 14 11 11     Coagulation:   Recent Labs   Lab 09/02/24 1535 09/03/24 0301 09/04/24  0238   INR 1.8*   < > 1.9*   APTT 29.3  --   --     < > = values in this interval not displayed.     Troponin:   Recent Labs   Lab 09/02/24 1535 09/02/24 2029 09/02/24  2358   TROPONINI 0.030*  --   --    TROPONINIHS  --  33.3* 31.8*     TSH: No results for input(s): "TSH" in the last 4320 hours.  Urine Studies:   Recent Labs   Lab 09/02/24  1817   COLORU Yellow   APPEARANCEUA Clear "   PHUR 6.0   SPECGRAV 1.015   PROTEINUA Trace*   GLUCUA Negative   KETONESU Negative   BILIRUBINUA 2+*   OCCULTUA Negative   NITRITE Negative   UROBILINOGEN Negative   LEUKOCYTESUR Negative     Ammonia: 63    Significant Imaging:   CT abdomen and Pelvis without contrast:  Features of cirrhosis with ascites and nodular contour of the liver. Mild diffuse bladder wall thickening versus incomplete distention recommend correlation clinically if clinical consideration for cystitis or chronic bladder outlet obstruction. Diverticulosis without CT findings of acute diverticulitis. Findings detailed above.     US abdomen Limited:  There is ascites seen in all 4 quadrants with moderately large amount ascites seen.     IR Paracentesis:  5 L of clear straw-colored ascitic fluid removed    Flexible sigmoidoscopy:  -Limited views of rectosigmoid/rectum normal due to amount of stool  -1cm ulceration above dentate line w/ erythema and slight ooze with manipulation. No interventions performed.     Assessment/Plan:      * Alcoholic cirrhosis of liver with ascites  Patient with known Cirrhosis with Child's class Calculator for Child's score link- https://www.[employer]/calc/340/child-redman-score-cirrhosis-mortality#creator-insights. Co-morbidities are present and inclusive of ascites and anemia/pancytopenia.  MELD-Na score calculated; MELD 3.0: 38 at 9/3/2024  3:01 AM  MELD-Na: 38 at 9/3/2024  3:01 AM  Calculated from:  Serum Creatinine: 3.9 mg/dL (Using max of 3 mg/dL) at 9/3/2024  3:01 AM  Serum Sodium: 133 mmol/L at 9/3/2024  3:01 AM  Total Bilirubin: 20.3 mg/dL at 9/3/2024  3:01 AM  Serum Albumin: 2.7 g/dL at 9/3/2024  3:01 AM  INR(ratio): 1.9 at 9/3/2024  3:01 AM  Age at listing (hypothetical): 44 years  Sex: Male at 9/3/2024  3:01 AM      Continue chronic meds. Etiology likely ETOH. Will avoid any hepatotoxic meds, and monitor CBC/CMP/INR for synthetic function.     Patient ideally needs follow up with hepatology outpatient    Gastroenterology consulted   Started the patient on midodrine, octreotide, albumin   Follow up repeat CMP  Continue lactulose   Started on prednisone outpatient, we will continue for alcoholic hepatitis. Could be cause of elevated WBC?  Patient underwent abdominal paracentesis by Interventional Radiology with 5 L clear straw ascitic fluid on September 3, 2024.  Continue intravenous Rocephin daily    Hepatic encephalopathy  Tele monitoring, neuro checks.  Maintain patient on lactulose to ensure 3-4 bowel movements daily.  Check serum ammonia level in the morning.  Observe fall precautions.  Follow GI specialist recommendations.  Patient may require transfer to [facility] hepatology Department for further management.  Alcohol abstinence counseling where be performed later once patient is appropriate.      Rectal bleeding  Reportedly had colonoscopy at [facility], need operative notes   There was banding done, patient unable to elaborate  Gastroenterology consulted   CT scan did show diverticulosis  Follow CBC and transfuse as needed.  Follow GI recommendations.  Maintain NPO for now.    Metabolic acidosis  Continue sodium bicarb      Seizure disorder  Continue Keppra      CKD (chronic kidney disease)  Creatine stable for now. BMP reviewed- noted Estimated Creatinine Clearance: 27.3 mL/min (A) (based on SCr of 3.9 mg/dL (H)). according to latest data. Based on current GFR, CKD stage is stage 4 - GFR 15-29.  Monitor UOP and serial BMP and adjust therapy as needed. Renally dose meds. Avoid nephrotoxic medications and procedures.    Creatinine seems to be improving since 08/29 which was 4.97  Nephrology consulted   Continue albumin, octreotide, midodrine for possible HRS.  Discussed with Dr. Holloway.  Maintain Macias catheter which patient is refusing.    Anemia  Secondary to acute on chronic GI blood loss.  Will transfuse another unit of packed red blood cells now.  Anemia is likely due to chronic blood  loss. Most recent hemoglobin and hematocrit are listed below.  Recent Labs     09/02/24  1535 09/03/24  0301   HGB 8.1* 6.2*   HCT 24.3* 19.0*       Plan  - Monitor serial CBC: Daily now.  Check hemoglobin/hematocrit q.8 hours.  - Transfuse PRBC if patient becomes hemodynamically unstable, symptomatic or H/H drops below 7/21.  - Patient has not received any PRBC transfusions to date  - Patient's anemia is currently stable  - follow up repeat CBC, iron panel, B12, folic acid   Continue folic acid supplement    Discussed with patient's mother at bedside, answered all questions.  Discussed with  regarding discharge planning.  VTE Risk Mitigation (From admission, onward)           Ordered     Place sequential compression device  Until discontinued         09/02/24 2045     IP VTE LOW RISK PATIENT  Once         09/02/24 2045                    Discharge Planning   ED: 9/9/2024     Code Status: Full Code   Is the patient medically ready for discharge?:     Reason for patient still in hospital (select all that apply): Patient trending condition and Consult recommendations  Discharge Plan A: Home with family            Critical care time spent on the evaluation and treatment of severe organ dysfunction, review of pertinent labs and imaging studies, discussions with consulting providers and discussions with patient/family: 35 minutes.      Horacio Hurtado MD  Department of Hospital Medicine   [facility]

## 2024-09-04 NOTE — ASSESSMENT & PLAN NOTE
Reportedly had colonoscopy at [facility], need operative notes   There was banding done, patient unable to elaborate  Discussed with Dr. Mistry.  Reviewed flexible sigmoidoscopy results.  CT scan did show diverticulosis  Follow CBC and transfuse as needed.  Follow GI recommendations.  Resume diet once mental status improves.

## 2024-09-04 NOTE — CARE UPDATE
09/03/24 2134   Patient Assessment/Suction   Level of Consciousness (AVPU) responds to voice   Respiratory Effort Normal;Unlabored   Expansion/Accessory Muscles/Retractions no use of accessory muscles;no retractions   All Lung Fields Breath Sounds clear   Rhythm/Pattern, Respiratory unlabored;pattern regular   Cough Frequency no cough   PRE-TX-O2   Device (Oxygen Therapy) room air   SpO2 100 %   Pulse Oximetry Type Continuous   Pulse 63   Resp (!) 22

## 2024-09-04 NOTE — RESPIRATORY THERAPY
09/04/24 0809   Patient Assessment/Suction   Level of Consciousness (AVPU) responds to voice   Respiratory Effort Normal;Unlabored   Expansion/Accessory Muscles/Retractions expansion symmetric;no retractions;no use of accessory muscles   Rhythm/Pattern, Respiratory unlabored;pattern regular;depth regular;no shortness of breath reported   Cough Frequency no cough   PRE-TX-O2   Device (Oxygen Therapy) room air   SpO2 99 %   Pulse Oximetry Type Continuous   $ Pulse Oximetry - Multiple Charge Pulse Oximetry - Multiple   Pulse (!) 59   Resp 13

## 2024-09-04 NOTE — ASSESSMENT & PLAN NOTE
Tele monitoring, neuro checks.  Maintain patient on lactulose to ensure 3-4 bowel movements daily.  Lactulose compliance discussed with patient and his mother.  Patient agreeable to receive lactulose.  Observe fall precautions.  Follow GI specialist recommendations.  Patient may require transfer to [facility] hepatology Department for further management.  Alcohol abstinence counseling where be performed later once patient is appropriate.

## 2024-09-04 NOTE — PLAN OF CARE
SW attempted to call PCP's office to schedule a hospital f/u. PCP did not answer. SW will try again later.

## 2024-09-04 NOTE — ASSESSMENT & PLAN NOTE
Patient with known Cirrhosis with Child's class Calculator for Child's score link- https://www.[employer].com/calc/340/child-redman-score-cirrhosis-mortality#creator-insights. Co-morbidities are present and inclusive of ascites and anemia/pancytopenia.  MELD-Na score calculated; MELD 3.0: 38 at 9/4/2024  2:38 AM  MELD-Na: 37 at 9/4/2024  2:38 AM  Calculated from:  Serum Creatinine: 3.5 mg/dL (Using max of 3 mg/dL) at 9/4/2024  2:38 AM  Serum Sodium: 136 mmol/L at 9/4/2024  2:38 AM  Total Bilirubin: 20.6 mg/dL at 9/4/2024  2:38 AM  Serum Albumin: 3.0 g/dL at 9/4/2024  2:38 AM  INR(ratio): 1.9 at 9/4/2024  2:38 AM  Age at listing (hypothetical): 44 years  Sex: Male at 9/4/2024  2:38 AM      Continue chronic meds. Etiology likely ETOH. Will avoid any hepatotoxic meds, and monitor CBC/CMP/INR for synthetic function.     Patient ideally needs follow up with hepatology outpatient   Gastroenterology consulted   Started the patient on midodrine, octreotide, albumin   Follow up repeat CMP  Continue lactulose.  Importance of regular use of lactulose discussed with patient and his mother.  Patient agreeable to receive lactulose.  Started on prednisone outpatient, we will continue for alcoholic hepatitis. Could be cause of elevated WBC?  Patient underwent abdominal paracentesis by Interventional Radiology with 5 L clear straw ascitic fluid on September 3, 2024.  Continue intravenous Rocephin daily

## 2024-09-04 NOTE — PLAN OF CARE
Relieved previous am nurse and assumed care. Patient found lying in bed, in no distress, awakes to voice easily. Orientated to place, but doesn't know the year (said 2002), doesn't know why he is here, has a vague understanding that he is in a hospital. But forgetful. However, following commands, recognizes and is speaking with his sister and mother at bedside.    Voiding concentrated terrie urine. I was able to get him to void in the urinal for me twice so far but generally had just been not cooperative and voiding per toilet. He had had a bm earlier this morning. I educated him and family on what we're doing for him. He had refused for me to give him this evening lactulose. I dicussed with md and he was able to convince him to take it. Since then he had had x2 more loose brown Bms per toilet. Would advise to monitor and possibly hold night dose.     Eating pretty well at least 50% of lunch and dinner.    The belly is round and somewhat tight. It is not painful. There are active bowel sounds. I appreciate a heart murmur. He is saturating well on room air and no c/o shortness of breath. The BP has been a bit on the higher side and I have held this dose of midodrine. He has some general edema mostly in the bilat legs about +2. I have elevated them and applied scds.    Some mild tremulousness but this has remained stable. He has a poor fund of knowledge of his medical condition. I have attempted to educate him on several occasions.    He is quick to irritation and anger.     There is a significant bruise    Request sent to [facility] for records    Mepilex to bilat heels. He is able to reposition independently in the bed and I have encouraged him to do so - supervised. The bed is low and alarm on. Clinical alarms reviewed and armed. Monitor strip printed and reviewed, sinus rhythm. I have encouraged pulmonary hygiene.  I had him sit up in the chair for a while but he basically just wants to lie down I couldn't  encourage him to ambulate or anything. He ambulates pretty well on the bathroom standby assist.    The right forearm iv infiltrated about FPC through the rocephin bag. I stopped it, took a picture, removed, elevated. It took a lot of convincing for him to let me put another iv he was quite irate. Finally he let me do it, 20ga extended cath right basilic ultrasound guided, aseptic technique x1 attempt patient tolerated no site complications, visualized catheter in vessel and brisk blood aspiration, chg dressing applied and labeled.    Nurses Note -- 4 Eyes      9/4/2024   1200      Skin assessed during: Daily Assessment      [] No Altered Skin Integrity Present    []Prevention Measures Documented      [x] Yes- Altered Skin Integrity Present or Discovered   [x] LDA Added if Not in Epic (Describe Wound)   [] New Altered Skin Integrity was Present on Admit and Documented in LDA   [] Wound Image Taken    Wound Care Consulted? No    Attending Nurse:  Areli Tompkins RN/Staff Member: manav hernandez

## 2024-09-05 PROBLEM — D62 ABLA (ACUTE BLOOD LOSS ANEMIA): Status: ACTIVE | Noted: 2024-09-02

## 2024-09-05 PROBLEM — N17.9 AKI (ACUTE KIDNEY INJURY): Status: ACTIVE | Noted: 2024-09-05

## 2024-09-05 LAB
ALBUMIN SERPL BCP-MCNC: 3.1 G/DL (ref 3.5–5.2)
ALP SERPL-CCNC: 153 U/L (ref 55–135)
ALT SERPL W/O P-5'-P-CCNC: 49 U/L (ref 10–44)
ANION GAP SERPL CALC-SCNC: 9 MMOL/L (ref 8–16)
ANISOCYTOSIS BLD QL SMEAR: ABNORMAL
ANISOCYTOSIS BLD QL SMEAR: SLIGHT
AST SERPL-CCNC: 69 U/L (ref 10–40)
BACTERIA SPEC ANAEROBE CULT: NORMAL
BASO STIPL BLD QL SMEAR: ABNORMAL
BASOPHILS # BLD AUTO: 0.04 K/UL (ref 0–0.2)
BASOPHILS # BLD AUTO: 0.07 K/UL (ref 0–0.2)
BASOPHILS NFR BLD: 0.1 % (ref 0–1.9)
BASOPHILS NFR BLD: 0.2 % (ref 0–1.9)
BILIRUB SERPL-MCNC: 23.1 MG/DL (ref 0.1–1)
BUN SERPL-MCNC: 49 MG/DL (ref 6–20)
BURR CELLS BLD QL SMEAR: ABNORMAL
CALCIUM SERPL-MCNC: 8.8 MG/DL (ref 8.7–10.5)
CHLORIDE SERPL-SCNC: 105 MMOL/L (ref 95–110)
CO2 SERPL-SCNC: 23 MMOL/L (ref 23–29)
CREAT SERPL-MCNC: 3.1 MG/DL (ref 0.5–1.4)
DIFFERENTIAL METHOD BLD: ABNORMAL
DIFFERENTIAL METHOD BLD: ABNORMAL
EOSINOPHIL # BLD AUTO: 0 K/UL (ref 0–0.5)
EOSINOPHIL # BLD AUTO: 0.1 K/UL (ref 0–0.5)
EOSINOPHIL NFR BLD: 0 % (ref 0–8)
EOSINOPHIL NFR BLD: 0.2 % (ref 0–8)
ERYTHROCYTE [DISTWIDTH] IN BLOOD BY AUTOMATED COUNT: 21.3 % (ref 11.5–14.5)
ERYTHROCYTE [DISTWIDTH] IN BLOOD BY AUTOMATED COUNT: 21.5 % (ref 11.5–14.5)
EST. GFR  (NO RACE VARIABLE): 24.5 ML/MIN/1.73 M^2
GLUCOSE SERPL-MCNC: 161 MG/DL (ref 70–110)
HCT VFR BLD AUTO: 27.5 % (ref 40–54)
HCT VFR BLD AUTO: 30.1 % (ref 40–54)
HGB BLD-MCNC: 10.1 G/DL (ref 14–18)
HGB BLD-MCNC: 9.2 G/DL (ref 14–18)
HYPOCHROMIA BLD QL SMEAR: ABNORMAL
IMM GRANULOCYTES # BLD AUTO: 0.33 K/UL (ref 0–0.04)
IMM GRANULOCYTES # BLD AUTO: 0.56 K/UL (ref 0–0.04)
IMM GRANULOCYTES NFR BLD AUTO: 0.9 % (ref 0–0.5)
IMM GRANULOCYTES NFR BLD AUTO: 1.3 % (ref 0–0.5)
LYMPHOCYTES # BLD AUTO: 0.4 K/UL (ref 1–4.8)
LYMPHOCYTES # BLD AUTO: 0.9 K/UL (ref 1–4.8)
LYMPHOCYTES NFR BLD: 1.2 % (ref 18–48)
LYMPHOCYTES NFR BLD: 2.1 % (ref 18–48)
MCH RBC QN AUTO: 31.5 PG (ref 27–31)
MCH RBC QN AUTO: 31.6 PG (ref 27–31)
MCHC RBC AUTO-ENTMCNC: 33.5 G/DL (ref 32–36)
MCHC RBC AUTO-ENTMCNC: 33.6 G/DL (ref 32–36)
MCV RBC AUTO: 94 FL (ref 82–98)
MCV RBC AUTO: 94 FL (ref 82–98)
MONOCYTES # BLD AUTO: 1.1 K/UL (ref 0.3–1)
MONOCYTES # BLD AUTO: 1.5 K/UL (ref 0.3–1)
MONOCYTES NFR BLD: 3.1 % (ref 4–15)
MONOCYTES NFR BLD: 3.7 % (ref 4–15)
NEUTROPHILS # BLD AUTO: 34.4 K/UL (ref 1.8–7.7)
NEUTROPHILS # BLD AUTO: 38.8 K/UL (ref 1.8–7.7)
NEUTROPHILS NFR BLD: 92.5 % (ref 38–73)
NEUTROPHILS NFR BLD: 94.7 % (ref 38–73)
NRBC BLD-RTO: 0 /100 WBC
NRBC BLD-RTO: 0 /100 WBC
OVALOCYTES BLD QL SMEAR: ABNORMAL
PHOSPHATE SERPL-MCNC: 2.7 MG/DL (ref 2.7–4.5)
PLATELET # BLD AUTO: 106 K/UL (ref 150–450)
PLATELET # BLD AUTO: 106 K/UL (ref 150–450)
PLATELET BLD QL SMEAR: ABNORMAL
PLATELET BLD QL SMEAR: ABNORMAL
PMV BLD AUTO: 11.5 FL (ref 9.2–12.9)
PMV BLD AUTO: 11.9 FL (ref 9.2–12.9)
POIKILOCYTOSIS BLD QL SMEAR: SLIGHT
POIKILOCYTOSIS BLD QL SMEAR: SLIGHT
POTASSIUM SERPL-SCNC: 4 MMOL/L (ref 3.5–5.1)
PROT SERPL-MCNC: 4.8 G/DL (ref 6–8.4)
PTH-INTACT SERPL-MCNC: 91.3 PG/ML (ref 9–77)
RBC # BLD AUTO: 2.92 M/UL (ref 4.6–6.2)
RBC # BLD AUTO: 3.2 M/UL (ref 4.6–6.2)
SCHISTOCYTES BLD QL SMEAR: ABNORMAL
SCHISTOCYTES BLD QL SMEAR: PRESENT
SODIUM SERPL-SCNC: 137 MMOL/L (ref 136–145)
SPHEROCYTES BLD QL SMEAR: ABNORMAL
TARGETS BLD QL SMEAR: ABNORMAL
WBC # BLD AUTO: 36.31 K/UL (ref 3.9–12.7)
WBC # BLD AUTO: 41.95 K/UL (ref 3.9–12.7)

## 2024-09-05 PROCEDURE — 84100 ASSAY OF PHOSPHORUS: CPT | Performed by: STUDENT IN AN ORGANIZED HEALTH CARE EDUCATION/TRAINING PROGRAM

## 2024-09-05 PROCEDURE — 36415 COLL VENOUS BLD VENIPUNCTURE: CPT | Performed by: STUDENT IN AN ORGANIZED HEALTH CARE EDUCATION/TRAINING PROGRAM

## 2024-09-05 PROCEDURE — 83970 ASSAY OF PARATHORMONE: CPT | Performed by: INTERNAL MEDICINE

## 2024-09-05 PROCEDURE — P9047 ALBUMIN (HUMAN), 25%, 50ML: HCPCS | Mod: JZ,JG | Performed by: STUDENT IN AN ORGANIZED HEALTH CARE EDUCATION/TRAINING PROGRAM

## 2024-09-05 PROCEDURE — 25000003 PHARM REV CODE 250: Performed by: HOSPITALIST

## 2024-09-05 PROCEDURE — 12000002 HC ACUTE/MED SURGE SEMI-PRIVATE ROOM

## 2024-09-05 PROCEDURE — 63600175 PHARM REV CODE 636 W HCPCS: Performed by: STUDENT IN AN ORGANIZED HEALTH CARE EDUCATION/TRAINING PROGRAM

## 2024-09-05 PROCEDURE — 36415 COLL VENOUS BLD VENIPUNCTURE: CPT | Performed by: INTERNAL MEDICINE

## 2024-09-05 PROCEDURE — 80053 COMPREHEN METABOLIC PANEL: CPT | Performed by: STUDENT IN AN ORGANIZED HEALTH CARE EDUCATION/TRAINING PROGRAM

## 2024-09-05 PROCEDURE — 25000003 PHARM REV CODE 250: Performed by: STUDENT IN AN ORGANIZED HEALTH CARE EDUCATION/TRAINING PROGRAM

## 2024-09-05 PROCEDURE — 25000003 PHARM REV CODE 250: Performed by: INTERNAL MEDICINE

## 2024-09-05 PROCEDURE — 63600175 PHARM REV CODE 636 W HCPCS: Mod: JA | Performed by: INTERNAL MEDICINE

## 2024-09-05 PROCEDURE — 94761 N-INVAS EAR/PLS OXIMETRY MLT: CPT

## 2024-09-05 PROCEDURE — 85025 COMPLETE CBC W/AUTO DIFF WBC: CPT | Performed by: INTERNAL MEDICINE

## 2024-09-05 PROCEDURE — 63600175 PHARM REV CODE 636 W HCPCS: Mod: JZ,JG | Performed by: STUDENT IN AN ORGANIZED HEALTH CARE EDUCATION/TRAINING PROGRAM

## 2024-09-05 RX ORDER — ALPRAZOLAM 0.5 MG/1
0.5 TABLET ORAL 3 TIMES DAILY PRN
Status: DISCONTINUED | OUTPATIENT
Start: 2024-09-05 | End: 2024-09-10 | Stop reason: HOSPADM

## 2024-09-05 RX ORDER — CIPROFLOXACIN 500 MG/1
500 TABLET ORAL EVERY 12 HOURS
Status: COMPLETED | OUTPATIENT
Start: 2024-09-05 | End: 2024-09-09

## 2024-09-05 RX ORDER — LEVETIRACETAM 500 MG/1
500 TABLET ORAL 2 TIMES DAILY
Status: DISCONTINUED | OUTPATIENT
Start: 2024-09-05 | End: 2024-09-10 | Stop reason: HOSPADM

## 2024-09-05 RX ADMIN — PREDNISONE 40 MG: 20 TABLET ORAL at 10:09

## 2024-09-05 RX ADMIN — PANTOPRAZOLE SODIUM 40 MG: 40 INJECTION, POWDER, FOR SOLUTION INTRAVENOUS at 10:09

## 2024-09-05 RX ADMIN — ALBUMIN (HUMAN) 25 G: 0.25 INJECTION, SOLUTION INTRAVENOUS at 08:09

## 2024-09-05 RX ADMIN — SODIUM BICARBONATE 650 MG TABLET 650 MG: at 08:09

## 2024-09-05 RX ADMIN — OCTREOTIDE ACETATE 50 MCG/HR: 500 INJECTION, SOLUTION INTRAVENOUS; SUBCUTANEOUS at 01:09

## 2024-09-05 RX ADMIN — LEVETIRACETAM 500 MG: 500 TABLET, FILM COATED ORAL at 08:09

## 2024-09-05 RX ADMIN — LEVETIRACETAM INJECTION 500 MG: 5 INJECTION INTRAVENOUS at 10:09

## 2024-09-05 RX ADMIN — CEFTRIAXONE SODIUM 2 G: 2 INJECTION, POWDER, FOR SOLUTION INTRAMUSCULAR; INTRAVENOUS at 05:09

## 2024-09-05 RX ADMIN — CIPROFLOXACIN HYDROCHLORIDE 500 MG: 500 TABLET, FILM COATED ORAL at 08:09

## 2024-09-05 RX ADMIN — MUPIROCIN 1 G: 20 OINTMENT TOPICAL at 08:09

## 2024-09-05 RX ADMIN — SODIUM BICARBONATE 650 MG TABLET 650 MG: at 04:09

## 2024-09-05 RX ADMIN — THIAMINE HCL TAB 100 MG 100 MG: 100 TAB at 10:09

## 2024-09-05 RX ADMIN — MIDODRINE HYDROCHLORIDE 10 MG: 10 TABLET ORAL at 05:09

## 2024-09-05 RX ADMIN — ALBUMIN (HUMAN) 25 G: 0.25 INJECTION, SOLUTION INTRAVENOUS at 10:09

## 2024-09-05 RX ADMIN — FOLIC ACID 1 MG: 1 TABLET ORAL at 10:09

## 2024-09-05 RX ADMIN — SODIUM BICARBONATE 650 MG TABLET 650 MG: at 10:09

## 2024-09-05 RX ADMIN — LACTULOSE 20 G: 20 SOLUTION ORAL at 08:09

## 2024-09-05 RX ADMIN — MIDODRINE HYDROCHLORIDE 10 MG: 10 TABLET ORAL at 08:09

## 2024-09-05 NOTE — CARE UPDATE
09/05/24 0719   Patient Assessment/Suction   Level of Consciousness (AVPU) alert   Respiratory Effort Normal;Unlabored   Expansion/Accessory Muscles/Retractions no use of accessory muscles;no retractions;expansion symmetric   All Lung Fields Breath Sounds clear   PRE-TX-O2   Device (Oxygen Therapy) room air   SpO2 100 %   Pulse Oximetry Type Intermittent   $ Pulse Oximetry - Multiple Charge Pulse Oximetry - Multiple

## 2024-09-05 NOTE — ASSESSMENT & PLAN NOTE
REMI is likely due to  hepatorenal syndrome . Baseline creatinine is unknown. Most recent creatinine and eGFR are listed below.  Recent Labs     09/03/24  0301 09/04/24  0238 09/05/24  0841   CREATININE 3.9* 3.5* 3.1*   EGFRNORACEVR 18.6* 21.2* 24.5*      Plan  - REMI is worsening. Will adjust treatment as follows: Nephrology starting specific treatment for HRS, including Sandostatin and albumin  - Avoid nephrotoxins and renally dose meds for GFR listed above  - Monitor urine output, serial BMP, and adjust therapy as needed

## 2024-09-05 NOTE — SUBJECTIVE & OBJECTIVE
Interval History:  Creatinine continues to go up.  Patient has no new complaints.  Analysis of ascites shows no infection.  HGB level stable.    Review of Systems   Respiratory:  Negative for shortness of breath.    Cardiovascular:  Negative for chest pain.     Objective:     Vital Signs (Most Recent):  Temp: 97.5 °F (36.4 °C) (09/05/24 0715)  Pulse: 69 (09/05/24 0715)  Resp: 12 (09/05/24 0715)  BP: 119/79 (09/05/24 0715)  SpO2: 100 % (09/05/24 0719) Vital Signs (24h Range):  Temp:  [97.4 °F (36.3 °C)-97.8 °F (36.6 °C)] 97.5 °F (36.4 °C)  Pulse:  [63-89] 69  Resp:  [12-31] 12  SpO2:  [98 %-100 %] 100 %  BP: (119-132)/(79-89) 119/79     Weight: 81.4 kg (179 lb 7.3 oz)  Body mass index is 23.68 kg/m².    Intake/Output Summary (Last 24 hours) at 9/5/2024 1354  Last data filed at 9/5/2024 1120  Gross per 24 hour   Intake 1430 ml   Output 700 ml   Net 730 ml         Physical Exam  Vitals reviewed.   Constitutional:       General: He is not in acute distress.     Appearance: He is ill-appearing. He is not diaphoretic.   HENT:      Mouth/Throat:      Mouth: Mucous membranes are moist.   Eyes:      General: No scleral icterus.        Right eye: No discharge.         Left eye: No discharge.   Neck:      Vascular: No JVD.   Cardiovascular:      Rate and Rhythm: Normal rate and regular rhythm.   Pulmonary:      Effort: Pulmonary effort is normal.      Breath sounds: Normal breath sounds.   Abdominal:      General: Bowel sounds are normal. There is distension.      Palpations: Abdomen is soft.      Tenderness: There is no abdominal tenderness.   Musculoskeletal:      Right lower leg: Pitting Edema present.      Left lower leg: Pitting Edema present.   Skin:     General: Skin is warm.      Findings: No rash.   Neurological:      Mental Status: He is alert.   Psychiatric:         Mood and Affect: Affect is flat.         Speech: Speech is delayed.         Behavior: Behavior is slowed and withdrawn.             Significant Labs:  All pertinent labs within the past 24 hours have been reviewed.    Significant Imaging:  No new imaging

## 2024-09-05 NOTE — ASSESSMENT & PLAN NOTE
MELD-Na score calculated; MELD 3.0: 38 at 9/5/2024  8:41 AM  MELD-Na: 36 at 9/5/2024  8:41 AM  Calculated from:  Serum Creatinine: 3.1 mg/dL (Using max of 3 mg/dL) at 9/5/2024  8:41 AM  Serum Sodium: 137 mmol/L at 9/5/2024  8:41 AM  Total Bilirubin: 23.1 mg/dL at 9/5/2024  8:41 AM  Serum Albumin: 3.1 g/dL at 9/5/2024  8:41 AM  INR(ratio): 1.9 at 9/4/2024  2:38 AM  Age at listing (hypothetical): 44 years  Sex: Male at 9/5/2024  8:41 AM    Continue chronic meds. Etiology likely ETOH. Will avoid any hepatotoxic meds, and monitor CBC/CMP/INR for synthetic function.     Patient ideally needs follow up with hepatology outpatient   Gastroenterology consulted   Continue lactulose.  Importance of regular use of lactulose discussed with patient and his mother.  Patient agreeable to receive lactulose.  Started on prednisone outpatient, we will continue for alcoholic hepatitis. Could be cause of elevated WBC?  Patient underwent abdominal paracentesis by Interventional Radiology with 5 L clear straw ascitic fluid on September 3, 2024.  Continue SBP prophylaxis.  Change Rocephin to oral ciprofloxacin.

## 2024-09-05 NOTE — ASSESSMENT & PLAN NOTE
Secondary to acute on chronic GI blood loss.  Anemia is likely due to acute blood loss. Most recent hemoglobin and hematocrit are listed below.  Recent Labs     09/04/24  0238 09/04/24  2241 09/05/24  0841   HGB 8.4* 9.2* 10.1*   HCT 24.5* 27.5* 30.1*       Plan  - Monitor CBC daily.  - Transfuse PRBC if patient becomes hemodynamically unstable, symptomatic or H/H drops below 7/21.  - Patient has been transfused.  - Patient's anemia is currently improving.

## 2024-09-05 NOTE — PLAN OF CARE
TCC with PCP 9/13/24 0920  Referral for amb hepatology outpt order placed per discussion with MD     09/05/24 1512   Post-Acute Status   Hospital Resources/Appts/Education Provided Appointments scheduled and added to AVS

## 2024-09-05 NOTE — NURSING
Nurses Note -- 4 Eyes      9/5/2024   6:49 PM      Skin assessed during: Transfer      [] No Altered Skin Integrity Present    []Prevention Measures Documented      [x] Yes- Altered Skin Integrity Present or Discovered   [] LDA Added if Not in Epic (Describe Wound)   [] New Altered Skin Integrity was Present on Admit and Documented in LDA   [x] Wound Image Taken    Bruising to bilateral arms    Wound Care Consulted? No    Attending Nurse:  Mark Anthony Tompkins RN/Staff Member:  Vanda

## 2024-09-05 NOTE — ASSESSMENT & PLAN NOTE
GI consultant performed flexible sigmoidoscopy.  Ulcer noted with slight bleeding, located just above dentate line.  No endoscopic hemostatic intervention performed.  Monitor hemoglobin level.  Patient is still having small amount of blood in stool.

## 2024-09-05 NOTE — PLAN OF CARE
Problem: Adult Inpatient Plan of Care  Goal: Plan of Care Review  Outcome: Progressing     Problem: Adult Inpatient Plan of Care  Goal: Patient-Specific Goal (Individualized)  Outcome: Progressing     Problem: Adult Inpatient Plan of Care  Goal: Absence of Hospital-Acquired Illness or Injury  Outcome: Progressing     Problem: Adult Inpatient Plan of Care  Goal: Optimal Comfort and Wellbeing  Outcome: Progressing     Problem: Adult Inpatient Plan of Care  Goal: Readiness for Transition of Care  Outcome: Progressing     Problem: Gastrointestinal Bleeding  Goal: Optimal Coping with Acute Illness  Outcome: Progressing     Problem: Gastrointestinal Bleeding  Goal: Hemostasis  Outcome: Progressing     Problem: Fall Injury Risk  Goal: Absence of Fall and Fall-Related Injury  Outcome: Progressing     Problem: Infection  Goal: Absence of Infection Signs and Symptoms  Outcome: Progressing     Problem: Coping Ineffective  Goal: Effective Coping  Outcome: Progressing     Problem: Acute Kidney Injury/Impairment  Goal: Fluid and Electrolyte Balance  Outcome: Progressing     Problem: Acute Kidney Injury/Impairment  Goal: Improved Oral Intake  Outcome: Progressing     Problem: Acute Kidney Injury/Impairment  Goal: Effective Renal Function  Outcome: Progressing

## 2024-09-05 NOTE — ASSESSMENT & PLAN NOTE
Maintain patient on lactulose to ensure 3-4 bowel movements daily.  Monitor cognitive functions and level of alertness.

## 2024-09-05 NOTE — NURSING
Patient still refusing most medications. Patient complained that right basilic IV was hurting during infusion of KVO saline. Stopped infusion, spoke with Deonte, IV nurse specialist, who viewed both of patient's Ivs while flushing them. Right basilic IV did appear to be infusing outside of vessel. Line removed. Right wrist IV still infusing as it is supposed to. Patient refused to have another IV line placed.    Mother at bedside attempts to get patient to be more agreeable to medications and procedures. Occasionally he will agree with her, but often he refuses. Patient vocalizes an understanding of their condition and the seriousness of the situation, but ultimately voices a disdain for being in the hospital and simply hopes to be discharged.

## 2024-09-05 NOTE — PROGRESS NOTES
"INPATIENT NEPHROLOGY Progress Note  [facility] NEPHROLOGY    Washington Glasgow  09/05/2024    Reason for consultation:    Acute kidney injury    Chief Complaint:   Chief Complaint   Patient presents with    Jaundice    Rectal Bleeding          History of Present Illness:    Per H and P  Patient is a 44-year-old male with history of alcoholic liver cirrhosis, last alcoholic drink a month ago, lower GI bleed, anemia, seizure disorder, chronic kidney disease presents to the emergency room with complaints of abdominal distention and rectal bleeding. Patient was recently at [facility] on 08/28 for GI bleed at that time, received blood transfusion. Underwent a colonoscopy, patient and significant other unsure if anything was found. They report that the cause of the rectal bleeding is still unknown, reports that "banding" was done. Patient left AMA. Significant other reports there were discussions about undergoing tips procedure, this is still pending. Patient follows with Gastroenterology outpatient. Patient reports ongoing on and off rectal bleeding, and worsening abdominal distention-primary reason he came here. Patient denies fever, chills, nausea, vomiting, chest pain, shortness of breath. Review of labs on 08/29 showed WBC 31, hemoglobin 6.9, creatinine 4.97, bilirubin 21. On this admission WBC 36.13, hemoglobin 8.1, creatinine 4, bilirubin 22.9. Patient reports also following with Nephrology outpatient. Patient transferred from [facility] for paracentesis     9/4  AFVSS, output not measured due to pt refusing to go in the urinal, no cp, sob, nausea.  More alert today.  Refused fernandez placement.  Colonoscopy limited due to stool.  1cm ulcer above dentate line seen.    9/5 refusing lactulose, with AMS, s/p 5L para neg for SBP, transfused 2u of blood, VSS, on RA, UOP 600cc +  voids- WBC rising, Hb better, renal function slightly better    Plan of Care:     REMI- concern for HRS  Possible underlying CKD  ETOH " cirrhosis with ascites and HE  Hyponatremia  Acidosis  Acute GIB    - SCr is improving- continue treatment for HRS- switch octreotide SQ to gtt- no nsaids or IV contrast- dose meds for CrCl < 30 - no acute RRT needs  - imaging unrevealing- check phos, PTH to evaluate for underlying CKD-  UA trace protein, no blood- UPCR 0.4   - s/p para- 5L- still distended- may need another in a few days- no SBP- refusing lactulose- will inquire why on prednisone  - add 1.5L fluid restriction  - add sod bicarb 650mg BID  - trend H/H and GI benjie    Thank you for allowing us to participate in this patient's care. We will continue to follow.    Vital Signs:  Temp Readings from Last 3 Encounters:   09/05/24 97.8 °F (36.6 °C) (Oral)       Pulse Readings from Last 3 Encounters:   09/05/24 89   09/03/24 66       BP Readings from Last 3 Encounters:   09/05/24 127/89   09/03/24 109/75       Weight:  Wt Readings from Last 3 Encounters:   09/04/24 81.4 kg (179 lb 7.3 oz)   Medications:  No current facility-administered medications on file prior to encounter.     Current Outpatient Medications on File Prior to Encounter   Medication Sig Dispense Refill    folic acid (FOLVITE) 1 MG tablet Take 1,000 mcg by mouth once daily.      levETIRAcetam (KEPPRA) 500 MG Tab Take 500 mg by mouth 2 (two) times daily.      predniSONE (DELTASONE) 20 MG tablet Take 40 mg by mouth once daily.      PROTONIX 40 mg tablet Take 40 mg by mouth 2 (two) times daily.      sodium bicarbonate 650 MG tablet Take 650 mg by mouth 3 (three) times daily.      thiamine mononitrate, vit B1, (VITAMIN B-1, MONONITRATE,) 100 mg Tab Take 100 mg by mouth once daily.       Scheduled Meds:   albumin human 25%  25 g Intravenous BID    cefTRIAXone (Rocephin) IV (PEDS and ADULTS)  2 g Intravenous Q12H    folic acid  1 mg Oral Daily    lactulose  20 g Oral TID    levETIRAcetam (Keppra) IV (PEDS and ADULTS)  500 mg Intravenous Q12H    midodrine  10 mg Oral Q8H    mupirocin   Nasal BID     "octreotide  100 mcg Subcutaneous Q8H    pantoprazole  40 mg Intravenous BID    predniSONE  40 mg Oral Daily    sodium bicarbonate  650 mg Oral TID    thiamine  100 mg Oral Daily     Continuous Infusions:  PRN Meds:.  Current Facility-Administered Medications:     0.9%  NaCl infusion (for blood administration), , Intravenous, Q24H PRN    0.9%  NaCl infusion (for blood administration), , Intravenous, Q24H PRN    magnesium oxide, 800 mg, Oral, PRN    magnesium oxide, 800 mg, Oral, PRN    ondansetron, 4 mg, Intravenous, Q6H PRN    oxyCODONE, 5 mg, Oral, Q4H PRN    potassium bicarbonate, 35 mEq, Oral, PRN    potassium bicarbonate, 50 mEq, Oral, PRN    potassium bicarbonate, 60 mEq, Oral, PRN    potassium, sodium phosphates, 2 packet, Oral, PRN    potassium, sodium phosphates, 2 packet, Oral, PRN    potassium, sodium phosphates, 2 packet, Oral, PRN    Review of Systems:  Neg    Physical Exam:    /89   Pulse 89   Temp 97.8 °F (36.6 °C) (Oral)   Resp 18   Ht 6' 1" (1.854 m)   Wt 81.4 kg (179 lb 7.3 oz)   SpO2 100%   BMI 23.68 kg/m²     General Appearance:    Ill appearing   Head:    Normocephalic, without obvious abnormality, atraumatic   Eyes:    PER, conjunctiva/corneas clear, EOM's intact in both eyes        Throat:   Lips, mucosa, and tongue normal; teeth and gums normal   Back:     Symmetric, no curvature, ROM normal, no CVA tenderness   Lungs:     Clear to auscultation bilaterally, respirations unlabored   Chest wall:    No tenderness or deformity   Heart:    Regular rate and rhythm, S1 and S2 normal, no murmur, rub   or gallop   Abdomen:     Mild tenderness, very distended with ascites   Extremities:   Extremities normal, atraumatic, no cyanosis or edema   Pulses:   2+ and symmetric all extremities   MSK:   No joint or muscle swelling, tenderness or deformity   Skin:   Skin color, texture, turgor normal, no rashes or lesions   Neurologic:   No flap, poor insight     Results:  Lab Results   Component " Value Date     09/04/2024    K 4.4 09/04/2024     09/04/2024    CO2 20 (L) 09/04/2024    BUN 59 (H) 09/04/2024    CREATININE 3.5 (H) 09/04/2024    CALCIUM 8.0 (L) 09/04/2024    ANIONGAP 11 09/04/2024    ESTGFRAFRICA >60 01/17/2022    EGFRNONAA >60 01/17/2022       Lab Results   Component Value Date    CALCIUM 8.0 (L) 09/04/2024       Recent Labs   Lab 09/05/24  0841   WBC 41.95*   RBC 3.20*   HGB 10.1*   HCT 30.1*   *   MCV 94   MCH 31.6*   MCHC 33.6        Imaging Results              US Abdomen Limited (Final result)  Result time 09/02/24 16:47:38   Procedure changed from US Abdomen Complete     Final result by Reva Méndez MD (09/02/24 16:47:38)                   Impression:      As above      Electronically signed by: Reva Méndez MD  Date:    09/02/2024  Time:    16:47               Narrative:    EXAMINATION:  US ABDOMEN LIMITED    CLINICAL HISTORY:  ascites; Other ascites    TECHNIQUE:  Limited ultrasound of the abdomen for ascites    COMPARISON:  None.    FINDINGS:  There is ascites seen in all 4 quadrants with moderately large amount ascites seen.  .                                       CT Abdomen Pelvis  Without Contrast (Final result)  Result time 09/02/24 16:44:36      Final result by Reva Méndez MD (09/02/24 16:44:36)                   Impression:      Features of cirrhosis with ascites and nodular contour of the liver.  Mild diffuse bladder wall thickening versus incomplete distention recommend correlation clinically if clinical consideration for cystitis or chronic bladder outlet obstruction.  Diverticulosis without CT findings of acute diverticulitis.  Findings detailed above.      Electronically signed by: Reva Méndez MD  Date:    09/02/2024  Time:    16:44               Narrative:    EXAMINATION:  CT ABDOMEN PELVIS WITHOUT CONTRAST    CLINICAL HISTORY:  abdominal distention;    TECHNIQUE:  Low dose axial images, sagittal and coronal reformations were obtained  from the lung bases to the pubic symphysis.  No p.o. contrast    COMPARISON:  None    FINDINGS:  Mild dependent hypoventilatory changes in visualized lung bases    Large volume of ascites.  Nodular contour the liver suggesting cirrhosis.  Suggestion of recanalization of the umbilical vein.  Spleen not enlarged. Splenule noted. Adrenal glands; slight diffuse thickening.    Pancreas unremarkable    Abdominal aorta no aneurysm    No calcified stones the gallbladder or CT findings of acute cholecystitis or biliary duct dilatation    Diverticulosis without CT findings of acute diverticulitis. Appendix; normal appearing appendix is thought to be visualized.    Kidney; no hydronephrosis or opaque renal stone.  No ureteral dilatation, opaque ureteral stone or ureteral obstruction evident.  The urinary bladder is mildly distended at time of the exam. Wall appears mildly diffusely thickened recommend correlation clinically if clinical consideration for cystitis or chronic bladder outlet obstruction.  The prostate gland does not appear enlarged. There is small left inguinal hernia containing ascites and fat.    Subcutaneous edema.                                    Patient care was time spent personally by me on the following activities: >35 min  Obtaining a history  Examination of patient.  Providing medical care at the patients bedside.  Developing a treatment plan with patient or surrogate and bedside caregivers  Ordering and reviewing laboratory studies, radiographic studies, pulse oximetry.  Ordering and performing treatments and interventions.  Evaluation of patient's response to treatment.  Discussions with consultants while on the unit and immediately available to the patient.  Re-evaluation of the patient's condition.  Documentation in the medical record.       I have personally reviewed pertinent radiological imaging and reports.    Patito Perkins MD  [facility] [place]  [phone]

## 2024-09-05 NOTE — PROGRESS NOTES
"[facility] Medicine  Progress Note    Patient Name: Washington Glasgow  MRN: [id number]  Patient Class: IP- Inpatient   Admission Date: 9/2/2024  Length of Stay: 3 days  Attending Physician: Judah Mehta MD  Primary Care Provider: Monika, Primary Doctor        Subjective:     Principal Problem:Alcoholic cirrhosis of liver with ascites        HPI:  Patient is a 44-year-old male with history of alcoholic liver cirrhosis, last alcoholic drink a month ago, lower GI bleed, anemia, seizure disorder, chronic kidney disease presents to the emergency room with complaints of abdominal distention and rectal bleeding.  Patient was recently at [facility] on 08/28 for GI bleed at that time, received blood transfusion.  Underwent a colonoscopy, patient and significant other unsure if anything was found.  They report that the cause of the rectal bleeding is still unknown, reports that "banding" was done.  Patient left AMA.  Significant other reports there were discussions about undergoing tips procedure, this is still pending.  Patient follows with Gastroenterology outpatient.  Patient reports ongoing on and off rectal bleeding, and worsening abdominal distention-primary reason he came here.  Patient denies fever, chills, nausea, vomiting, chest pain, shortness of breath.  Review of labs on 08/29 showed WBC 31, hemoglobin 6.9, creatinine 4.97, bilirubin 21.  On this admission WBC 36.13, hemoglobin 8.1, creatinine 4, bilirubin 22.9.  Patient reports also following with Nephrology outpatient.  Patient transferred from [facility] for paracentesis.  Gastroenterology and Nephrology consulted.       Overview/Hospital Course:  Patient admitted to intensive care unit.  Hemoglobin and hematocrit closely monitored.  Patient received 2 units of packed red blood cells.  Gastroenterology and nephrology team consulted.  Patient maintained on midodrine, octreotide and albumin therapy.  Patient underwent " abdominal paracentesis with 5 L ascitic straw-colored fluid removal.  Patient maintained on intravenous ceftriaxone prophylaxis.  Microbiology closely followed.  Patient maintained on lactulose.  Serum ammonia trended.  Patient maintained on prednisone therapy for alcoholic hepatitis management.  Patient was evaluated by GI specialist who performed flexible sigmoidoscopy revealing a linear ulceration at dentate line.  Patient has been noncompliant with use of lactulose and has refused Macias catheter placement during hospital stay.  Multiple conversations regarding compliance with lactulose were conducted with patient and his mother.    Interval History:  Creatinine continues to go up.  Patient has no new complaints.  Analysis of ascites shows no infection.  HGB level stable.    Review of Systems   Respiratory:  Negative for shortness of breath.    Cardiovascular:  Negative for chest pain.     Objective:     Vital Signs (Most Recent):  Temp: 97.5 °F (36.4 °C) (09/05/24 0715)  Pulse: 69 (09/05/24 0715)  Resp: 12 (09/05/24 0715)  BP: 119/79 (09/05/24 0715)  SpO2: 100 % (09/05/24 0719) Vital Signs (24h Range):  Temp:  [97.4 °F (36.3 °C)-97.8 °F (36.6 °C)] 97.5 °F (36.4 °C)  Pulse:  [63-89] 69  Resp:  [12-31] 12  SpO2:  [98 %-100 %] 100 %  BP: (119-132)/(79-89) 119/79     Weight: 81.4 kg (179 lb 7.3 oz)  Body mass index is 23.68 kg/m².    Intake/Output Summary (Last 24 hours) at 9/5/2024 1354  Last data filed at 9/5/2024 1120  Gross per 24 hour   Intake 1430 ml   Output 700 ml   Net 730 ml         Physical Exam  Vitals reviewed.   Constitutional:       General: He is not in acute distress.     Appearance: He is ill-appearing. He is not diaphoretic.   HENT:      Mouth/Throat:      Mouth: Mucous membranes are moist.   Eyes:      General: No scleral icterus.        Right eye: No discharge.         Left eye: No discharge.   Neck:      Vascular: No JVD.   Cardiovascular:      Rate and Rhythm: Normal rate and regular rhythm.    Pulmonary:      Effort: Pulmonary effort is normal.      Breath sounds: Normal breath sounds.   Abdominal:      General: Bowel sounds are normal. There is distension.      Palpations: Abdomen is soft.      Tenderness: There is no abdominal tenderness.   Musculoskeletal:      Right lower leg: Pitting Edema present.      Left lower leg: Pitting Edema present.   Skin:     General: Skin is warm.      Findings: No rash.   Neurological:      Mental Status: He is alert.   Psychiatric:         Mood and Affect: Affect is flat.         Speech: Speech is delayed.         Behavior: Behavior is slowed and withdrawn.             Significant Labs: All pertinent labs within the past 24 hours have been reviewed.    Significant Imaging:  No new imaging    Assessment/Plan:      * Alcoholic cirrhosis of liver with ascites  MELD-Na score calculated; MELD 3.0: 38 at 9/5/2024  8:41 AM  MELD-Na: 36 at 9/5/2024  8:41 AM  Calculated from:  Serum Creatinine: 3.1 mg/dL (Using max of 3 mg/dL) at 9/5/2024  8:41 AM  Serum Sodium: 137 mmol/L at 9/5/2024  8:41 AM  Total Bilirubin: 23.1 mg/dL at 9/5/2024  8:41 AM  Serum Albumin: 3.1 g/dL at 9/5/2024  8:41 AM  INR(ratio): 1.9 at 9/4/2024  2:38 AM  Age at listing (hypothetical): 44 years  Sex: Male at 9/5/2024  8:41 AM    Continue chronic meds. Etiology likely ETOH. Will avoid any hepatotoxic meds, and monitor CBC/CMP/INR for synthetic function.     Patient ideally needs follow up with hepatology outpatient   Gastroenterology consulted   Continue lactulose.  Importance of regular use of lactulose discussed with patient and his mother.  Patient agreeable to receive lactulose.  Started on prednisone outpatient, we will continue for alcoholic hepatitis. Could be cause of elevated WBC?  Patient underwent abdominal paracentesis by Interventional Radiology with 5 L clear straw ascitic fluid on September 3, 2024.  Continue SBP prophylaxis.  Change Rocephin to oral ciprofloxacin.    REMI (acute kidney  injury)  REMI is likely due to  hepatorenal syndrome . Baseline creatinine is unknown. Most recent creatinine and eGFR are listed below.  Recent Labs     09/03/24  0301 09/04/24  0238 09/05/24  0841   CREATININE 3.9* 3.5* 3.1*   EGFRNORACEVR 18.6* 21.2* 24.5*      Plan  - REMI is worsening. Will adjust treatment as follows: Nephrology starting specific treatment for HRS, including Sandostatin and albumin  - Avoid nephrotoxins and renally dose meds for GFR listed above  - Monitor urine output, serial BMP, and adjust therapy as needed    Hepatic encephalopathy  Maintain patient on lactulose to ensure 3-4 bowel movements daily.  Monitor cognitive functions and level of alertness.    Rectal bleeding  GI consultant performed flexible sigmoidoscopy.  Ulcer noted with slight bleeding, located just above dentate line.  No endoscopic hemostatic intervention performed.  Monitor hemoglobin level.  Patient is still having small amount of blood in stool.    Seizure disorder  Continue Keppra      ABLA (acute blood loss anemia)  Secondary to acute on chronic GI blood loss.  Anemia is likely due to acute blood loss. Most recent hemoglobin and hematocrit are listed below.  Recent Labs     09/04/24  0238 09/04/24  2241 09/05/24  0841   HGB 8.4* 9.2* 10.1*   HCT 24.5* 27.5* 30.1*       Plan  - Monitor CBC daily.  - Transfuse PRBC if patient becomes hemodynamically unstable, symptomatic or H/H drops below 7/21.  - Patient has been transfused.  - Patient's anemia is currently improving.      VTE Risk Mitigation (From admission, onward)           Ordered     Place sequential compression device  Until discontinued         09/02/24 2045     IP VTE LOW RISK PATIENT  Once         09/02/24 2045                    Discharge Planning   ED: 9/9/2024     Code Status: Full Code   Is the patient medically ready for discharge?:     Reason for patient still in hospital (select all that apply): Patient trending condition, Laboratory test, and  Treatment  Discharge Plan A: Home with family          Judah Mehta MD  Department of Hospital Medicine   [facility]

## 2024-09-06 PROBLEM — K70.11 ALCOHOLIC HEPATITIS WITH ASCITES: Status: ACTIVE | Noted: 2024-09-06

## 2024-09-06 PROBLEM — R18.8 ASCITES: Status: ACTIVE | Noted: 2024-09-06

## 2024-09-06 PROBLEM — Z71.89 GOALS OF CARE, COUNSELING/DISCUSSION: Status: ACTIVE | Noted: 2024-09-06

## 2024-09-06 LAB — BACTERIA SPEC AEROBE CULT: NO GROWTH

## 2024-09-06 PROCEDURE — 25000003 PHARM REV CODE 250: Performed by: INTERNAL MEDICINE

## 2024-09-06 PROCEDURE — 25000003 PHARM REV CODE 250: Performed by: STUDENT IN AN ORGANIZED HEALTH CARE EDUCATION/TRAINING PROGRAM

## 2024-09-06 PROCEDURE — P9047 ALBUMIN (HUMAN), 25%, 50ML: HCPCS | Mod: JZ,JG | Performed by: STUDENT IN AN ORGANIZED HEALTH CARE EDUCATION/TRAINING PROGRAM

## 2024-09-06 PROCEDURE — 63600175 PHARM REV CODE 636 W HCPCS: Mod: JA | Performed by: INTERNAL MEDICINE

## 2024-09-06 PROCEDURE — 63600175 PHARM REV CODE 636 W HCPCS: Performed by: STUDENT IN AN ORGANIZED HEALTH CARE EDUCATION/TRAINING PROGRAM

## 2024-09-06 PROCEDURE — 12000002 HC ACUTE/MED SURGE SEMI-PRIVATE ROOM

## 2024-09-06 PROCEDURE — 25000003 PHARM REV CODE 250: Performed by: HOSPITALIST

## 2024-09-06 PROCEDURE — 99223 1ST HOSP IP/OBS HIGH 75: CPT | Mod: ,,, | Performed by: INTERNAL MEDICINE

## 2024-09-06 RX ADMIN — LACTULOSE 20 G: 20 SOLUTION ORAL at 03:09

## 2024-09-06 RX ADMIN — PREDNISONE 40 MG: 20 TABLET ORAL at 09:09

## 2024-09-06 RX ADMIN — OCTREOTIDE ACETATE 50 MCG/HR: 500 INJECTION, SOLUTION INTRAVENOUS; SUBCUTANEOUS at 12:09

## 2024-09-06 RX ADMIN — LACTULOSE 20 G: 20 SOLUTION ORAL at 08:09

## 2024-09-06 RX ADMIN — SODIUM BICARBONATE 650 MG TABLET 650 MG: at 08:09

## 2024-09-06 RX ADMIN — MUPIROCIN 1 G: 20 OINTMENT TOPICAL at 08:09

## 2024-09-06 RX ADMIN — ALBUMIN (HUMAN) 25 G: 0.25 INJECTION, SOLUTION INTRAVENOUS at 10:09

## 2024-09-06 RX ADMIN — LEVETIRACETAM 500 MG: 500 TABLET, FILM COATED ORAL at 08:09

## 2024-09-06 RX ADMIN — FOLIC ACID 1 MG: 1 TABLET ORAL at 09:09

## 2024-09-06 RX ADMIN — SODIUM BICARBONATE 650 MG TABLET 650 MG: at 03:09

## 2024-09-06 RX ADMIN — MUPIROCIN 1 G: 20 OINTMENT TOPICAL at 09:09

## 2024-09-06 RX ADMIN — MIDODRINE HYDROCHLORIDE 10 MG: 10 TABLET ORAL at 08:09

## 2024-09-06 RX ADMIN — ALBUMIN (HUMAN) 25 G: 0.25 INJECTION, SOLUTION INTRAVENOUS at 08:09

## 2024-09-06 RX ADMIN — SODIUM BICARBONATE 650 MG TABLET 650 MG: at 09:09

## 2024-09-06 RX ADMIN — CIPROFLOXACIN HYDROCHLORIDE 500 MG: 500 TABLET, FILM COATED ORAL at 09:09

## 2024-09-06 RX ADMIN — LEVETIRACETAM 500 MG: 500 TABLET, FILM COATED ORAL at 09:09

## 2024-09-06 RX ADMIN — MIDODRINE HYDROCHLORIDE 10 MG: 10 TABLET ORAL at 01:09

## 2024-09-06 RX ADMIN — LACTULOSE 20 G: 20 SOLUTION ORAL at 09:09

## 2024-09-06 RX ADMIN — CIPROFLOXACIN HYDROCHLORIDE 500 MG: 500 TABLET, FILM COATED ORAL at 08:09

## 2024-09-06 RX ADMIN — THIAMINE HCL TAB 100 MG 100 MG: 100 TAB at 09:09

## 2024-09-06 NOTE — SUBJECTIVE & OBJECTIVE
Interval History: Patient is asleep. His abdomen is grossly distended. Afebrile. Patient is not oriented to place or time.     Review of Systems  Objective:     Vital Signs (Most Recent):  Temp: 98.1 °F (36.7 °C) (09/06/24 1110)  Pulse: 82 (09/06/24 1110)  Resp: 18 (09/06/24 1110)  BP: 99/70 (09/06/24 1110)  SpO2: 99 % (09/06/24 1110) Vital Signs (24h Range):  Temp:  [98.1 °F (36.7 °C)-99 °F (37.2 °C)] 98.1 °F (36.7 °C)  Pulse:  [] 82  Resp:  [18] 18  SpO2:  [98 %-100 %] 99 %  BP: ()/(70-93) 99/70     Weight: 81.4 kg (179 lb 7.3 oz)  Body mass index is 23.68 kg/m².    Intake/Output Summary (Last 24 hours) at 9/6/2024 1230  Last data filed at 9/6/2024 0949  Gross per 24 hour   Intake 460 ml   Output 175 ml   Net 285 ml         Physical Exam  Vitals reviewed.   Constitutional:       General: He is not in acute distress.     Appearance: He is ill-appearing. He is not diaphoretic.   HENT:      Mouth/Throat:      Mouth: Mucous membranes are moist.   Eyes:      General: No scleral icterus.        Right eye: No discharge.         Left eye: No discharge.   Neck:      Vascular: No JVD.   Cardiovascular:      Rate and Rhythm: Normal rate and regular rhythm.   Pulmonary:      Effort: Pulmonary effort is normal.      Breath sounds: Normal breath sounds.   Abdominal:      General: Bowel sounds are normal. There is distension, free fluid present.      Palpations: Abdomen is soft.      Tenderness: There is no abdominal tenderness.   Musculoskeletal:      Right lower leg: Pitting Edema present.      Left lower leg: Pitting Edema present.   Skin:     General: Skin is warm.      Findings: No rash.   Neurological:      Mental Status: He is alert.   Psychiatric:         Mood and Affect: Affect is flat.         Speech: Speech is delayed.         Behavior: Behavior is slowed and withdrawn.     Significant Labs: All pertinent labs within the past 24 hours have been reviewed.  CBC:   Recent Labs   Lab 09/04/24  2241  09/05/24  0841   WBC 36.31* 41.95*   HGB 9.2* 10.1*   HCT 27.5* 30.1*   * 106*     CMP:   Recent Labs   Lab 09/05/24  0841      K 4.0      CO2 23   *   BUN 49*   CREATININE 3.1*   CALCIUM 8.8   PROT 4.8*   ALBUMIN 3.1*   BILITOT 23.1*   ALKPHOS 153*   AST 69*   ALT 49*   ANIONGAP 9       Significant Imaging: I have reviewed all pertinent imaging results/findings within the past 24 hours.

## 2024-09-06 NOTE — PLAN OF CARE
Problem: Adult Inpatient Plan of Care  Goal: Plan of Care Review  Outcome: Progressing  Goal: Patient-Specific Goal (Individualized)  Outcome: Progressing  Goal: Absence of Hospital-Acquired Illness or Injury  Outcome: Progressing  Goal: Optimal Comfort and Wellbeing  Outcome: Progressing  Goal: Readiness for Transition of Care  Outcome: Progressing     Problem: Gastrointestinal Bleeding  Goal: Optimal Coping with Acute Illness  Outcome: Progressing  Goal: Hemostasis  Outcome: Progressing     Problem: Fall Injury Risk  Goal: Absence of Fall and Fall-Related Injury  Outcome: Progressing     Problem: Infection  Goal: Absence of Infection Signs and Symptoms  Outcome: Progressing     Problem: Coping Ineffective  Goal: Effective Coping  Outcome: Progressing     Problem: Acute Kidney Injury/Impairment  Goal: Fluid and Electrolyte Balance  Outcome: Progressing  Goal: Improved Oral Intake  Outcome: Progressing  Goal: Effective Renal Function  Outcome: Progressing

## 2024-09-06 NOTE — PROGRESS NOTES
"[facility] Medicine  Progress Note    Patient Name: Washington Glasgow  MRN: [id number]  Patient Class: IP- Inpatient   Admission Date: 9/2/2024  Length of Stay: 4 days  Attending Physician: Skyler Henry MD  Primary Care Provider: Sahil Gallegos NP        Subjective:     Principal Problem:Alcoholic cirrhosis of liver with ascites        HPI:  Patient is a 44-year-old male with history of alcoholic liver cirrhosis, last alcoholic drink a month ago, lower GI bleed, anemia, seizure disorder, chronic kidney disease presents to the emergency room with complaints of abdominal distention and rectal bleeding.  Patient was recently at [facility] on 08/28 for GI bleed at that time, received blood transfusion.  Underwent a colonoscopy, patient and significant other unsure if anything was found.  They report that the cause of the rectal bleeding is still unknown, reports that "banding" was done.  Patient left AMA.  Significant other reports there were discussions about undergoing tips procedure, this is still pending.  Patient follows with Gastroenterology outpatient.  Patient reports ongoing on and off rectal bleeding, and worsening abdominal distention-primary reason he came here.  Patient denies fever, chills, nausea, vomiting, chest pain, shortness of breath.  Review of labs on 08/29 showed WBC 31, hemoglobin 6.9, creatinine 4.97, bilirubin 21.  On this admission WBC 36.13, hemoglobin 8.1, creatinine 4, bilirubin 22.9.  Patient reports also following with Nephrology outpatient.  Patient transferred from [facility] for paracentesis.  Gastroenterology and Nephrology consulted.       Overview/Hospital Course:  Patient admitted to intensive care unit.  Hemoglobin and hematocrit closely monitored.  Patient received 2 units of packed red blood cells.  Gastroenterology and nephrology team consulted.  Patient maintained on midodrine, octreotide and albumin therapy.  Patient underwent " abdominal paracentesis with 5 L ascitic straw-colored fluid removal.  Patient maintained on intravenous ceftriaxone prophylaxis.  Microbiology closely followed.  Patient maintained on lactulose.  Serum ammonia trended.  Patient maintained on prednisone therapy for alcoholic hepatitis management.  Patient was evaluated by GI specialist who performed flexible sigmoidoscopy revealing a linear ulceration at dentate line.  Patient has been noncompliant with use of lactulose and has refused Macias catheter placement during hospital stay.  Multiple conversations regarding compliance with lactulose were conducted with patient and his mother. Palliative care was also consulted.     Interval History: Patient is asleep. His abdomen is grossly distended. Afebrile. Patient is not oriented to place or time.     Review of Systems  Objective:     Vital Signs (Most Recent):  Temp: 98.1 °F (36.7 °C) (09/06/24 1110)  Pulse: 82 (09/06/24 1110)  Resp: 18 (09/06/24 1110)  BP: 99/70 (09/06/24 1110)  SpO2: 99 % (09/06/24 1110) Vital Signs (24h Range):  Temp:  [98.1 °F (36.7 °C)-99 °F (37.2 °C)] 98.1 °F (36.7 °C)  Pulse:  [] 82  Resp:  [18] 18  SpO2:  [98 %-100 %] 99 %  BP: ()/(70-93) 99/70     Weight: 81.4 kg (179 lb 7.3 oz)  Body mass index is 23.68 kg/m².    Intake/Output Summary (Last 24 hours) at 9/6/2024 1230  Last data filed at 9/6/2024 0949  Gross per 24 hour   Intake 460 ml   Output 175 ml   Net 285 ml         Physical Exam  Vitals reviewed.   Constitutional:       General: He is not in acute distress.     Appearance: He is ill-appearing. He is not diaphoretic.   HENT:      Mouth/Throat:      Mouth: Mucous membranes are moist.   Eyes:      General: No scleral icterus.        Right eye: No discharge.         Left eye: No discharge.   Neck:      Vascular: No JVD.   Cardiovascular:      Rate and Rhythm: Normal rate and regular rhythm. Murmur+  Pulmonary:      Effort: Pulmonary effort is normal.      Breath sounds: Normal  breath sounds.   Abdominal:      General: Bowel sounds are normal. There is distension, free fluid present.      Palpations: Abdomen is soft.      Tenderness: There is no abdominal tenderness.   Musculoskeletal:      Right lower leg: Pitting Edema present.      Left lower leg: Pitting Edema present.   Skin:     General: Skin is warm.      Findings: No rash.   Neurological:      Mental Status: He is alert.   Psychiatric:         Mood and Affect: Affect is flat.         Speech: Speech is delayed.         Behavior: Behavior is slowed and withdrawn.     Significant Labs: All pertinent labs within the past 24 hours have been reviewed.  CBC:   Recent Labs   Lab 09/04/24  2241 09/05/24  0841   WBC 36.31* 41.95*   HGB 9.2* 10.1*   HCT 27.5* 30.1*   * 106*     CMP:   Recent Labs   Lab 09/05/24  0841      K 4.0      CO2 23   *   BUN 49*   CREATININE 3.1*   CALCIUM 8.8   PROT 4.8*   ALBUMIN 3.1*   BILITOT 23.1*   ALKPHOS 153*   AST 69*   ALT 49*   ANIONGAP 9       Significant Imaging: I have reviewed all pertinent imaging results/findings within the past 24 hours.    Assessment/Plan:      * Alcoholic cirrhosis of liver with ascites  Decompensated with ascites, hepatorenal syndrome and hepatic encephalopathy and GI bleeding    MELD-Na score calculated; MELD 3.0: 38 at 9/5/2024  8:41 AM  MELD-Na: 36 at 9/5/2024  8:41 AM  Calculated from:  Serum Creatinine: 3.1 mg/dL (Using max of 3 mg/dL) at 9/5/2024  8:41 AM  Serum Sodium: 137 mmol/L at 9/5/2024  8:41 AM  Total Bilirubin: 23.1 mg/dL at 9/5/2024  8:41 AM  Serum Albumin: 3.1 g/dL at 9/5/2024  8:41 AM  INR(ratio): 1.9 at 9/4/2024  2:38 AM  Age at listing (hypothetical): 44 years  Sex: Male at 9/5/2024  8:41 AM    Continue chronic meds. Etiology likely ETOH. Will avoid any hepatotoxic meds, and monitor CBC/CMP/INR for synthetic function.     Patient underwent abdominal paracentesis by Interventional Radiology with 5 L clear straw ascitic fluid on September  3, 2024.  Continue SBP prophylaxis.  Changed Rocephin to oral ciprofloxacin.  Cont Lactulose     Prognosis is very poor. Palliative care consulted.     Hepatic encephalopathy  Maintain patient on lactulose to ensure 3-4 bowel movements daily.  Monitor cognitive functions and level of alertness.    Alcoholic hepatitis with ascites  S/p paracentesis 9/3 5 L removed   Diuresis per Nephrology  Likely will need another paracentesis   Cont prednisone for hepatitis       REMI (acute kidney injury)  REMI is likely due to  hepatorenal syndrome . Baseline creatinine is unknown. Most recent creatinine and eGFR are listed below.  Recent Labs     09/04/24  0238 09/05/24  0841   CREATININE 3.5* 3.1*   EGFRNORACEVR 21.2* 24.5*       - Nephrology following, specific treatment for HRS, including Sandostatin and albumin  - Avoid nephrotoxins and renally dose meds for GFR listed above  - Monitor urine output, serial BMP, and adjust therapy as needed    ABLA (acute blood loss anemia)  Secondary to acute on chronic GI blood loss.  Anemia is likely due to acute blood loss.   Flex sigmoidoscopy 9/3:1cm ulceration above dentate line w/ erythema and slight ooze with manipulation. No interventions performed.      Most recent hemoglobin and hematocrit are listed below.  Recent Labs     09/04/24  0238 09/04/24  2241 09/05/24  0841   HGB 8.4* 9.2* 10.1*   HCT 24.5* 27.5* 30.1*     Stable   Patient was transfused 2 units 9/3  Patient is on octreotide infusion ? 72 hrs     Rectal bleeding  See above     Seizure disorder  Continue Keppra        VTE Risk Mitigation (From admission, onward)           Ordered     Place sequential compression device  Until discontinued         09/02/24 2045     IP VTE LOW RISK PATIENT  Once         09/02/24 2045                    Discharge Planning   ED: 9/9/2024     Code Status: Full Code   Is the patient medically ready for discharge?:     Reason for patient still in hospital (select all that apply):  Treatment  Discharge Plan A: Home with family                  Skyler Henry MD  Department of Hospital Medicine   [facility]

## 2024-09-06 NOTE — PROGRESS NOTES
"INPATIENT NEPHROLOGY Progress Note  [facility] NEPHROLOGY    Washington Glasgow  09/06/2024    Reason for consultation:    Acute kidney injury    Chief Complaint:   Chief Complaint   Patient presents with    Jaundice    Rectal Bleeding          History of Present Illness:    Per H and P  Patient is a 44-year-old male with history of alcoholic liver cirrhosis, last alcoholic drink a month ago, lower GI bleed, anemia, seizure disorder, chronic kidney disease presents to the emergency room with complaints of abdominal distention and rectal bleeding. Patient was recently at [facility] on 08/28 for GI bleed at that time, received blood transfusion. Underwent a colonoscopy, patient and significant other unsure if anything was found. They report that the cause of the rectal bleeding is still unknown, reports that "banding" was done. Patient left AMA. Significant other reports there were discussions about undergoing tips procedure, this is still pending. Patient follows with Gastroenterology outpatient. Patient reports ongoing on and off rectal bleeding, and worsening abdominal distention-primary reason he came here. Patient denies fever, chills, nausea, vomiting, chest pain, shortness of breath. Review of labs on 08/29 showed WBC 31, hemoglobin 6.9, creatinine 4.97, bilirubin 21. On this admission WBC 36.13, hemoglobin 8.1, creatinine 4, bilirubin 22.9. Patient reports also following with Nephrology outpatient. Patient transferred from [facility] for paracentesis     9/4  AFVSS, output not measured due to pt refusing to go in the urinal, no cp, sob, nausea.  More alert today.  Refused fernandez placement.  Colonoscopy limited due to stool.  1cm ulcer above dentate line seen.    9/5 refusing lactulose, with AMS, s/p 5L para neg for SBP, transfused 2u of blood, VSS, on RA, UOP 600cc +  voids- WBC rising, Hb better, renal function slightly better  9/6  VSS.  Labs not yet collected, pt is a difficult stick, no IV site " currently either.  Nurse planning to try to get blood for labs with IV attempt if possible.  No new complaints.    Plan of Care:     REMI- concern for HRS  Possible underlying CKD  ETOH cirrhosis with ascites and HE  Hyponatremia  Acidosis  Acute GIB    - SCr is improving- continue treatment for HRS- switch octreotide SQ to gtt- no nsaids or IV contrast- dose meds for CrCl < 30 - no acute RRT needs  - imaging unrevealing- check phos, PTH to evaluate for underlying CKD-  UA trace protein, no blood- UPCR 0.4   - s/p para- 5L- still distended- may need another in a few days- no SBP- refusing lactulose- will inquire why on prednisone  - add 1.5L fluid restriction  - add sod bicarb 650mg BID  - trend H/H and GI benjie    Thank you for allowing us to participate in this patient's care. We will continue to follow.    Vital Signs:  Temp Readings from Last 3 Encounters:   09/06/24 99 °F (37.2 °C) (Oral)       Pulse Readings from Last 3 Encounters:   09/06/24 100   09/03/24 66       BP Readings from Last 3 Encounters:   09/06/24 (!) 140/90   09/03/24 109/75       Weight:  Wt Readings from Last 3 Encounters:   09/04/24 81.4 kg (179 lb 7.3 oz)   Medications:  No current facility-administered medications on file prior to encounter.     Current Outpatient Medications on File Prior to Encounter   Medication Sig Dispense Refill    folic acid (FOLVITE) 1 MG tablet Take 1,000 mcg by mouth once daily.      levETIRAcetam (KEPPRA) 500 MG Tab Take 500 mg by mouth 2 (two) times daily.      predniSONE (DELTASONE) 20 MG tablet Take 40 mg by mouth once daily.      PROTONIX 40 mg tablet Take 40 mg by mouth 2 (two) times daily.      sodium bicarbonate 650 MG tablet Take 650 mg by mouth 3 (three) times daily.      thiamine mononitrate, vit B1, (VITAMIN B-1, MONONITRATE,) 100 mg Tab Take 100 mg by mouth once daily.       Scheduled Meds:   albumin human 25%  25 g Intravenous BID    ciprofloxacin HCl  500 mg Oral Q12H    folic acid  1 mg Oral Daily  "   lactulose  20 g Oral TID    levETIRAcetam  500 mg Oral BID    midodrine  10 mg Oral Q8H    mupirocin   Nasal BID    predniSONE  40 mg Oral Daily    sodium bicarbonate  650 mg Oral TID    thiamine  100 mg Oral Daily     Continuous Infusions:   octreotide (SANDOSTATIN) 500 mcg in 0.9% NaCl 100 mL infusion  50 mcg/hr Intravenous Continuous 10 mL/hr at 09/06/24 0051 50 mcg/hr at 09/06/24 0051     PRN Meds:.  Current Facility-Administered Medications:     ALPRAZolam, 0.5 mg, Oral, TID PRN    magnesium oxide, 800 mg, Oral, PRN    magnesium oxide, 800 mg, Oral, PRN    ondansetron, 4 mg, Intravenous, Q6H PRN    oxyCODONE, 5 mg, Oral, Q4H PRN    potassium bicarbonate, 35 mEq, Oral, PRN    potassium bicarbonate, 50 mEq, Oral, PRN    potassium bicarbonate, 60 mEq, Oral, PRN    potassium, sodium phosphates, 2 packet, Oral, PRN    potassium, sodium phosphates, 2 packet, Oral, PRN    potassium, sodium phosphates, 2 packet, Oral, PRN    Review of Systems:  Neg    Physical Exam:    BP (!) 140/90   Pulse 100   Temp 99 °F (37.2 °C) (Oral)   Resp 18   Ht 6' 1" (1.854 m)   Wt 81.4 kg (179 lb 7.3 oz)   SpO2 99%   BMI 23.68 kg/m²     General Appearance:    Ill appearing   Head:    Normocephalic, without obvious abnormality, atraumatic   Eyes:    PER, conjunctiva/corneas clear, EOM's intact in both eyes        Throat:   Lips, mucosa, and tongue normal; teeth and gums normal   Back:     Symmetric, no curvature, ROM normal, no CVA tenderness   Lungs:     Clear to auscultation bilaterally, respirations unlabored   Chest wall:    No tenderness or deformity   Heart:    Regular rate and rhythm, S1 and S2 normal, no murmur, rub   or gallop   Abdomen:     Mild tenderness, very distended with ascites   Extremities:   Extremities normal, atraumatic, no cyanosis or edema   Pulses:   2+ and symmetric all extremities   MSK:   No joint or muscle swelling, tenderness or deformity   Skin:   Skin color, texture, turgor normal, no rashes or " lesions   Neurologic:   No flap, poor insight     Results:  Lab Results   Component Value Date     09/05/2024    K 4.0 09/05/2024     09/05/2024    CO2 23 09/05/2024    BUN 49 (H) 09/05/2024    CREATININE 3.1 (H) 09/05/2024    CALCIUM 8.8 09/05/2024    ANIONGAP 9 09/05/2024    ESTGFRAFRICA >60 01/17/2022    EGFRNONAA >60 01/17/2022       Lab Results   Component Value Date    CALCIUM 8.8 09/05/2024    PHOS 2.7 09/05/2024       Recent Labs   Lab 09/05/24  0841   WBC 41.95*   RBC 3.20*   HGB 10.1*   HCT 30.1*   *   MCV 94   MCH 31.6*   MCHC 33.6        Imaging Results              US Abdomen Limited (Final result)  Result time 09/02/24 16:47:38   Procedure changed from US Abdomen Complete     Final result by Reva Méndez MD (09/02/24 16:47:38)                   Impression:      As above      Electronically signed by: Reva Méndez MD  Date:    09/02/2024  Time:    16:47               Narrative:    EXAMINATION:  US ABDOMEN LIMITED    CLINICAL HISTORY:  ascites; Other ascites    TECHNIQUE:  Limited ultrasound of the abdomen for ascites    COMPARISON:  None.    FINDINGS:  There is ascites seen in all 4 quadrants with moderately large amount ascites seen.  .                                       CT Abdomen Pelvis  Without Contrast (Final result)  Result time 09/02/24 16:44:36      Final result by Reva Méndez MD (09/02/24 16:44:36)                   Impression:      Features of cirrhosis with ascites and nodular contour of the liver.  Mild diffuse bladder wall thickening versus incomplete distention recommend correlation clinically if clinical consideration for cystitis or chronic bladder outlet obstruction.  Diverticulosis without CT findings of acute diverticulitis.  Findings detailed above.      Electronically signed by: Reva Méndez MD  Date:    09/02/2024  Time:    16:44               Narrative:    EXAMINATION:  CT ABDOMEN PELVIS WITHOUT CONTRAST    CLINICAL HISTORY:  abdominal  distention;    TECHNIQUE:  Low dose axial images, sagittal and coronal reformations were obtained from the lung bases to the pubic symphysis.  No p.o. contrast    COMPARISON:  None    FINDINGS:  Mild dependent hypoventilatory changes in visualized lung bases    Large volume of ascites.  Nodular contour the liver suggesting cirrhosis.  Suggestion of recanalization of the umbilical vein.  Spleen not enlarged. Splenule noted. Adrenal glands; slight diffuse thickening.    Pancreas unremarkable    Abdominal aorta no aneurysm    No calcified stones the gallbladder or CT findings of acute cholecystitis or biliary duct dilatation    Diverticulosis without CT findings of acute diverticulitis. Appendix; normal appearing appendix is thought to be visualized.    Kidney; no hydronephrosis or opaque renal stone.  No ureteral dilatation, opaque ureteral stone or ureteral obstruction evident.  The urinary bladder is mildly distended at time of the exam. Wall appears mildly diffusely thickened recommend correlation clinically if clinical consideration for cystitis or chronic bladder outlet obstruction.  The prostate gland does not appear enlarged. There is small left inguinal hernia containing ascites and fat.    Subcutaneous edema.                                    Patient care was time spent personally by me on the following activities: >35 min  Obtaining a history  Examination of patient.  Providing medical care at the patients bedside.  Developing a treatment plan with patient or surrogate and bedside caregivers  Ordering and reviewing laboratory studies, radiographic studies, pulse oximetry.  Ordering and performing treatments and interventions.  Evaluation of patient's response to treatment.  Discussions with consultants while on the unit and immediately available to the patient.  Re-evaluation of the patient's condition.  Documentation in the medical record.     Asha Davis NP    [facility]  [place]  [phone]

## 2024-09-06 NOTE — NURSING
Attempted to replace IV was unsuccessful. PT mother stated not to try again and that they usually need the ultrasound. Pt stated he did not want to be stuck again right now.

## 2024-09-06 NOTE — PLAN OF CARE
Patient confused, abdomen extended. palliative care consult placed.      09/06/24 1210   Discharge Reassessment   Assessment Type Discharge Planning Reassessment   Did the patient's condition or plan change since previous assessment? No

## 2024-09-06 NOTE — ASSESSMENT & PLAN NOTE
REMI is likely due to  hepatorenal syndrome . Baseline creatinine is unknown. Most recent creatinine and eGFR are listed below.  Recent Labs     09/04/24  0238 09/05/24  0841   CREATININE 3.5* 3.1*   EGFRNORACEVR 21.2* 24.5*       - Nephrology following, specific treatment for HRS, including Sandostatin and albumin  - Avoid nephrotoxins and renally dose meds for GFR listed above  - Monitor urine output, serial BMP, and adjust therapy as needed

## 2024-09-06 NOTE — ASSESSMENT & PLAN NOTE
S/p paracentesis 9/3 5 L removed   Diuresis per Nephrology  Likely will need another paracentesis   Cont prednisone for hepatitis

## 2024-09-06 NOTE — ASSESSMENT & PLAN NOTE
Secondary to acute on chronic GI blood loss.  Anemia is likely due to acute blood loss.   Flex sigmoidoscopy 9/3:1cm ulceration above dentate line w/ erythema and slight ooze with manipulation. No interventions performed.      Most recent hemoglobin and hematocrit are listed below.  Recent Labs     09/04/24  0238 09/04/24  2241 09/05/24  0841   HGB 8.4* 9.2* 10.1*   HCT 24.5* 27.5* 30.1*     Stable   Patient was transfused 2 units 9/3  Patient is on octreotide infusion ? 72 hrs

## 2024-09-06 NOTE — ASSESSMENT & PLAN NOTE
Decompensated with ascites, hepatorenal syndrome and hepatic encephalopathy and GI bleeding    MELD-Na score calculated; MELD 3.0: 38 at 9/5/2024  8:41 AM  MELD-Na: 36 at 9/5/2024  8:41 AM  Calculated from:  Serum Creatinine: 3.1 mg/dL (Using max of 3 mg/dL) at 9/5/2024  8:41 AM  Serum Sodium: 137 mmol/L at 9/5/2024  8:41 AM  Total Bilirubin: 23.1 mg/dL at 9/5/2024  8:41 AM  Serum Albumin: 3.1 g/dL at 9/5/2024  8:41 AM  INR(ratio): 1.9 at 9/4/2024  2:38 AM  Age at listing (hypothetical): 44 years  Sex: Male at 9/5/2024  8:41 AM    Continue chronic meds. Etiology likely ETOH. Will avoid any hepatotoxic meds, and monitor CBC/CMP/INR for synthetic function.     Patient underwent abdominal paracentesis by Interventional Radiology with 5 L clear straw ascitic fluid on September 3, 2024.  Continue SBP prophylaxis.  Changed Rocephin to oral ciprofloxacin.  Cont Lactulose     Prognosis is very poor. Palliative care consulted.

## 2024-09-07 LAB
ALBUMIN SERPL BCP-MCNC: 3.2 G/DL (ref 3.5–5.2)
ALP SERPL-CCNC: 126 U/L (ref 55–135)
ALT SERPL W/O P-5'-P-CCNC: 39 U/L (ref 10–44)
ANION GAP SERPL CALC-SCNC: 9 MMOL/L (ref 8–16)
ANISOCYTOSIS BLD QL SMEAR: SLIGHT
AST SERPL-CCNC: 47 U/L (ref 10–40)
BACTERIA BLD CULT: NORMAL
BASOPHILS # BLD AUTO: 0.04 K/UL (ref 0–0.2)
BASOPHILS NFR BLD: 0.1 % (ref 0–1.9)
BILIRUB SERPL-MCNC: 21 MG/DL (ref 0.1–1)
BUN SERPL-MCNC: 40 MG/DL (ref 6–20)
BURR CELLS BLD QL SMEAR: ABNORMAL
CALCIUM SERPL-MCNC: 8.6 MG/DL (ref 8.7–10.5)
CHLORIDE SERPL-SCNC: 106 MMOL/L (ref 95–110)
CO2 SERPL-SCNC: 22 MMOL/L (ref 23–29)
CREAT SERPL-MCNC: 2.8 MG/DL (ref 0.5–1.4)
DIFFERENTIAL METHOD BLD: ABNORMAL
EOSINOPHIL # BLD AUTO: 0 K/UL (ref 0–0.5)
EOSINOPHIL NFR BLD: 0.1 % (ref 0–8)
ERYTHROCYTE [DISTWIDTH] IN BLOOD BY AUTOMATED COUNT: 21.2 % (ref 11.5–14.5)
EST. GFR  (NO RACE VARIABLE): 27.7 ML/MIN/1.73 M^2
ESTIMATED AVG GLUCOSE: 94 MG/DL (ref 68–131)
GLUCOSE SERPL-MCNC: 143 MG/DL (ref 70–110)
HBA1C MFR BLD: 4.9 % (ref 4.5–6.2)
HCT VFR BLD AUTO: 25.7 % (ref 40–54)
HGB BLD-MCNC: 8.5 G/DL (ref 14–18)
IMM GRANULOCYTES # BLD AUTO: 0.35 K/UL (ref 0–0.04)
IMM GRANULOCYTES NFR BLD AUTO: 1 % (ref 0–0.5)
INR PPP: 2 (ref 0.8–1.2)
LYMPHOCYTES # BLD AUTO: 0.6 K/UL (ref 1–4.8)
LYMPHOCYTES NFR BLD: 1.8 % (ref 18–48)
MCH RBC QN AUTO: 32.6 PG (ref 27–31)
MCHC RBC AUTO-ENTMCNC: 33.1 G/DL (ref 32–36)
MCV RBC AUTO: 99 FL (ref 82–98)
MONOCYTES # BLD AUTO: 1.1 K/UL (ref 0.3–1)
MONOCYTES NFR BLD: 3.1 % (ref 4–15)
NEUTROPHILS # BLD AUTO: 32.8 K/UL (ref 1.8–7.7)
NEUTROPHILS NFR BLD: 93.9 % (ref 38–73)
NRBC BLD-RTO: 0 /100 WBC
PLATELET # BLD AUTO: 90 K/UL (ref 150–450)
PLATELET BLD QL SMEAR: ABNORMAL
PMV BLD AUTO: 11.8 FL (ref 9.2–12.9)
POIKILOCYTOSIS BLD QL SMEAR: SLIGHT
POTASSIUM SERPL-SCNC: 4.1 MMOL/L (ref 3.5–5.1)
PROT SERPL-MCNC: 4.4 G/DL (ref 6–8.4)
PROTHROMBIN TIME: 21.3 SEC (ref 9–12.5)
RBC # BLD AUTO: 2.61 M/UL (ref 4.6–6.2)
SCHISTOCYTES BLD QL SMEAR: PRESENT
SODIUM SERPL-SCNC: 137 MMOL/L (ref 136–145)
TARGETS BLD QL SMEAR: ABNORMAL
WBC # BLD AUTO: 34.88 K/UL (ref 3.9–12.7)

## 2024-09-07 PROCEDURE — 36410 VNPNXR 3YR/> PHY/QHP DX/THER: CPT

## 2024-09-07 PROCEDURE — C1751 CATH, INF, PER/CENT/MIDLINE: HCPCS

## 2024-09-07 PROCEDURE — 25000003 PHARM REV CODE 250: Performed by: INTERNAL MEDICINE

## 2024-09-07 PROCEDURE — 85610 PROTHROMBIN TIME: CPT | Performed by: STUDENT IN AN ORGANIZED HEALTH CARE EDUCATION/TRAINING PROGRAM

## 2024-09-07 PROCEDURE — 85025 COMPLETE CBC W/AUTO DIFF WBC: CPT | Performed by: STUDENT IN AN ORGANIZED HEALTH CARE EDUCATION/TRAINING PROGRAM

## 2024-09-07 PROCEDURE — 80053 COMPREHEN METABOLIC PANEL: CPT | Performed by: STUDENT IN AN ORGANIZED HEALTH CARE EDUCATION/TRAINING PROGRAM

## 2024-09-07 PROCEDURE — 25000003 PHARM REV CODE 250: Performed by: STUDENT IN AN ORGANIZED HEALTH CARE EDUCATION/TRAINING PROGRAM

## 2024-09-07 PROCEDURE — 83036 HEMOGLOBIN GLYCOSYLATED A1C: CPT | Performed by: STUDENT IN AN ORGANIZED HEALTH CARE EDUCATION/TRAINING PROGRAM

## 2024-09-07 PROCEDURE — 25000003 PHARM REV CODE 250: Performed by: HOSPITALIST

## 2024-09-07 PROCEDURE — 12000002 HC ACUTE/MED SURGE SEMI-PRIVATE ROOM

## 2024-09-07 PROCEDURE — 36415 COLL VENOUS BLD VENIPUNCTURE: CPT | Performed by: STUDENT IN AN ORGANIZED HEALTH CARE EDUCATION/TRAINING PROGRAM

## 2024-09-07 PROCEDURE — P9047 ALBUMIN (HUMAN), 25%, 50ML: HCPCS | Mod: JZ,JG | Performed by: STUDENT IN AN ORGANIZED HEALTH CARE EDUCATION/TRAINING PROGRAM

## 2024-09-07 PROCEDURE — 63600175 PHARM REV CODE 636 W HCPCS: Mod: JA | Performed by: INTERNAL MEDICINE

## 2024-09-07 PROCEDURE — A4216 STERILE WATER/SALINE, 10 ML: HCPCS | Performed by: INTERNAL MEDICINE

## 2024-09-07 PROCEDURE — 63600175 PHARM REV CODE 636 W HCPCS: Performed by: STUDENT IN AN ORGANIZED HEALTH CARE EDUCATION/TRAINING PROGRAM

## 2024-09-07 RX ORDER — SODIUM CHLORIDE 0.9 % (FLUSH) 0.9 %
10 SYRINGE (ML) INJECTION
Status: DISCONTINUED | OUTPATIENT
Start: 2024-09-07 | End: 2024-09-10 | Stop reason: HOSPADM

## 2024-09-07 RX ORDER — SODIUM CHLORIDE 0.9 % (FLUSH) 0.9 %
10 SYRINGE (ML) INJECTION EVERY 6 HOURS
Status: DISCONTINUED | OUTPATIENT
Start: 2024-09-07 | End: 2024-09-10 | Stop reason: HOSPADM

## 2024-09-07 RX ORDER — DIPHENHYDRAMINE HCL 25 MG
25 CAPSULE ORAL ONCE
Status: COMPLETED | OUTPATIENT
Start: 2024-09-07 | End: 2024-09-07

## 2024-09-07 RX ADMIN — LACTULOSE 20 G: 20 SOLUTION ORAL at 08:09

## 2024-09-07 RX ADMIN — FOLIC ACID 1 MG: 1 TABLET ORAL at 09:09

## 2024-09-07 RX ADMIN — SODIUM CHLORIDE, PRESERVATIVE FREE 10 ML: 5 INJECTION INTRAVENOUS at 07:09

## 2024-09-07 RX ADMIN — SODIUM CHLORIDE, PRESERVATIVE FREE 10 ML: 5 INJECTION INTRAVENOUS at 11:09

## 2024-09-07 RX ADMIN — DIPHENHYDRAMINE HYDROCHLORIDE 25 MG: 25 CAPSULE ORAL at 11:09

## 2024-09-07 RX ADMIN — LACTULOSE 20 G: 20 SOLUTION ORAL at 02:09

## 2024-09-07 RX ADMIN — SODIUM BICARBONATE 650 MG TABLET 650 MG: at 08:09

## 2024-09-07 RX ADMIN — PREDNISONE 40 MG: 20 TABLET ORAL at 09:09

## 2024-09-07 RX ADMIN — CIPROFLOXACIN HYDROCHLORIDE 500 MG: 500 TABLET, FILM COATED ORAL at 08:09

## 2024-09-07 RX ADMIN — ALBUMIN (HUMAN) 25 G: 0.25 INJECTION, SOLUTION INTRAVENOUS at 08:09

## 2024-09-07 RX ADMIN — OXYCODONE HYDROCHLORIDE 5 MG: 5 TABLET ORAL at 03:09

## 2024-09-07 RX ADMIN — THIAMINE HCL TAB 100 MG 100 MG: 100 TAB at 09:09

## 2024-09-07 RX ADMIN — ALBUMIN (HUMAN) 25 G: 0.25 INJECTION, SOLUTION INTRAVENOUS at 09:09

## 2024-09-07 RX ADMIN — SODIUM BICARBONATE 650 MG TABLET 650 MG: at 02:09

## 2024-09-07 RX ADMIN — SODIUM BICARBONATE 650 MG TABLET 650 MG: at 09:09

## 2024-09-07 RX ADMIN — CIPROFLOXACIN HYDROCHLORIDE 500 MG: 500 TABLET, FILM COATED ORAL at 09:09

## 2024-09-07 RX ADMIN — LEVETIRACETAM 500 MG: 500 TABLET, FILM COATED ORAL at 08:09

## 2024-09-07 RX ADMIN — LEVETIRACETAM 500 MG: 500 TABLET, FILM COATED ORAL at 09:09

## 2024-09-07 RX ADMIN — MUPIROCIN 1 G: 20 OINTMENT TOPICAL at 09:09

## 2024-09-07 RX ADMIN — OCTREOTIDE ACETATE 50 MCG/HR: 500 INJECTION, SOLUTION INTRAVENOUS; SUBCUTANEOUS at 02:09

## 2024-09-07 NOTE — SUBJECTIVE & OBJECTIVE
Interval History:  See HPI    Past Medical History:   Diagnosis Date    Liver disease     Seizures        Past Surgical History:   Procedure Laterality Date    FLEXIBLE SIGMOIDOSCOPY N/A 9/3/2024    Procedure: SIGMOIDOSCOPY, FLEXIBLE;  Surgeon: Jamie Reich III, MD;  Location: [facility];  Service: Endoscopy;  Laterality: N/A;       Review of patient's allergies indicates:  No Known Allergies    Medications:  Continuous Infusions:   octreotide (SANDOSTATIN) 500 mcg in 0.9% NaCl 100 mL infusion  50 mcg/hr Intravenous Continuous 10 mL/hr at 09/06/24 0051 50 mcg/hr at 09/06/24 0051     Scheduled Meds:   albumin human 25%  25 g Intravenous BID    ciprofloxacin HCl  500 mg Oral Q12H    folic acid  1 mg Oral Daily    lactulose  20 g Oral TID    levETIRAcetam  500 mg Oral BID    midodrine  10 mg Oral Q8H    mupirocin   Nasal BID    predniSONE  40 mg Oral Daily    sodium bicarbonate  650 mg Oral TID    thiamine  100 mg Oral Daily     PRN Meds:  Current Facility-Administered Medications:     ALPRAZolam, 0.5 mg, Oral, TID PRN    magnesium oxide, 800 mg, Oral, PRN    magnesium oxide, 800 mg, Oral, PRN    ondansetron, 4 mg, Intravenous, Q6H PRN    oxyCODONE, 5 mg, Oral, Q4H PRN    potassium bicarbonate, 35 mEq, Oral, PRN    potassium bicarbonate, 50 mEq, Oral, PRN    potassium bicarbonate, 60 mEq, Oral, PRN    potassium, sodium phosphates, 2 packet, Oral, PRN    potassium, sodium phosphates, 2 packet, Oral, PRN    potassium, sodium phosphates, 2 packet, Oral, PRN    Family History    None       Tobacco Use    Smoking status: Never    Smokeless tobacco: Never   Substance and Sexual Activity    Alcohol use: Not Currently    Drug use: Never    Sexual activity: Not Currently       Review of Systems   Unable to perform ROS: Mental status change     Objective:     Vital Signs (Most Recent):  Temp: 98.6 °F (37 °C) (09/06/24 1907)  Pulse: 98 (09/06/24 1907)  Resp: 17 (09/06/24 1907)  BP: (!) 140/98 (09/06/24 1907)  SpO2: 98 %  (09/06/24 1907) Vital Signs (24h Range):  Temp:  [98.1 °F (36.7 °C)-99 °F (37.2 °C)] 98.6 °F (37 °C)  Pulse:  [] 98  Resp:  [17-18] 17  SpO2:  [98 %-100 %] 98 %  BP: ()/(70-98) 140/98     Weight: 81.4 kg (179 lb 7.3 oz)  Body mass index is 23.68 kg/m².       Physical Exam  Vitals reviewed.   Constitutional:       General: He is not in acute distress.     Appearance: He is ill-appearing.   HENT:      Head: Normocephalic and atraumatic.      Right Ear: External ear normal.      Left Ear: External ear normal.      Nose: Nose normal.      Mouth/Throat:      Mouth: Mucous membranes are moist.      Pharynx: No oropharyngeal exudate.   Eyes:      General:         Right eye: No discharge.         Left eye: No discharge.      Extraocular Movements: Extraocular movements intact.   Cardiovascular:      Rate and Rhythm: Normal rate.   Pulmonary:      Effort: Pulmonary effort is normal. No respiratory distress.   Abdominal:      General: There is distension.      Palpations: Abdomen is soft.      Tenderness: There is no abdominal tenderness.   Neurological:      Mental Status: He is alert. He is disoriented.   Psychiatric:      Comments: Minimally responsive.  Does not participate in conversation to fully assess.            Review of Symptoms      Symptom Assessment (ESAS 0-10 Scale)  Unable to complete assessment due to Mental status change         Pain Assessment in Advanced Demential Scale (PAINAD)   Breathing - Independent of vocalization:  0  Negative vocalization:  0  Facial expression:  0  Body language:  0  Consolability:  0  Total:  0    Performance Status:  20    Psychosocial/Cultural:   See Palliative Psychosocial Note: No  **Primary  to Follow**  Palliative Care  Consult: No        Advance Care Planning   Advance Care Planning       Significant Labs: BMP:   Recent Labs   Lab 09/05/24  0841   *      K 4.0      CO2 23   BUN 49*   CREATININE 3.1*   CALCIUM 8.8  "    CBC:   Recent Labs   Lab 09/04/24  2241 09/05/24  0841   WBC 36.31* 41.95*   HGB 9.2* 10.1*   HCT 27.5* 30.1*   * 106*     CBC:   Recent Labs   Lab 09/05/24  0841   WBC 41.95*   HGB 10.1*   HCT 30.1*   MCV 94   *     BMP:  No results for input(s): "GLU", "NA", "K", "CL", "CO2", "BUN", "CREATININE", "CALCIUM", "MG" in the last 24 hours.  LFT:  Lab Results   Component Value Date    AST 69 (H) 09/05/2024    ALKPHOS 153 (H) 09/05/2024    BILITOT 23.1 (H) 09/05/2024     Albumin:   Albumin   Date Value Ref Range Status   09/05/2024 3.1 (L) 3.5 - 5.2 g/dL Final     Protein:   Total Protein   Date Value Ref Range Status   09/05/2024 4.8 (L) 6.0 - 8.4 g/dL Final     Lactic acid:   Lab Results   Component Value Date    LACTATE 25.66 (HH) 01/17/2022       Significant Imaging: I have reviewed all pertinent imaging results/findings within the past 24 hours.    "

## 2024-09-07 NOTE — PLAN OF CARE
Problem: Adult Inpatient Plan of Care  Goal: Plan of Care Review  Outcome: Progressing  Goal: Patient-Specific Goal (Individualized)  Outcome: Progressing  Goal: Absence of Hospital-Acquired Illness or Injury  Outcome: Progressing  Goal: Optimal Comfort and Wellbeing  Outcome: Progressing  Goal: Readiness for Transition of Care  Outcome: Progressing     Problem: Gastrointestinal Bleeding  Goal: Optimal Coping with Acute Illness  Outcome: Progressing  Goal: Hemostasis  Outcome: Progressing     Problem: Fall Injury Risk  Goal: Absence of Fall and Fall-Related Injury  Outcome: Progressing

## 2024-09-07 NOTE — SUBJECTIVE & OBJECTIVE
Interval History: Patient said he feels better    Review of Systems   All other systems reviewed and are negative.    Objective:     Vital Signs (Most Recent):  Temp: 97.7 °F (36.5 °C) (09/07/24 1100)  Pulse: 71 (09/07/24 1100)  Resp: 18 (09/07/24 1546)  BP: 131/85 (09/07/24 1100)  SpO2: 100 % (09/07/24 1100) Vital Signs (24h Range):  Temp:  [97.7 °F (36.5 °C)-98.6 °F (37 °C)] 97.7 °F (36.5 °C)  Pulse:  [56-98] 71  Resp:  [17-18] 18  SpO2:  [96 %-100 %] 100 %  BP: (120-140)/(85-98) 131/85     Weight: 81.4 kg (179 lb 7.3 oz)  Body mass index is 23.68 kg/m².    Intake/Output Summary (Last 24 hours) at 9/7/2024 1653  Last data filed at 9/7/2024 0832  Gross per 24 hour   Intake 480 ml   Output --   Net 480 ml         Physical Exam  Cardiovascular:      Rate and Rhythm: Normal rate.      Pulses: Normal pulses.   Pulmonary:      Effort: Pulmonary effort is normal.   Abdominal:      General: There is distension.      Tenderness: There is no abdominal tenderness.   Neurological:      Mental Status: He is alert and oriented to person, place, and time.             Significant Labs: All pertinent labs within the past 24 hours have been reviewed.    Significant Imaging: I have reviewed all pertinent imaging results/findings within the past 24 hours.

## 2024-09-07 NOTE — HPI
"Per admit H&P: "Patient is a 44-year-old male with history of alcoholic liver cirrhosis, last alcoholic drink a month ago, lower GI bleed, anemia, seizure disorder, chronic kidney disease presents to the emergency room with complaints of abdominal distention and rectal bleeding. Patient was recently at [facility] on 08/28 for GI bleed at that time, received blood transfusion. Underwent a colonoscopy, patient and significant other unsure if anything was found. They report that the cause of the rectal bleeding is still unknown, reports that "banding" was done. Patient left AMA. Significant other reports there were discussions about undergoing tips procedure, this is still pending. Patient follows with Gastroenterology outpatient. Patient reports ongoing on and off rectal bleeding, and worsening abdominal distention-primary reason he came here. Patient denies fever, chills, nausea, vomiting, chest pain, shortness of breath. Review of labs on 08/29 showed WBC 31, hemoglobin 6.9, creatinine 4.97, bilirubin 21. On this admission WBC 36.13, hemoglobin 8.1, creatinine 4, bilirubin 22.9. Patient reports also following with Nephrology outpatient. Patient transferred from [facility] for paracentesis. Gastroenterology and Nephrology consulted. "    Since admission he has been treated for acute on chronic liver failure (meld 36), kidney failure related to hepatorenal syndrome, GI bleed, and hepatic encephalopathy.  With supportive measures, his lab work has improved.  However his prognosis going forward remains very poor.  I have been asked to assist goals of care.  "

## 2024-09-07 NOTE — CONSULTS
"[facility]  Palliative Medicine  Consult Note    Patient Name: Washington Glasgow  MRN: [id number]  Admission Date: 9/2/2024  Hospital Length of Stay: 4 days  Code Status: Full Code   Attending Provider: Skyler Henry MD  Consulting Provider: Richie Silva MD  Primary Care Physician: Sahil Gallegos NP  Principal Problem:Alcoholic cirrhosis of liver with ascites    Patient information was obtained from patient, parent, past medical records, and primary team.      Inpatient consult to Palliative Care  Consult performed by: Richie Silva MD  Consult ordered by: Skyler Henry MD      Inpatient consult to Palliative Care  Consult performed by: Richie Silva MD  Consult ordered by: Judah Mehta MD        Assessment/Plan:     Palliative Care  Goals of care, counseling/discussion  I reviewed the patient's chart and discussed the case with the patient's team.      I examined Washington Glasgow at bedside.  The patient lacks capacity for complex medical decision-making.  I spoke with his mother at bedside.    I introduced myself and my role as palliative care physician. They were agreeable to speaking.    We discussed the patient's medical illness, prognosis, and values in detail.  Below is a brief summary of our discussion.    She understands that he was admitted being treated for liver failure, kidney failure, and GI bleed.  She was not entirely up-to-date on his overall medical condition.  I did take a moment to educate her.    We discussed his prognosis.  She understands my worry if he continues on his current trajectory, in the best case scenario his prognosis is thought of in terms of weeks to months.  She was surprised by this news.  She was hopeful for better.      We discussed his values.  He is living for "a lot". He is fighting for his family.  She believes he was willing to pursue any and all available measures in order to live.  She believes he would be willing to quit " "drinking.  He fears dying.    Today, I simply took a moment to build her poor.  It was clear his mother did not desire any changes in his current goals of care.    I did let her know we will continue to keep her and her family up-to-date in his condition.  Whether he was experiences changes for the better or for the worst.    If it becomes apparent that he was dying, we will let her know and make recommendations of comfort care.  She was hopeful these recommendations will not come.  However, she seems to understand gravity of his current situation.    We will follow up on Monday.        Thank you for your consult. I will follow-up with patient. Please contact us if you have any additional questions.    Subjective:     HPI:   Per admit H&P: "Patient is a 44-year-old male with history of alcoholic liver cirrhosis, last alcoholic drink a month ago, lower GI bleed, anemia, seizure disorder, chronic kidney disease presents to the emergency room with complaints of abdominal distention and rectal bleeding. Patient was recently at [facility] on 08/28 for GI bleed at that time, received blood transfusion. Underwent a colonoscopy, patient and significant other unsure if anything was found. They report that the cause of the rectal bleeding is still unknown, reports that "banding" was done. Patient left AMA. Significant other reports there were discussions about undergoing tips procedure, this is still pending. Patient follows with Gastroenterology outpatient. Patient reports ongoing on and off rectal bleeding, and worsening abdominal distention-primary reason he came here. Patient denies fever, chills, nausea, vomiting, chest pain, shortness of breath. Review of labs on 08/29 showed WBC 31, hemoglobin 6.9, creatinine 4.97, bilirubin 21. On this admission WBC 36.13, hemoglobin 8.1, creatinine 4, bilirubin 22.9. Patient reports also following with Nephrology outpatient. Patient transferred from [facility] for " "paracentesis. Gastroenterology and Nephrology consulted. "    Since admission he has been treated for acute on chronic liver failure (meld 36), kidney failure related to hepatorenal syndrome, GI bleed, and hepatic encephalopathy.  With supportive measures, his lab work has improved.  However his prognosis going forward remains very poor.  I have been asked to assist goals of care.    Hospital Course:  No notes on file    Interval History:  See HPI    Past Medical History:   Diagnosis Date    Liver disease     Seizures        Past Surgical History:   Procedure Laterality Date    FLEXIBLE SIGMOIDOSCOPY N/A 9/3/2024    Procedure: SIGMOIDOSCOPY, FLEXIBLE;  Surgeon: Jamie Reich III, MD;  Location: [facility];  Service: Endoscopy;  Laterality: N/A;       Review of patient's allergies indicates:  No Known Allergies    Medications:  Continuous Infusions:   octreotide (SANDOSTATIN) 500 mcg in 0.9% NaCl 100 mL infusion  50 mcg/hr Intravenous Continuous 10 mL/hr at 09/06/24 0051 50 mcg/hr at 09/06/24 0051     Scheduled Meds:   albumin human 25%  25 g Intravenous BID    ciprofloxacin HCl  500 mg Oral Q12H    folic acid  1 mg Oral Daily    lactulose  20 g Oral TID    levETIRAcetam  500 mg Oral BID    midodrine  10 mg Oral Q8H    mupirocin   Nasal BID    predniSONE  40 mg Oral Daily    sodium bicarbonate  650 mg Oral TID    thiamine  100 mg Oral Daily     PRN Meds:  Current Facility-Administered Medications:     ALPRAZolam, 0.5 mg, Oral, TID PRN    magnesium oxide, 800 mg, Oral, PRN    magnesium oxide, 800 mg, Oral, PRN    ondansetron, 4 mg, Intravenous, Q6H PRN    oxyCODONE, 5 mg, Oral, Q4H PRN    potassium bicarbonate, 35 mEq, Oral, PRN    potassium bicarbonate, 50 mEq, Oral, PRN    potassium bicarbonate, 60 mEq, Oral, PRN    potassium, sodium phosphates, 2 packet, Oral, PRN    potassium, sodium phosphates, 2 packet, Oral, PRN    potassium, sodium phosphates, 2 packet, Oral, PRN    Family History    None       Tobacco " Use    Smoking status: Never    Smokeless tobacco: Never   Substance and Sexual Activity    Alcohol use: Not Currently    Drug use: Never    Sexual activity: Not Currently       Review of Systems   Unable to perform ROS: Mental status change     Objective:     Vital Signs (Most Recent):  Temp: 98.6 °F (37 °C) (09/06/24 1907)  Pulse: 98 (09/06/24 1907)  Resp: 17 (09/06/24 1907)  BP: (!) 140/98 (09/06/24 1907)  SpO2: 98 % (09/06/24 1907) Vital Signs (24h Range):  Temp:  [98.1 °F (36.7 °C)-99 °F (37.2 °C)] 98.6 °F (37 °C)  Pulse:  [] 98  Resp:  [17-18] 17  SpO2:  [98 %-100 %] 98 %  BP: ()/(70-98) 140/98     Weight: 81.4 kg (179 lb 7.3 oz)  Body mass index is 23.68 kg/m².       Physical Exam  Vitals reviewed.   Constitutional:       General: He is not in acute distress.     Appearance: He is ill-appearing.   HENT:      Head: Normocephalic and atraumatic.      Right Ear: External ear normal.      Left Ear: External ear normal.      Nose: Nose normal.      Mouth/Throat:      Mouth: Mucous membranes are moist.      Pharynx: No oropharyngeal exudate.   Eyes:      General:         Right eye: No discharge.         Left eye: No discharge.      Extraocular Movements: Extraocular movements intact.   Cardiovascular:      Rate and Rhythm: Normal rate.   Pulmonary:      Effort: Pulmonary effort is normal. No respiratory distress.   Abdominal:      General: There is distension.      Palpations: Abdomen is soft.      Tenderness: There is no abdominal tenderness.   Neurological:      Mental Status: He is alert. He is disoriented.   Psychiatric:      Comments: Minimally responsive.  Does not participate in conversation to fully assess.            Review of Symptoms      Symptom Assessment (ESAS 0-10 Scale)  Unable to complete assessment due to Mental status change         Pain Assessment in Advanced Demential Scale (PAINAD)   Breathing - Independent of vocalization:  0  Negative vocalization:  0  Facial expression:  0  Body  "language:  0  Consolability:  0  Total:  0    Performance Status:  20    Psychosocial/Cultural:   See Palliative Psychosocial Note: No  **Primary  to Follow**  Palliative Care  Consult: No        Advance Care Planning  Advance Care Planning       Significant Labs: BMP:   Recent Labs   Lab 09/05/24  0841   *      K 4.0      CO2 23   BUN 49*   CREATININE 3.1*   CALCIUM 8.8     CBC:   Recent Labs   Lab 09/04/24  2241 09/05/24  0841   WBC 36.31* 41.95*   HGB 9.2* 10.1*   HCT 27.5* 30.1*   * 106*     CBC:   Recent Labs   Lab 09/05/24  0841   WBC 41.95*   HGB 10.1*   HCT 30.1*   MCV 94   *     BMP:  No results for input(s): "GLU", "NA", "K", "CL", "CO2", "BUN", "CREATININE", "CALCIUM", "MG" in the last 24 hours.  LFT:  Lab Results   Component Value Date    AST 69 (H) 09/05/2024    ALKPHOS 153 (H) 09/05/2024    BILITOT 23.1 (H) 09/05/2024     Albumin:   Albumin   Date Value Ref Range Status   09/05/2024 3.1 (L) 3.5 - 5.2 g/dL Final     Protein:   Total Protein   Date Value Ref Range Status   09/05/2024 4.8 (L) 6.0 - 8.4 g/dL Final     Lactic acid:   Lab Results   Component Value Date    LACTATE 25.66 (HH) 01/17/2022       Significant Imaging: I have reviewed all pertinent imaging results/findings within the past 24 hours.      I spent a total of 75 minutes on the day of the visit. This includes face to face time in discussion of goals of care, symptom assessment, coordination of care and emotional support.  This also includes non-face to face time preparing to see the patient (eg, review of tests/imaging), obtaining and/or reviewing separately obtained history, documenting clinical information in the electronic or other health record, independently interpreting results and communicating results to the patient/family/caregiver, or care coordinator.    Richie Silva MD  Palliative Medicine  [facility]              "

## 2024-09-07 NOTE — PROGRESS NOTES
"INPATIENT NEPHROLOGY Progress Note  [facility] NEPHROLOGY    Washington Glasgow  09/07/2024    Reason for consultation:    Acute kidney injury    Chief Complaint:   Chief Complaint   Patient presents with    Jaundice    Rectal Bleeding          History of Present Illness:    Per H and P  Patient is a 44-year-old male with history of alcoholic liver cirrhosis, last alcoholic drink a month ago, lower GI bleed, anemia, seizure disorder, chronic kidney disease presents to the emergency room with complaints of abdominal distention and rectal bleeding. Patient was recently at [facility] on 08/28 for GI bleed at that time, received blood transfusion. Underwent a colonoscopy, patient and significant other unsure if anything was found. They report that the cause of the rectal bleeding is still unknown, reports that "banding" was done. Patient left AMA. Significant other reports there were discussions about undergoing tips procedure, this is still pending. Patient follows with Gastroenterology outpatient. Patient reports ongoing on and off rectal bleeding, and worsening abdominal distention-primary reason he came here. Patient denies fever, chills, nausea, vomiting, chest pain, shortness of breath. Review of labs on 08/29 showed WBC 31, hemoglobin 6.9, creatinine 4.97, bilirubin 21. On this admission WBC 36.13, hemoglobin 8.1, creatinine 4, bilirubin 22.9. Patient reports also following with Nephrology outpatient. Patient transferred from [facility] for paracentesis     9/4  AFVSS, output not measured due to pt refusing to go in the urinal, no cp, sob, nausea.  More alert today.  Refused fernandez placement.  Colonoscopy limited due to stool.  1cm ulcer above dentate line seen.    9/5 refusing lactulose, with AMS, s/p 5L para neg for SBP, transfused 2u of blood, VSS, on RA, UOP 600cc +  voids- WBC rising, Hb better, renal function slightly better  9/6  VSS.  Labs not yet collected, pt is a difficult stick, no IV site " currently either.  Nurse planning to try to get blood for labs with IV attempt if possible.  No new complaints.  9/7  appreciate palliative care input.  VSS, UOP not recorded.  Renal function improving, liver enzymes trending down.  Updated pt/mother at bedside.    Plan of Care:     REMI- concern for HRS  Possible underlying CKD  ETOH cirrhosis with ascites and HE  Hyponatremia  Acidosis  Acute GIB    - SCr is improving- continue treatment for HRS- switched octreotide SQ to gtt- no nsaids or IV contrast- dose meds for CrCl < 30 - no acute RRT needs  - imaging unrevealing- check phos-2.7, PTH - 91.3 to evaluate for underlying CKD-  UA trace protein, no blood- UPCR 0.4   - s/p para- 5L- may need another in a few days- no SBP- refusing lactulose- will inquire why on prednisone--alcoholic hepatitis mgt per primary notes  - add 1.5L fluid restriction  - added sod bicarb 650mg BID, improving  - trend H/H and GI eval    Thank you for allowing us to participate in this patient's care. We will continue to follow.    Vital Signs:  Temp Readings from Last 3 Encounters:   09/07/24 97.8 °F (36.6 °C) (Oral)       Pulse Readings from Last 3 Encounters:   09/07/24 63   09/03/24 66       BP Readings from Last 3 Encounters:   09/07/24 135/87   09/03/24 109/75       Weight:  Wt Readings from Last 3 Encounters:   09/04/24 81.4 kg (179 lb 7.3 oz)   Medications:  No current facility-administered medications on file prior to encounter.     Current Outpatient Medications on File Prior to Encounter   Medication Sig Dispense Refill    folic acid (FOLVITE) 1 MG tablet Take 1,000 mcg by mouth once daily.      levETIRAcetam (KEPPRA) 500 MG Tab Take 500 mg by mouth 2 (two) times daily.      predniSONE (DELTASONE) 20 MG tablet Take 40 mg by mouth once daily.      PROTONIX 40 mg tablet Take 40 mg by mouth 2 (two) times daily.      sodium bicarbonate 650 MG tablet Take 650 mg by mouth 3 (three) times daily.      thiamine mononitrate, vit B1,  "(VITAMIN B-1, MONONITRATE,) 100 mg Tab Take 100 mg by mouth once daily.       Scheduled Meds:   albumin human 25%  25 g Intravenous BID    ciprofloxacin HCl  500 mg Oral Q12H    folic acid  1 mg Oral Daily    lactulose  20 g Oral TID    levETIRAcetam  500 mg Oral BID    midodrine  10 mg Oral Q8H    mupirocin   Nasal BID    predniSONE  40 mg Oral Daily    sodium bicarbonate  650 mg Oral TID    thiamine  100 mg Oral Daily     Continuous Infusions:   octreotide (SANDOSTATIN) 500 mcg in 0.9% NaCl 100 mL infusion  50 mcg/hr Intravenous Continuous 10 mL/hr at 09/07/24 0221 50 mcg/hr at 09/07/24 0221     PRN Meds:.  Current Facility-Administered Medications:     ALPRAZolam, 0.5 mg, Oral, TID PRN    magnesium oxide, 800 mg, Oral, PRN    magnesium oxide, 800 mg, Oral, PRN    ondansetron, 4 mg, Intravenous, Q6H PRN    oxyCODONE, 5 mg, Oral, Q4H PRN    potassium bicarbonate, 35 mEq, Oral, PRN    potassium bicarbonate, 50 mEq, Oral, PRN    potassium bicarbonate, 60 mEq, Oral, PRN    potassium, sodium phosphates, 2 packet, Oral, PRN    potassium, sodium phosphates, 2 packet, Oral, PRN    potassium, sodium phosphates, 2 packet, Oral, PRN    Review of Systems:  Neg    Physical Exam:    /87   Pulse 63   Temp 97.8 °F (36.6 °C) (Oral)   Resp 18   Ht 6' 1" (1.854 m)   Wt 81.4 kg (179 lb 7.3 oz)   SpO2 96%   BMI 23.68 kg/m²     General Appearance:    Ill appearing   Head:    Normocephalic, without obvious abnormality, atraumatic   Eyes:    PER, conjunctiva/corneas clear, EOM's intact in both eyes        Throat:   Lips, mucosa, and tongue normal; teeth and gums normal   Back:     Symmetric, no curvature, ROM normal, no CVA tenderness   Lungs:     Clear to auscultation bilaterally, respirations unlabored   Chest wall:    No tenderness or deformity   Heart:    Regular rate and rhythm, S1 and S2 normal, no murmur, rub   or gallop   Abdomen:     Mild tenderness, very distended with ascites   Extremities:   Extremities normal, " atraumatic, no cyanosis or edema   Pulses:   2+ and symmetric all extremities   MSK:   No joint or muscle swelling, tenderness or deformity   Skin:   Skin color, texture, turgor normal, no rashes or lesions   Neurologic:   No flap, poor insight     Results:  Lab Results   Component Value Date     09/07/2024    K 4.1 09/07/2024     09/07/2024    CO2 22 (L) 09/07/2024    BUN 40 (H) 09/07/2024    CREATININE 2.8 (H) 09/07/2024    CALCIUM 8.6 (L) 09/07/2024    ANIONGAP 9 09/07/2024    ESTGFRAFRICA >60 01/17/2022    EGFRNONAA >60 01/17/2022       Lab Results   Component Value Date    CALCIUM 8.6 (L) 09/07/2024    PHOS 2.7 09/05/2024       Recent Labs   Lab 09/07/24  0550   WBC 34.88*   RBC 2.61*   HGB 8.5*   HCT 25.7*   PLT 90*   MCV 99*   MCH 32.6*   MCHC 33.1        Imaging Results              US Abdomen Limited (Final result)  Result time 09/02/24 16:47:38   Procedure changed from US Abdomen Complete     Final result by Reva Méndez MD (09/02/24 16:47:38)                   Impression:      As above      Electronically signed by: Reva Méndez MD  Date:    09/02/2024  Time:    16:47               Narrative:    EXAMINATION:  US ABDOMEN LIMITED    CLINICAL HISTORY:  ascites; Other ascites    TECHNIQUE:  Limited ultrasound of the abdomen for ascites    COMPARISON:  None.    FINDINGS:  There is ascites seen in all 4 quadrants with moderately large amount ascites seen.  .                                       CT Abdomen Pelvis  Without Contrast (Final result)  Result time 09/02/24 16:44:36      Final result by Reva Méndez MD (09/02/24 16:44:36)                   Impression:      Features of cirrhosis with ascites and nodular contour of the liver.  Mild diffuse bladder wall thickening versus incomplete distention recommend correlation clinically if clinical consideration for cystitis or chronic bladder outlet obstruction.  Diverticulosis without CT findings of acute diverticulitis.  Findings  detailed above.      Electronically signed by: Reva Méndez MD  Date:    09/02/2024  Time:    16:44               Narrative:    EXAMINATION:  CT ABDOMEN PELVIS WITHOUT CONTRAST    CLINICAL HISTORY:  abdominal distention;    TECHNIQUE:  Low dose axial images, sagittal and coronal reformations were obtained from the lung bases to the pubic symphysis.  No p.o. contrast    COMPARISON:  None    FINDINGS:  Mild dependent hypoventilatory changes in visualized lung bases    Large volume of ascites.  Nodular contour the liver suggesting cirrhosis.  Suggestion of recanalization of the umbilical vein.  Spleen not enlarged. Splenule noted. Adrenal glands; slight diffuse thickening.    Pancreas unremarkable    Abdominal aorta no aneurysm    No calcified stones the gallbladder or CT findings of acute cholecystitis or biliary duct dilatation    Diverticulosis without CT findings of acute diverticulitis. Appendix; normal appearing appendix is thought to be visualized.    Kidney; no hydronephrosis or opaque renal stone.  No ureteral dilatation, opaque ureteral stone or ureteral obstruction evident.  The urinary bladder is mildly distended at time of the exam. Wall appears mildly diffusely thickened recommend correlation clinically if clinical consideration for cystitis or chronic bladder outlet obstruction.  The prostate gland does not appear enlarged. There is small left inguinal hernia containing ascites and fat.    Subcutaneous edema.                                    Patient care was time spent personally by me on the following activities: >35 min  Obtaining a history  Examination of patient.  Providing medical care at the patients bedside.  Developing a treatment plan with patient or surrogate and bedside caregivers  Ordering and reviewing laboratory studies, radiographic studies, pulse oximetry.  Ordering and performing treatments and interventions.  Evaluation of patient's response to treatment.  Discussions with  consultants while on the unit and immediately available to the patient.  Re-evaluation of the patient's condition.  Documentation in the medical record.     Asha Davis NP    [facility] [place]  [phone]

## 2024-09-07 NOTE — PROGRESS NOTES
"[facility] Medicine  Progress Note    Patient Name: Washington Glasgow  MRN: [id number]  Patient Class: IP- Inpatient   Admission Date: 9/2/2024  Length of Stay: 5 days  Attending Physician: Wilbert Lopez DO  Primary Care Provider: Sahil Gallegos NP        Subjective:     Principal Problem:Alcoholic cirrhosis of liver with ascites        HPI:  Patient is a 44-year-old male with history of alcoholic liver cirrhosis, last alcoholic drink a month ago, lower GI bleed, anemia, seizure disorder, chronic kidney disease presents to the emergency room with complaints of abdominal distention and rectal bleeding.  Patient was recently at [facility] on 08/28 for GI bleed at that time, received blood transfusion.  Underwent a colonoscopy, patient and significant other unsure if anything was found.  They report that the cause of the rectal bleeding is still unknown, reports that "banding" was done.  Patient left AMA.  Significant other reports there were discussions about undergoing tips procedure, this is still pending.  Patient follows with Gastroenterology outpatient.  Patient reports ongoing on and off rectal bleeding, and worsening abdominal distention-primary reason he came here.  Patient denies fever, chills, nausea, vomiting, chest pain, shortness of breath.  Review of labs on 08/29 showed WBC 31, hemoglobin 6.9, creatinine 4.97, bilirubin 21.  On this admission WBC 36.13, hemoglobin 8.1, creatinine 4, bilirubin 22.9.  Patient reports also following with Nephrology outpatient.  Patient transferred from [facility] for paracentesis.  Gastroenterology and Nephrology consulted.       Overview/Hospital Course:  Patient admitted to intensive care unit.  Hemoglobin and hematocrit closely monitored.  Patient received 2 units of packed red blood cells.  Gastroenterology and nephrology team consulted.  Patient maintained on midodrine, octreotide and albumin therapy.  Patient underwent abdominal " paracentesis with 5 L ascitic straw-colored fluid removal.  Patient maintained on intravenous ceftriaxone prophylaxis.  Microbiology closely followed.  Patient maintained on lactulose.  Serum ammonia trended.  Patient maintained on prednisone therapy for alcoholic hepatitis management.  Patient was evaluated by GI specialist who performed flexible sigmoidoscopy revealing a linear ulceration at dentate line.  Patient has been noncompliant with use of lactulose and has refused Macias catheter placement during hospital stay.  Multiple conversations regarding compliance with lactulose were conducted with patient and his mother. Palliative care was also consulted.     Interval History: Patient said he feels better    Review of Systems   All other systems reviewed and are negative.    Objective:     Vital Signs (Most Recent):  Temp: 97.7 °F (36.5 °C) (09/07/24 1100)  Pulse: 71 (09/07/24 1100)  Resp: 18 (09/07/24 1546)  BP: 131/85 (09/07/24 1100)  SpO2: 100 % (09/07/24 1100) Vital Signs (24h Range):  Temp:  [97.7 °F (36.5 °C)-98.6 °F (37 °C)] 97.7 °F (36.5 °C)  Pulse:  [56-98] 71  Resp:  [17-18] 18  SpO2:  [96 %-100 %] 100 %  BP: (120-140)/(85-98) 131/85     Weight: 81.4 kg (179 lb 7.3 oz)  Body mass index is 23.68 kg/m².    Intake/Output Summary (Last 24 hours) at 9/7/2024 1653  Last data filed at 9/7/2024 0832  Gross per 24 hour   Intake 480 ml   Output --   Net 480 ml         Physical Exam  Cardiovascular:      Rate and Rhythm: Normal rate.      Pulses: Normal pulses.   Pulmonary:      Effort: Pulmonary effort is normal.   Abdominal:      General: There is distension.      Tenderness: There is no abdominal tenderness.   Neurological:      Mental Status: He is alert and oriented to person, place, and time.             Significant Labs: All pertinent labs within the past 24 hours have been reviewed.    Significant Imaging: I have reviewed all pertinent imaging results/findings within the past 24 hours.    Assessment/Plan:       * Alcoholic cirrhosis of liver with ascites  Decompensated with ascites, hepatorenal syndrome and hepatic encephalopathy and GI bleeding    MELD-Na score calculated; MELD 3.0: 38 at 9/5/2024  8:41 AM  MELD-Na: 36 at 9/5/2024  8:41 AM  Calculated from:  Serum Creatinine: 3.1 mg/dL (Using max of 3 mg/dL) at 9/5/2024  8:41 AM  Serum Sodium: 137 mmol/L at 9/5/2024  8:41 AM  Total Bilirubin: 23.1 mg/dL at 9/5/2024  8:41 AM  Serum Albumin: 3.1 g/dL at 9/5/2024  8:41 AM  INR(ratio): 1.9 at 9/4/2024  2:38 AM  Age at listing (hypothetical): 44 years  Sex: Male at 9/5/2024  8:41 AM    Continue chronic meds. Etiology likely ETOH. Will avoid any hepatotoxic meds, and monitor CBC/CMP/INR for synthetic function.     Patient underwent abdominal paracentesis by Interventional Radiology with 5 L clear straw ascitic fluid on September 3, 2024.  Continue SBP prophylaxis.  Changed Rocephin to oral ciprofloxacin.  Cont Lactulose     Prognosis is very poor. Palliative care consulted.     Alcoholic hepatitis with ascites  S/p paracentesis 9/3 5 L removed   Diuresis per Nephrology  Likely will need another paracentesis   Cont prednisone for hepatitis       REMI (acute kidney injury)  REMI is likely due to  hepatorenal syndrome . Baseline creatinine is unknown. Most recent creatinine and eGFR are listed below.  Recent Labs     09/04/24  0238 09/05/24  0841   CREATININE 3.5* 3.1*   EGFRNORACEVR 21.2* 24.5*       - Nephrology following, specific treatment for HRS, including Sandostatin and albumin  - Avoid nephrotoxins and renally dose meds for GFR listed above  - Monitor urine output, serial BMP, and adjust therapy as needed    Hepatic encephalopathy  Maintain patient on lactulose to ensure 3-4 bowel movements daily.  Monitor cognitive functions and level of alertness.    Rectal bleeding  See above     Seizure disorder  Continue Keppra      ABLA (acute blood loss anemia)  Secondary to acute on chronic GI blood loss.  Anemia is likely due  to acute blood loss.   Flex sigmoidoscopy 9/3:1cm ulceration above dentate line w/ erythema and slight ooze with manipulation. No interventions performed.      Most recent hemoglobin and hematocrit are listed below.  Recent Labs     09/04/24  0238 09/04/24  2241 09/05/24  0841   HGB 8.4* 9.2* 10.1*   HCT 24.5* 27.5* 30.1*     Stable   Patient was transfused 2 units 9/3  Patient is on octreotide infusion ? 72 hrs       VTE Risk Mitigation (From admission, onward)           Ordered     Place sequential compression device  Until discontinued         09/02/24 2045     IP VTE LOW RISK PATIENT  Once         09/02/24 2045                    Discharge Planning   ED: 9/9/2024     Code Status: Full Code   Is the patient medically ready for discharge?:     Reason for patient still in hospital (select all that apply): Patient trending condition  Discharge Plan A: Home with family                  Wilbert Lopez DO  Department of Hospital Medicine   [facility]

## 2024-09-08 LAB
ALBUMIN SERPL BCP-MCNC: 3.3 G/DL (ref 3.5–5.2)
ALP SERPL-CCNC: 120 U/L (ref 55–135)
ALT SERPL W/O P-5'-P-CCNC: 35 U/L (ref 10–44)
AMMONIA PLAS-SCNC: 52 UMOL/L (ref 10–50)
ANION GAP SERPL CALC-SCNC: 7 MMOL/L (ref 8–16)
AST SERPL-CCNC: 46 U/L (ref 10–40)
BACTERIA BLD CULT: NORMAL
BASOPHILS # BLD AUTO: 0.05 K/UL (ref 0–0.2)
BASOPHILS NFR BLD: 0.1 % (ref 0–1.9)
BILIRUB SERPL-MCNC: 22.3 MG/DL (ref 0.1–1)
BUN SERPL-MCNC: 42 MG/DL (ref 6–20)
CALCIUM SERPL-MCNC: 8.7 MG/DL (ref 8.7–10.5)
CHLORIDE SERPL-SCNC: 107 MMOL/L (ref 95–110)
CO2 SERPL-SCNC: 21 MMOL/L (ref 23–29)
CREAT SERPL-MCNC: 2.7 MG/DL (ref 0.5–1.4)
DIFFERENTIAL METHOD BLD: ABNORMAL
EOSINOPHIL # BLD AUTO: 0 K/UL (ref 0–0.5)
EOSINOPHIL NFR BLD: 0.1 % (ref 0–8)
ERYTHROCYTE [DISTWIDTH] IN BLOOD BY AUTOMATED COUNT: 20.8 % (ref 11.5–14.5)
EST. GFR  (NO RACE VARIABLE): 28.9 ML/MIN/1.73 M^2
GLUCOSE SERPL-MCNC: 147 MG/DL (ref 70–110)
HCT VFR BLD AUTO: 27 % (ref 40–54)
HGB BLD-MCNC: 8.3 G/DL (ref 14–18)
IMM GRANULOCYTES # BLD AUTO: 0.52 K/UL (ref 0–0.04)
IMM GRANULOCYTES NFR BLD AUTO: 1.5 % (ref 0–0.5)
INR PPP: 1.9 (ref 0.8–1.2)
LYMPHOCYTES # BLD AUTO: 0.9 K/UL (ref 1–4.8)
LYMPHOCYTES NFR BLD: 2.5 % (ref 18–48)
MCH RBC QN AUTO: 30.9 PG (ref 27–31)
MCHC RBC AUTO-ENTMCNC: 30.7 G/DL (ref 32–36)
MCV RBC AUTO: 100 FL (ref 82–98)
MONOCYTES # BLD AUTO: 1.5 K/UL (ref 0.3–1)
MONOCYTES NFR BLD: 4.3 % (ref 4–15)
NEUTROPHILS # BLD AUTO: 32.5 K/UL (ref 1.8–7.7)
NEUTROPHILS NFR BLD: 91.5 % (ref 38–73)
NRBC BLD-RTO: 0 /100 WBC
PLATELET # BLD AUTO: 79 K/UL (ref 150–450)
PLATELET BLD QL SMEAR: ABNORMAL
PMV BLD AUTO: 12.2 FL (ref 9.2–12.9)
POTASSIUM SERPL-SCNC: 4.4 MMOL/L (ref 3.5–5.1)
PROT SERPL-MCNC: 4.5 G/DL (ref 6–8.4)
PROTHROMBIN TIME: 20.5 SEC (ref 9–12.5)
RBC # BLD AUTO: 2.69 M/UL (ref 4.6–6.2)
SODIUM SERPL-SCNC: 135 MMOL/L (ref 136–145)
WBC # BLD AUTO: 34.48 K/UL (ref 3.9–12.7)

## 2024-09-08 PROCEDURE — 12000002 HC ACUTE/MED SURGE SEMI-PRIVATE ROOM

## 2024-09-08 PROCEDURE — A4216 STERILE WATER/SALINE, 10 ML: HCPCS | Performed by: INTERNAL MEDICINE

## 2024-09-08 PROCEDURE — 63600175 PHARM REV CODE 636 W HCPCS: Performed by: STUDENT IN AN ORGANIZED HEALTH CARE EDUCATION/TRAINING PROGRAM

## 2024-09-08 PROCEDURE — 82140 ASSAY OF AMMONIA: CPT | Performed by: FAMILY MEDICINE

## 2024-09-08 PROCEDURE — 63600175 PHARM REV CODE 636 W HCPCS: Mod: JA | Performed by: INTERNAL MEDICINE

## 2024-09-08 PROCEDURE — 25000003 PHARM REV CODE 250: Performed by: INTERNAL MEDICINE

## 2024-09-08 PROCEDURE — 80053 COMPREHEN METABOLIC PANEL: CPT | Performed by: STUDENT IN AN ORGANIZED HEALTH CARE EDUCATION/TRAINING PROGRAM

## 2024-09-08 PROCEDURE — P9047 ALBUMIN (HUMAN), 25%, 50ML: HCPCS | Mod: JZ,JG | Performed by: STUDENT IN AN ORGANIZED HEALTH CARE EDUCATION/TRAINING PROGRAM

## 2024-09-08 PROCEDURE — 85610 PROTHROMBIN TIME: CPT | Performed by: STUDENT IN AN ORGANIZED HEALTH CARE EDUCATION/TRAINING PROGRAM

## 2024-09-08 PROCEDURE — 25000003 PHARM REV CODE 250: Performed by: HOSPITALIST

## 2024-09-08 PROCEDURE — 25000003 PHARM REV CODE 250: Performed by: STUDENT IN AN ORGANIZED HEALTH CARE EDUCATION/TRAINING PROGRAM

## 2024-09-08 PROCEDURE — 85025 COMPLETE CBC W/AUTO DIFF WBC: CPT | Performed by: STUDENT IN AN ORGANIZED HEALTH CARE EDUCATION/TRAINING PROGRAM

## 2024-09-08 PROCEDURE — 36415 COLL VENOUS BLD VENIPUNCTURE: CPT | Performed by: STUDENT IN AN ORGANIZED HEALTH CARE EDUCATION/TRAINING PROGRAM

## 2024-09-08 RX ORDER — SODIUM BICARBONATE 650 MG/1
650 TABLET ORAL 4 TIMES DAILY
Status: DISCONTINUED | OUTPATIENT
Start: 2024-09-08 | End: 2024-09-10 | Stop reason: HOSPADM

## 2024-09-08 RX ADMIN — PREDNISONE 40 MG: 20 TABLET ORAL at 09:09

## 2024-09-08 RX ADMIN — THIAMINE HCL TAB 100 MG 100 MG: 100 TAB at 09:09

## 2024-09-08 RX ADMIN — SODIUM CHLORIDE, PRESERVATIVE FREE 10 ML: 5 INJECTION INTRAVENOUS at 11:09

## 2024-09-08 RX ADMIN — LACTULOSE 20 G: 20 SOLUTION ORAL at 09:09

## 2024-09-08 RX ADMIN — ALBUMIN (HUMAN) 25 G: 0.25 INJECTION, SOLUTION INTRAVENOUS at 09:09

## 2024-09-08 RX ADMIN — OCTREOTIDE ACETATE 50 MCG/HR: 500 INJECTION, SOLUTION INTRAVENOUS; SUBCUTANEOUS at 10:09

## 2024-09-08 RX ADMIN — SODIUM CHLORIDE, PRESERVATIVE FREE 10 ML: 5 INJECTION INTRAVENOUS at 01:09

## 2024-09-08 RX ADMIN — LEVETIRACETAM 500 MG: 500 TABLET, FILM COATED ORAL at 09:09

## 2024-09-08 RX ADMIN — SODIUM CHLORIDE, PRESERVATIVE FREE 10 ML: 5 INJECTION INTRAVENOUS at 05:09

## 2024-09-08 RX ADMIN — CIPROFLOXACIN HYDROCHLORIDE 500 MG: 500 TABLET, FILM COATED ORAL at 09:09

## 2024-09-08 RX ADMIN — FOLIC ACID 1 MG: 1 TABLET ORAL at 09:09

## 2024-09-08 RX ADMIN — SODIUM BICARBONATE 650 MG TABLET 650 MG: at 12:09

## 2024-09-08 RX ADMIN — SODIUM BICARBONATE 650 MG TABLET 650 MG: at 09:09

## 2024-09-08 RX ADMIN — OCTREOTIDE ACETATE 50 MCG/HR: 500 INJECTION, SOLUTION INTRAVENOUS; SUBCUTANEOUS at 12:09

## 2024-09-08 RX ADMIN — OXYCODONE HYDROCHLORIDE 5 MG: 5 TABLET ORAL at 04:09

## 2024-09-08 RX ADMIN — SODIUM CHLORIDE, PRESERVATIVE FREE 10 ML: 5 INJECTION INTRAVENOUS at 06:09

## 2024-09-08 RX ADMIN — SODIUM BICARBONATE 650 MG TABLET 650 MG: at 04:09

## 2024-09-08 NOTE — PROGRESS NOTES
"INPATIENT NEPHROLOGY Progress Note  [facility] NEPHROLOGY    Washington Glasgow  09/08/2024    Reason for consultation:    Acute kidney injury    Chief Complaint:   Chief Complaint   Patient presents with    Jaundice    Rectal Bleeding          History of Present Illness:    Per H and P  Patient is a 44-year-old male with history of alcoholic liver cirrhosis, last alcoholic drink a month ago, lower GI bleed, anemia, seizure disorder, chronic kidney disease presents to the emergency room with complaints of abdominal distention and rectal bleeding. Patient was recently at [facility] on 08/28 for GI bleed at that time, received blood transfusion. Underwent a colonoscopy, patient and significant other unsure if anything was found. They report that the cause of the rectal bleeding is still unknown, reports that "banding" was done. Patient left AMA. Significant other reports there were discussions about undergoing tips procedure, this is still pending. Patient follows with Gastroenterology outpatient. Patient reports ongoing on and off rectal bleeding, and worsening abdominal distention-primary reason he came here. Patient denies fever, chills, nausea, vomiting, chest pain, shortness of breath. Review of labs on 08/29 showed WBC 31, hemoglobin 6.9, creatinine 4.97, bilirubin 21. On this admission WBC 36.13, hemoglobin 8.1, creatinine 4, bilirubin 22.9. Patient reports also following with Nephrology outpatient. Patient transferred from [facility] for paracentesis     9/4  AFVSS, output not measured due to pt refusing to go in the urinal, no cp, sob, nausea.  More alert today.  Refused fernandez placement.  Colonoscopy limited due to stool.  1cm ulcer above dentate line seen.    9/5 refusing lactulose, with AMS, s/p 5L para neg for SBP, transfused 2u of blood, VSS, on RA, UOP 600cc +  voids- WBC rising, Hb better, renal function slightly better  9/6  VSS.  Labs not yet collected, pt is a difficult stick, no IV site " currently either.  Nurse planning to try to get blood for labs with IV attempt if possible.  No new complaints.  9/7  appreciate palliative care input.  VSS, UOP not recorded.  Renal function improving, liver enzymes trending down.  Updated pt/mother at bedside.  9/8  Scr trended down some.  UOP not recorded.  Getting IV albumin, octreotide gtt.  C/o being sleepy, no other complaints.  Mother at bedside, updated.    Plan of Care:     REMI- concern for HRS  Possible underlying CKD  ETOH cirrhosis with ascites and HE  Hyponatremia  Acidosis  Acute GIB    - SCr is improving- continue treatment for HRS- switched octreotide SQ to gtt- no nsaids or IV contrast- dose meds for CrCl < 30 - no acute RRT needs  - imaging unrevealing- check phos-2.7, PTH - 91.3 to evaluate for underlying CKD-  UA trace protein, no blood- UPCR 0.4   - s/p para- 5L- may need another in a few days- no SBP- refusing lactulose- will inquire why on prednisone--alcoholic hepatitis mgt per primary notes  - add 1.5L fluid restriction  - added sod bicarb 650mg BID, improving  - trend H/H. GI eval done    Thank you for allowing us to participate in this patient's care. We will continue to follow.    Vital Signs:  Temp Readings from Last 3 Encounters:   09/08/24 98.4 °F (36.9 °C)       Pulse Readings from Last 3 Encounters:   09/08/24 64   09/03/24 66       BP Readings from Last 3 Encounters:   09/08/24 (!) 138/90   09/03/24 109/75       Weight:  Wt Readings from Last 3 Encounters:   09/04/24 81.4 kg (179 lb 7.3 oz)   Medications:  No current facility-administered medications on file prior to encounter.     Current Outpatient Medications on File Prior to Encounter   Medication Sig Dispense Refill    folic acid (FOLVITE) 1 MG tablet Take 1,000 mcg by mouth once daily.      levETIRAcetam (KEPPRA) 500 MG Tab Take 500 mg by mouth 2 (two) times daily.      predniSONE (DELTASONE) 20 MG tablet Take 40 mg by mouth once daily.      PROTONIX 40 mg tablet Take 40 mg  "by mouth 2 (two) times daily.      sodium bicarbonate 650 MG tablet Take 650 mg by mouth 3 (three) times daily.      thiamine mononitrate, vit B1, (VITAMIN B-1, MONONITRATE,) 100 mg Tab Take 100 mg by mouth once daily.       Scheduled Meds:   albumin human 25%  25 g Intravenous BID    ciprofloxacin HCl  500 mg Oral Q12H    folic acid  1 mg Oral Daily    lactulose  20 g Oral TID    levETIRAcetam  500 mg Oral BID    midodrine  10 mg Oral Q8H    predniSONE  40 mg Oral Daily    sodium bicarbonate  650 mg Oral TID    sodium chloride 0.9%  10 mL Intravenous Q6H    thiamine  100 mg Oral Daily     Continuous Infusions:   octreotide (SANDOSTATIN) 500 mcg in 0.9% NaCl 100 mL infusion  50 mcg/hr Intravenous Continuous 10 mL/hr at 09/07/24 0221 50 mcg/hr at 09/07/24 0221     PRN Meds:.  Current Facility-Administered Medications:     ALPRAZolam, 0.5 mg, Oral, TID PRN    magnesium oxide, 800 mg, Oral, PRN    magnesium oxide, 800 mg, Oral, PRN    ondansetron, 4 mg, Intravenous, Q6H PRN    oxyCODONE, 5 mg, Oral, Q4H PRN    potassium bicarbonate, 35 mEq, Oral, PRN    potassium bicarbonate, 50 mEq, Oral, PRN    potassium bicarbonate, 60 mEq, Oral, PRN    potassium, sodium phosphates, 2 packet, Oral, PRN    potassium, sodium phosphates, 2 packet, Oral, PRN    potassium, sodium phosphates, 2 packet, Oral, PRN    Flushing PICC/Midline Protocol, , , Until Discontinued **AND** sodium chloride 0.9%, 10 mL, Intravenous, Q6H **AND** sodium chloride 0.9%, 10 mL, Intravenous, PRN    Review of Systems:  Neg    Physical Exam:    BP (!) 138/90   Pulse 64   Temp 98.4 °F (36.9 °C)   Resp 17   Ht 6' 1" (1.854 m)   Wt 81.4 kg (179 lb 7.3 oz)   SpO2 100%   BMI 23.68 kg/m²     General Appearance:    Ill appearing   Head:    Normocephalic, without obvious abnormality, atraumatic   Eyes:    PER, conjunctiva/corneas clear, EOM's intact in both eyes        Throat:   Lips, mucosa, and tongue normal; teeth and gums normal   Back:     Symmetric, no " curvature, ROM normal, no CVA tenderness   Lungs:     Clear to auscultation bilaterally, respirations unlabored   Chest wall:    No tenderness or deformity   Heart:    Regular rate and rhythm, S1 and S2 normal, no murmur, rub   or gallop   Abdomen:     Mild tenderness, very distended with ascites   Extremities:   Extremities normal, atraumatic, no cyanosis or edema   Pulses:   2+ and symmetric all extremities   MSK:   No joint or muscle swelling, tenderness or deformity   Skin:   Skin color, texture, turgor normal, no rashes or lesions   Neurologic:   No flap, poor insight     Results:  Lab Results   Component Value Date     (L) 09/08/2024    K 4.4 09/08/2024     09/08/2024    CO2 21 (L) 09/08/2024    BUN 42 (H) 09/08/2024    CREATININE 2.7 (H) 09/08/2024    CALCIUM 8.7 09/08/2024    ANIONGAP 7 (L) 09/08/2024    ESTGFRAFRICA >60 01/17/2022    EGFRNONAA >60 01/17/2022       Lab Results   Component Value Date    CALCIUM 8.7 09/08/2024    PHOS 2.7 09/05/2024       Recent Labs   Lab 09/08/24  0728   WBC 34.48*   RBC 2.69*   HGB 8.3*   HCT 27.0*   PLT 79*   *   MCH 30.9   MCHC 30.7*        Imaging Results              US Abdomen Limited (Final result)  Result time 09/02/24 16:47:38   Procedure changed from US Abdomen Complete     Final result by Reva Méndez MD (09/02/24 16:47:38)                   Impression:      As above      Electronically signed by: Reva Méndez MD  Date:    09/02/2024  Time:    16:47               Narrative:    EXAMINATION:  US ABDOMEN LIMITED    CLINICAL HISTORY:  ascites; Other ascites    TECHNIQUE:  Limited ultrasound of the abdomen for ascites    COMPARISON:  None.    FINDINGS:  There is ascites seen in all 4 quadrants with moderately large amount ascites seen.  .                                       CT Abdomen Pelvis  Without Contrast (Final result)  Result time 09/02/24 16:44:36      Final result by Reva Méndez MD (09/02/24 16:44:36)                    Impression:      Features of cirrhosis with ascites and nodular contour of the liver.  Mild diffuse bladder wall thickening versus incomplete distention recommend correlation clinically if clinical consideration for cystitis or chronic bladder outlet obstruction.  Diverticulosis without CT findings of acute diverticulitis.  Findings detailed above.      Electronically signed by: Reva Méndez MD  Date:    09/02/2024  Time:    16:44               Narrative:    EXAMINATION:  CT ABDOMEN PELVIS WITHOUT CONTRAST    CLINICAL HISTORY:  abdominal distention;    TECHNIQUE:  Low dose axial images, sagittal and coronal reformations were obtained from the lung bases to the pubic symphysis.  No p.o. contrast    COMPARISON:  None    FINDINGS:  Mild dependent hypoventilatory changes in visualized lung bases    Large volume of ascites.  Nodular contour the liver suggesting cirrhosis.  Suggestion of recanalization of the umbilical vein.  Spleen not enlarged. Splenule noted. Adrenal glands; slight diffuse thickening.    Pancreas unremarkable    Abdominal aorta no aneurysm    No calcified stones the gallbladder or CT findings of acute cholecystitis or biliary duct dilatation    Diverticulosis without CT findings of acute diverticulitis. Appendix; normal appearing appendix is thought to be visualized.    Kidney; no hydronephrosis or opaque renal stone.  No ureteral dilatation, opaque ureteral stone or ureteral obstruction evident.  The urinary bladder is mildly distended at time of the exam. Wall appears mildly diffusely thickened recommend correlation clinically if clinical consideration for cystitis or chronic bladder outlet obstruction.  The prostate gland does not appear enlarged. There is small left inguinal hernia containing ascites and fat.    Subcutaneous edema.                                    Patient care was time spent personally by me on the following activities: >35 min  Obtaining a history  Examination of  patient.  Providing medical care at the patients bedside.  Developing a treatment plan with patient or surrogate and bedside caregivers  Ordering and reviewing laboratory studies, radiographic studies, pulse oximetry.  Ordering and performing treatments and interventions.  Evaluation of patient's response to treatment.  Discussions with consultants while on the unit and immediately available to the patient.  Re-evaluation of the patient's condition.  Documentation in the medical record.     Asha Davis NP    [facility]  [phone]

## 2024-09-08 NOTE — PROGRESS NOTES
"[facility] Medicine  Progress Note    Patient Name: Washington Glasgow  MRN: [id number]  Patient Class: IP- Inpatient   Admission Date: 9/2/2024  Length of Stay: 6 days  Attending Physician: Wilbert Lopez DO  Primary Care Provider: Sahil Gallegos NP        Subjective:     Principal Problem:Alcoholic cirrhosis of liver with ascites        HPI:  Patient is a 44-year-old male with history of alcoholic liver cirrhosis, last alcoholic drink a month ago, lower GI bleed, anemia, seizure disorder, chronic kidney disease presents to the emergency room with complaints of abdominal distention and rectal bleeding.  Patient was recently at [facility] on 08/28 for GI bleed at that time, received blood transfusion.  Underwent a colonoscopy, patient and significant other unsure if anything was found.  They report that the cause of the rectal bleeding is still unknown, reports that "banding" was done.  Patient left AMA.  Significant other reports there were discussions about undergoing tips procedure, this is still pending.  Patient follows with Gastroenterology outpatient.  Patient reports ongoing on and off rectal bleeding, and worsening abdominal distention-primary reason he came here.  Patient denies fever, chills, nausea, vomiting, chest pain, shortness of breath.  Review of labs on 08/29 showed WBC 31, hemoglobin 6.9, creatinine 4.97, bilirubin 21.  On this admission WBC 36.13, hemoglobin 8.1, creatinine 4, bilirubin 22.9.  Patient reports also following with Nephrology outpatient.  Patient transferred from [facility] for paracentesis.  Gastroenterology and Nephrology consulted.       Overview/Hospital Course:  Patient admitted to intensive care unit.  Hemoglobin and hematocrit closely monitored.  Patient received 2 units of packed red blood cells.  Gastroenterology and nephrology team consulted.  Patient maintained on midodrine, octreotide and albumin therapy.  Patient underwent abdominal " paracentesis with 5 L ascitic straw-colored fluid removal.  Patient maintained on intravenous ceftriaxone prophylaxis.  Microbiology closely followed.  Patient maintained on lactulose.  Serum ammonia trended.  Patient maintained on prednisone therapy for alcoholic hepatitis management.  Patient was evaluated by GI specialist who performed flexible sigmoidoscopy revealing a linear ulceration at dentate line.  Patient has been noncompliant with use of lactulose and has refused Macias catheter placement during hospital stay.  Multiple conversations regarding compliance with lactulose were conducted with patient and his mother. Palliative care was also consulted.  GI recommended octreotide until 09/09/2024.    Interval History: Patient said he feels better    Review of Systems   All other systems reviewed and are negative.    Objective:     Vital Signs (Most Recent):  Temp: 98.2 °F (36.8 °C) (09/08/24 1235)  Pulse: 75 (09/08/24 1235)  Resp: 16 (09/08/24 1235)  BP: (!) 140/94 (09/08/24 1235)  SpO2: 98 % (09/08/24 1235) Vital Signs (24h Range):  Temp:  [98.2 °F (36.8 °C)-98.7 °F (37.1 °C)] 98.2 °F (36.8 °C)  Pulse:  [64-80] 75  Resp:  [16-18] 16  SpO2:  [98 %-100 %] 98 %  BP: (138-158)/(81-94) 140/94     Weight: 81.4 kg (179 lb 7.3 oz)  Body mass index is 23.68 kg/m².    Intake/Output Summary (Last 24 hours) at 9/8/2024 1620  Last data filed at 9/8/2024 1236  Gross per 24 hour   Intake 480 ml   Output --   Net 480 ml         Physical Exam  Cardiovascular:      Rate and Rhythm: Normal rate.      Pulses: Normal pulses.   Pulmonary:      Effort: Pulmonary effort is normal.   Abdominal:      General: There is distension.      Tenderness: There is no abdominal tenderness.   Neurological:      Mental Status: He is alert and oriented to person, place, and time.             Significant Labs: All pertinent labs within the past 24 hours have been reviewed.    Significant Imaging: I have reviewed all pertinent imaging  results/findings within the past 24 hours.    Assessment/Plan:      * Alcoholic cirrhosis of liver with ascites  Decompensated with ascites, hepatorenal syndrome and hepatic encephalopathy and GI bleeding    MELD-Na score calculated; MELD 3.0: 38 at 9/5/2024  8:41 AM  MELD-Na: 36 at 9/5/2024  8:41 AM  Calculated from:  Serum Creatinine: 3.1 mg/dL (Using max of 3 mg/dL) at 9/5/2024  8:41 AM  Serum Sodium: 137 mmol/L at 9/5/2024  8:41 AM  Total Bilirubin: 23.1 mg/dL at 9/5/2024  8:41 AM  Serum Albumin: 3.1 g/dL at 9/5/2024  8:41 AM  INR(ratio): 1.9 at 9/4/2024  2:38 AM  Age at listing (hypothetical): 44 years  Sex: Male at 9/5/2024  8:41 AM    Continue chronic meds. Etiology likely ETOH. Will avoid any hepatotoxic meds, and monitor CBC/CMP/INR for synthetic function.     Patient underwent abdominal paracentesis by Interventional Radiology with 5 L clear straw ascitic fluid on September 3, 2024.  Continue SBP prophylaxis.  Changed Rocephin to oral ciprofloxacin.  Cont Lactulose     Prognosis is very poor. Palliative care consulted.     Alcoholic hepatitis with ascites  S/p paracentesis 9/3 5 L removed   Diuresis per Nephrology  Likely will need another paracentesis   Cont prednisone for hepatitis       REMI (acute kidney injury)  REMI is likely due to  hepatorenal syndrome . Baseline creatinine is unknown. Most recent creatinine and eGFR are listed below.  Recent Labs     09/04/24  0238 09/05/24  0841   CREATININE 3.5* 3.1*   EGFRNORACEVR 21.2* 24.5*       - Nephrology following, specific treatment for HRS, including Sandostatin and albumin  - Avoid nephrotoxins and renally dose meds for GFR listed above  - Monitor urine output, serial BMP, and adjust therapy as needed    Hepatic encephalopathy  Maintain patient on lactulose to ensure 3-4 bowel movements daily.  Monitor cognitive functions and level of alertness.    Rectal bleeding  GI recommended octreotide till 09/09/2024    Seizure disorder  Continue Keppra      ABLA  (acute blood loss anemia)  Secondary to acute on chronic GI blood loss.  Anemia is likely due to acute blood loss.   Flex sigmoidoscopy 9/3:1cm ulceration above dentate line w/ erythema and slight ooze with manipulation. No interventions performed.      Most recent hemoglobin and hematocrit are listed below.  Recent Labs     09/04/24  0238 09/04/24  2241 09/05/24  0841   HGB 8.4* 9.2* 10.1*   HCT 24.5* 27.5* 30.1*     Stable   Patient was transfused 2 units 9/3  Patient is on octreotide infusion ? 72 hrs       VTE Risk Mitigation (From admission, onward)           Ordered     Place sequential compression device  Until discontinued         09/02/24 2045     IP VTE LOW RISK PATIENT  Once         09/02/24 2045                    Discharge Planning   ED: 9/9/2024     Code Status: Full Code   Is the patient medically ready for discharge?:     Reason for patient still in hospital (select all that apply): Patient trending condition  Discharge Plan A: Home with family                  Wilbert Lopez DO  Department of Hospital Medicine   [facility]

## 2024-09-08 NOTE — SUBJECTIVE & OBJECTIVE
Interval History: Patient said he feels better    Review of Systems   All other systems reviewed and are negative.    Objective:     Vital Signs (Most Recent):  Temp: 98.2 °F (36.8 °C) (09/08/24 1235)  Pulse: 75 (09/08/24 1235)  Resp: 16 (09/08/24 1235)  BP: (!) 140/94 (09/08/24 1235)  SpO2: 98 % (09/08/24 1235) Vital Signs (24h Range):  Temp:  [98.2 °F (36.8 °C)-98.7 °F (37.1 °C)] 98.2 °F (36.8 °C)  Pulse:  [64-80] 75  Resp:  [16-18] 16  SpO2:  [98 %-100 %] 98 %  BP: (138-158)/(81-94) 140/94     Weight: 81.4 kg (179 lb 7.3 oz)  Body mass index is 23.68 kg/m².    Intake/Output Summary (Last 24 hours) at 9/8/2024 1620  Last data filed at 9/8/2024 1236  Gross per 24 hour   Intake 480 ml   Output --   Net 480 ml         Physical Exam  Cardiovascular:      Rate and Rhythm: Normal rate.      Pulses: Normal pulses.   Pulmonary:      Effort: Pulmonary effort is normal.   Abdominal:      General: There is distension.      Tenderness: There is no abdominal tenderness.   Neurological:      Mental Status: He is alert and oriented to person, place, and time.             Significant Labs: All pertinent labs within the past 24 hours have been reviewed.    Significant Imaging: I have reviewed all pertinent imaging results/findings within the past 24 hours.

## 2024-09-08 NOTE — PLAN OF CARE
Problem: Adult Inpatient Plan of Care  Goal: Plan of Care Review  9/8/2024 1739 by Aziza Srinivasan, RN  Outcome: Met  9/8/2024 1501 by Aziza Srinivasan, RN  Outcome: Progressing  Goal: Optimal Comfort and Wellbeing  Outcome: Met     Problem: Fall Injury Risk  Goal: Absence of Fall and Fall-Related Injury  9/8/2024 1739 by Aziza Srinivasan, RN  Outcome: Met  9/8/2024 1501 by Aziza Srinivasan, RN  Outcome: Progressing

## 2024-09-09 LAB
ALBUMIN SERPL BCP-MCNC: 3.7 G/DL (ref 3.5–5.2)
ALP SERPL-CCNC: 133 U/L (ref 55–135)
ALT SERPL W/O P-5'-P-CCNC: 36 U/L (ref 10–44)
ANION GAP SERPL CALC-SCNC: 10 MMOL/L (ref 8–16)
AST SERPL-CCNC: 49 U/L (ref 10–40)
BASOPHILS # BLD AUTO: 0.04 K/UL (ref 0–0.2)
BASOPHILS NFR BLD: 0.1 % (ref 0–1.9)
BILIRUB SERPL-MCNC: 23.8 MG/DL (ref 0.1–1)
BUN SERPL-MCNC: 44 MG/DL (ref 6–20)
CALCIUM SERPL-MCNC: 8.7 MG/DL (ref 8.7–10.5)
CHLORIDE SERPL-SCNC: 105 MMOL/L (ref 95–110)
CO2 SERPL-SCNC: 20 MMOL/L (ref 23–29)
CREAT SERPL-MCNC: 2.5 MG/DL (ref 0.5–1.4)
DIFFERENTIAL METHOD BLD: ABNORMAL
EOSINOPHIL # BLD AUTO: 0 K/UL (ref 0–0.5)
EOSINOPHIL NFR BLD: 0 % (ref 0–8)
ERYTHROCYTE [DISTWIDTH] IN BLOOD BY AUTOMATED COUNT: 20.2 % (ref 11.5–14.5)
EST. GFR  (NO RACE VARIABLE): 31.7 ML/MIN/1.73 M^2
GLUCOSE SERPL-MCNC: 142 MG/DL (ref 70–110)
HCT VFR BLD AUTO: 26.6 % (ref 40–54)
HGB BLD-MCNC: 8.6 G/DL (ref 14–18)
IMM GRANULOCYTES # BLD AUTO: 0.39 K/UL (ref 0–0.04)
IMM GRANULOCYTES NFR BLD AUTO: 1.1 % (ref 0–0.5)
INR PPP: 1.8 (ref 0.8–1.2)
LYMPHOCYTES # BLD AUTO: 0.6 K/UL (ref 1–4.8)
LYMPHOCYTES NFR BLD: 1.8 % (ref 18–48)
MCH RBC QN AUTO: 31.5 PG (ref 27–31)
MCHC RBC AUTO-ENTMCNC: 32.3 G/DL (ref 32–36)
MCV RBC AUTO: 97 FL (ref 82–98)
MONOCYTES # BLD AUTO: 1.3 K/UL (ref 0.3–1)
MONOCYTES NFR BLD: 3.7 % (ref 4–15)
NEUTROPHILS # BLD AUTO: 31.7 K/UL (ref 1.8–7.7)
NEUTROPHILS NFR BLD: 93.3 % (ref 38–73)
NRBC BLD-RTO: 0 /100 WBC
PLATELET # BLD AUTO: 79 K/UL (ref 150–450)
PMV BLD AUTO: 12.3 FL (ref 9.2–12.9)
POTASSIUM SERPL-SCNC: 4.3 MMOL/L (ref 3.5–5.1)
PROT SERPL-MCNC: 4.9 G/DL (ref 6–8.4)
PROTHROMBIN TIME: 19.5 SEC (ref 9–12.5)
RBC # BLD AUTO: 2.73 M/UL (ref 4.6–6.2)
SODIUM SERPL-SCNC: 135 MMOL/L (ref 136–145)
WBC # BLD AUTO: 34.05 K/UL (ref 3.9–12.7)

## 2024-09-09 PROCEDURE — P9047 ALBUMIN (HUMAN), 25%, 50ML: HCPCS | Mod: JZ,JG | Performed by: STUDENT IN AN ORGANIZED HEALTH CARE EDUCATION/TRAINING PROGRAM

## 2024-09-09 PROCEDURE — 85610 PROTHROMBIN TIME: CPT | Performed by: STUDENT IN AN ORGANIZED HEALTH CARE EDUCATION/TRAINING PROGRAM

## 2024-09-09 PROCEDURE — A4216 STERILE WATER/SALINE, 10 ML: HCPCS | Performed by: INTERNAL MEDICINE

## 2024-09-09 PROCEDURE — 25000003 PHARM REV CODE 250: Performed by: RADIOLOGY

## 2024-09-09 PROCEDURE — 25000003 PHARM REV CODE 250: Performed by: INTERNAL MEDICINE

## 2024-09-09 PROCEDURE — 12000002 HC ACUTE/MED SURGE SEMI-PRIVATE ROOM

## 2024-09-09 PROCEDURE — BW40ZZZ ULTRASONOGRAPHY OF ABDOMEN: ICD-10-PCS | Performed by: RADIOLOGY

## 2024-09-09 PROCEDURE — 25000003 PHARM REV CODE 250: Performed by: STUDENT IN AN ORGANIZED HEALTH CARE EDUCATION/TRAINING PROGRAM

## 2024-09-09 PROCEDURE — 0W9G3ZZ DRAINAGE OF PERITONEAL CAVITY, PERCUTANEOUS APPROACH: ICD-10-PCS | Performed by: RADIOLOGY

## 2024-09-09 PROCEDURE — 63600175 PHARM REV CODE 636 W HCPCS: Mod: JA | Performed by: INTERNAL MEDICINE

## 2024-09-09 PROCEDURE — 80053 COMPREHEN METABOLIC PANEL: CPT | Performed by: STUDENT IN AN ORGANIZED HEALTH CARE EDUCATION/TRAINING PROGRAM

## 2024-09-09 PROCEDURE — 85025 COMPLETE CBC W/AUTO DIFF WBC: CPT | Performed by: STUDENT IN AN ORGANIZED HEALTH CARE EDUCATION/TRAINING PROGRAM

## 2024-09-09 PROCEDURE — 25000003 PHARM REV CODE 250: Performed by: HOSPITALIST

## 2024-09-09 PROCEDURE — 36415 COLL VENOUS BLD VENIPUNCTURE: CPT | Performed by: STUDENT IN AN ORGANIZED HEALTH CARE EDUCATION/TRAINING PROGRAM

## 2024-09-09 PROCEDURE — 63600175 PHARM REV CODE 636 W HCPCS: Mod: JZ,JG | Performed by: STUDENT IN AN ORGANIZED HEALTH CARE EDUCATION/TRAINING PROGRAM

## 2024-09-09 RX ORDER — MIDODRINE HYDROCHLORIDE 10 MG/1
10 TABLET ORAL EVERY 8 HOURS
Qty: 90 TABLET | Refills: 0 | Status: SHIPPED | OUTPATIENT
Start: 2024-09-09 | End: 2024-10-10

## 2024-09-09 RX ORDER — SPIRONOLACTONE 25 MG/1
25 TABLET ORAL DAILY
Status: DISCONTINUED | OUTPATIENT
Start: 2024-09-09 | End: 2024-09-10

## 2024-09-09 RX ORDER — SPIRONOLACTONE 25 MG/1
25 TABLET ORAL DAILY
Status: DISCONTINUED | OUTPATIENT
Start: 2024-09-09 | End: 2024-09-09

## 2024-09-09 RX ORDER — LACTULOSE 10 G/15ML
20 SOLUTION ORAL; RECTAL 3 TIMES DAILY
Qty: 2838 ML | Refills: 0 | Status: SHIPPED | OUTPATIENT
Start: 2024-09-09 | End: 2024-10-10

## 2024-09-09 RX ORDER — LIDOCAINE HYDROCHLORIDE 10 MG/ML
INJECTION, SOLUTION EPIDURAL; INFILTRATION; INTRACAUDAL; PERINEURAL
Status: COMPLETED | OUTPATIENT
Start: 2024-09-09 | End: 2024-09-09

## 2024-09-09 RX ADMIN — ALBUMIN (HUMAN) 25 G: 0.25 INJECTION, SOLUTION INTRAVENOUS at 09:09

## 2024-09-09 RX ADMIN — SODIUM CHLORIDE, PRESERVATIVE FREE 10 ML: 5 INJECTION INTRAVENOUS at 11:09

## 2024-09-09 RX ADMIN — SODIUM CHLORIDE, PRESERVATIVE FREE 10 ML: 5 INJECTION INTRAVENOUS at 06:09

## 2024-09-09 RX ADMIN — CIPROFLOXACIN HYDROCHLORIDE 500 MG: 500 TABLET, FILM COATED ORAL at 09:09

## 2024-09-09 RX ADMIN — PREDNISONE 40 MG: 20 TABLET ORAL at 09:09

## 2024-09-09 RX ADMIN — SODIUM BICARBONATE 650 MG TABLET 650 MG: at 05:09

## 2024-09-09 RX ADMIN — SODIUM CHLORIDE, PRESERVATIVE FREE 10 ML: 5 INJECTION INTRAVENOUS at 05:09

## 2024-09-09 RX ADMIN — OXYCODONE HYDROCHLORIDE 5 MG: 5 TABLET ORAL at 06:09

## 2024-09-09 RX ADMIN — THIAMINE HCL TAB 100 MG 100 MG: 100 TAB at 09:09

## 2024-09-09 RX ADMIN — FOLIC ACID 1 MG: 1 TABLET ORAL at 09:09

## 2024-09-09 RX ADMIN — LEVETIRACETAM 500 MG: 500 TABLET, FILM COATED ORAL at 08:09

## 2024-09-09 RX ADMIN — SODIUM BICARBONATE 650 MG TABLET 650 MG: at 08:09

## 2024-09-09 RX ADMIN — LEVETIRACETAM 500 MG: 500 TABLET, FILM COATED ORAL at 09:09

## 2024-09-09 RX ADMIN — LIDOCAINE HYDROCHLORIDE 10 ML: 10 INJECTION, SOLUTION EPIDURAL; INFILTRATION; INTRACAUDAL; PERINEURAL at 02:09

## 2024-09-09 RX ADMIN — LACTULOSE 20 G: 20 SOLUTION ORAL at 09:09

## 2024-09-09 RX ADMIN — OXYCODONE HYDROCHLORIDE 5 MG: 5 TABLET ORAL at 08:09

## 2024-09-09 RX ADMIN — LACTULOSE 20 G: 20 SOLUTION ORAL at 08:09

## 2024-09-09 RX ADMIN — OCTREOTIDE ACETATE 50 MCG/HR: 500 INJECTION, SOLUTION INTRAVENOUS; SUBCUTANEOUS at 11:09

## 2024-09-09 RX ADMIN — SODIUM BICARBONATE 650 MG TABLET 650 MG: at 01:09

## 2024-09-09 RX ADMIN — SODIUM BICARBONATE 650 MG TABLET 650 MG: at 09:09

## 2024-09-09 NOTE — ASSESSMENT & PLAN NOTE
Decompensated with ascites, hepatorenal syndrome and hepatic encephalopathy and GI bleeding    MELD-Na score calculated; MELD 3.0: 36 at 9/9/2024  5:31 AM  MELD-Na: 34 at 9/9/2024  5:31 AM  Calculated from:  Serum Creatinine: 2.5 mg/dL at 9/9/2024  5:31 AM  Serum Sodium: 135 mmol/L at 9/9/2024  5:31 AM  Total Bilirubin: 23.8 mg/dL at 9/9/2024  5:31 AM  Serum Albumin: 3.7 g/dL (Using max of 3.5 g/dL) at 9/9/2024  5:31 AM  INR(ratio): 1.8 at 9/9/2024  5:31 AM  Age at listing (hypothetical): 44 years  Sex: Male at 9/9/2024  5:31 AM    Continue chronic meds. Etiology likely ETOH. Will avoid any hepatotoxic meds, and monitor CBC/CMP/INR for synthetic function.     Patient underwent abdominal paracentesis by Interventional Radiology with 5 L clear straw ascitic fluid on 9/3.  S/P 4 L of paracenthesis removed on 9/9. Spironolactone will be started on 9/10 to see if patient is able to tolerate it. Continue midodrine.  Cont Lactulose

## 2024-09-09 NOTE — PLAN OF CARE
Patient not clear for d/c-  order removed. Patient requiring Paracentesis today, med adjustments. When patient is ready for d/c to home, his mother providing transportation.       09/09/24 0907   Final Note   Assessment Type Final Discharge Note   Anticipated Discharge Disposition Home   Hospital Resources/Appts/Education Provided Appointments scheduled and added to AVS

## 2024-09-09 NOTE — PLAN OF CARE
Problem: Infection  Goal: Absence of Infection Signs and Symptoms  Outcome: Met     Problem: Acute Kidney Injury/Impairment  Goal: Effective Renal Function  Outcome: Met

## 2024-09-09 NOTE — SUBJECTIVE & OBJECTIVE
Interval History: Patient said he feels better    Review of Systems   All other systems reviewed and are negative.    Objective:     Vital Signs (Most Recent):  Temp: 98.1 °F (36.7 °C) (09/09/24 0701)  Pulse: 81 (09/09/24 1502)  Resp: 18 (09/09/24 1502)  BP: 127/87 (09/09/24 1502)  SpO2: 99 % (09/09/24 1502) Vital Signs (24h Range):  Temp:  [97.7 °F (36.5 °C)-98.3 °F (36.8 °C)] 98.1 °F (36.7 °C)  Pulse:  [67-92] 81  Resp:  [17-18] 18  SpO2:  [95 %-100 %] 99 %  BP: (121-157)/() 127/87     Weight: 81.4 kg (179 lb 7.3 oz)  Body mass index is 23.68 kg/m².    Intake/Output Summary (Last 24 hours) at 9/9/2024 1629  Last data filed at 9/9/2024 1511  Gross per 24 hour   Intake 1170 ml   Output 4000 ml   Net -2830 ml         Physical Exam  Cardiovascular:      Rate and Rhythm: Normal rate.      Pulses: Normal pulses.   Pulmonary:      Effort: Pulmonary effort is normal.   Abdominal:      General: There is distension.      Tenderness: There is no abdominal tenderness.   Neurological:      Mental Status: He is alert and oriented to person, place, and time.             Significant Labs: All pertinent labs within the past 24 hours have been reviewed.    Significant Imaging: I have reviewed all pertinent imaging results/findings within the past 24 hours.

## 2024-09-09 NOTE — PLAN OF CARE
[facility] is out of network for patients walker.  Sent to [facility] in [place].

## 2024-09-09 NOTE — ASSESSMENT & PLAN NOTE
S/p paracentesis 9/3 5 L removed   S/P 4 L of paracenthesis removed on 9/9.   Spironolactone will be started on 9/10 to see if patient is able to tolerate it. Continue midodrine   Cont prednisone for hepatitis

## 2024-09-09 NOTE — CONSULTS
"INPATIENT NEPHROLOGY Progress Note  [facility] NEPHROLOGY    Washington Glasgow  09/09/2024    Reason for consultation:  Acute kidney injury    Chief Complaint:   Chief Complaint   Patient presents with    Jaundice    Rectal Bleeding     History of Present Illness:    Per H and P  Patient is a 44-year-old male with history of alcoholic liver cirrhosis, last alcoholic drink a month ago, lower GI bleed, anemia, seizure disorder, chronic kidney disease presents to the emergency room with complaints of abdominal distention and rectal bleeding. Patient was recently at [facility] on 08/28 for GI bleed at that time, received blood transfusion. Underwent a colonoscopy, patient and significant other unsure if anything was found. They report that the cause of the rectal bleeding is still unknown, reports that "banding" was done. Patient left AMA. Significant other reports there were discussions about undergoing tips procedure, this is still pending. Patient follows with Gastroenterology outpatient. Patient reports ongoing on and off rectal bleeding, and worsening abdominal distention-primary reason he came here. Patient denies fever, chills, nausea, vomiting, chest pain, shortness of breath. Review of labs on 08/29 showed WBC 31, hemoglobin 6.9, creatinine 4.97, bilirubin 21. On this admission WBC 36.13, hemoglobin 8.1, creatinine 4, bilirubin 22.9. Patient reports also following with Nephrology outpatient. Patient transferred from [facility] for paracentesis     9/4  AFVSS, output not measured due to pt refusing to go in the urinal, no cp, sob, nausea.  More alert today.  Refused fernandez placement.  Colonoscopy limited due to stool.  1cm ulcer above dentate line seen.    9/5 refusing lactulose, with AMS, s/p 5L para neg for SBP, transfused 2u of blood, VSS, on RA, UOP 600cc +  voids- WBC rising, Hb better, renal function slightly better  9/6  VSS.  Labs not yet collected, pt is a difficult stick, no IV site " currently either.  Nurse planning to try to get blood for labs with IV attempt if possible.  No new complaints.  9/7  appreciate palliative care input.  VSS, UOP not recorded.  Renal function improving, liver enzymes trending down.  Updated pt/mother at bedside.  9/8  Scr trended down some.  UOP not recorded.  Getting IV albumin, octreotide gtt.  C/o being sleepy, no other complaints.  Mother at bedside, updated.  9/9 VSS, no new complains.    Plan of Care:     REMI  with sCr peaked at 4 on admission, now 2.5 and down trending  Underlying CKD, sCr 1.3 in 2022  Hyponatremia  Acidosis  Acute GIB  Anemia  ETOH cirrhosis with ascites and HE    - sCr is improving with treatment for HRS  - no nsaids or IV contrast- dose meds for CrCl < 30 - no acute RRT needs  - renal imaging unrevealing- PTH 91.3 might sugges underlying CKD-  UA trace protein, no blood- UPCR 0.4   - s/p para- 5L- may need another in a few days- no SBP- refusing lactulose- will inquire why on prednisone--alcoholic hepatitis mgt per primary notes  - 1.5L fluid restriction as tolerated  - added sod bicarb 650mg BID, improving  - trend H/H. GI eval done    Thank you for allowing us to participate in this patient's care. We will continue to follow.    Vital Signs:  Temp Readings from Last 3 Encounters:   09/09/24 98.1 °F (36.7 °C) (Oral)       Pulse Readings from Last 3 Encounters:   09/09/24 74   09/03/24 66       BP Readings from Last 3 Encounters:   09/09/24 134/87   09/03/24 109/75       Weight:  Wt Readings from Last 3 Encounters:   09/04/24 81.4 kg (179 lb 7.3 oz)   Medications:  No current facility-administered medications on file prior to encounter.     Current Outpatient Medications on File Prior to Encounter   Medication Sig Dispense Refill    folic acid (FOLVITE) 1 MG tablet Take 1,000 mcg by mouth once daily.      levETIRAcetam (KEPPRA) 500 MG Tab Take 500 mg by mouth 2 (two) times daily.      predniSONE (DELTASONE) 20 MG tablet Take 40 mg by mouth  "once daily.      PROTONIX 40 mg tablet Take 40 mg by mouth 2 (two) times daily.      sodium bicarbonate 650 MG tablet Take 650 mg by mouth 3 (three) times daily.      thiamine mononitrate, vit B1, (VITAMIN B-1, MONONITRATE,) 100 mg Tab Take 100 mg by mouth once daily.       Scheduled Meds:   albumin human 25%  25 g Intravenous BID    folic acid  1 mg Oral Daily    lactulose  20 g Oral TID    levETIRAcetam  500 mg Oral BID    midodrine  10 mg Oral Q8H    predniSONE  40 mg Oral Daily    sodium bicarbonate  650 mg Oral QID    sodium chloride 0.9%  10 mL Intravenous Q6H    spironolactone  25 mg Oral Daily    thiamine  100 mg Oral Daily     Continuous Infusions:   octreotide (SANDOSTATIN) 500 mcg in 0.9% NaCl 100 mL infusion  50 mcg/hr Intravenous Continuous 10 mL/hr at 09/08/24 2215 50 mcg/hr at 09/08/24 2215     PRN Meds:.  Current Facility-Administered Medications:     ALPRAZolam, 0.5 mg, Oral, TID PRN    magnesium oxide, 800 mg, Oral, PRN    magnesium oxide, 800 mg, Oral, PRN    ondansetron, 4 mg, Intravenous, Q6H PRN    oxyCODONE, 5 mg, Oral, Q4H PRN    potassium bicarbonate, 35 mEq, Oral, PRN    potassium bicarbonate, 50 mEq, Oral, PRN    potassium bicarbonate, 60 mEq, Oral, PRN    potassium, sodium phosphates, 2 packet, Oral, PRN    potassium, sodium phosphates, 2 packet, Oral, PRN    potassium, sodium phosphates, 2 packet, Oral, PRN    Flushing PICC/Midline Protocol, , , Until Discontinued **AND** sodium chloride 0.9%, 10 mL, Intravenous, Q6H **AND** sodium chloride 0.9%, 10 mL, Intravenous, PRN    Review of Systems:    Physical Exam:    /87   Pulse 74   Temp 98.1 °F (36.7 °C) (Oral)   Resp 18   Ht 6' 1" (1.854 m)   Wt 81.4 kg (179 lb 7.3 oz)   SpO2 98%   BMI 23.68 kg/m²     General Appearance:    Ill appearing   Head:    Normocephalic, without obvious abnormality, atraumatic   Eyes:    PER, conjunctiva/corneas clear, EOM's intact in both eyes        Throat:   Lips, mucosa, and tongue normal; teeth " "and gums normal   Back:     Symmetric, no curvature, ROM normal, no CVA tenderness   Lungs:     Clear to auscultation bilaterally, respirations unlabored   Chest wall:    No tenderness or deformity   Heart:    Regular rate and rhythm, S1 and S2 normal, no murmur, rub   or gallop   Abdomen:     Mild tenderness, very distended with ascites   Extremities:   Extremities normal, atraumatic, no cyanosis or edema   Pulses:   2+ and symmetric all extremities   MSK:   No joint or muscle swelling, tenderness or deformity   Skin:   Skin color, texture, turgor normal, no rashes or lesions   Neurologic:   No flap, poor insight     Results:  Recent Labs   Lab 09/07/24  0550 09/08/24  0727 09/09/24  0531    135* 135*   K 4.1 4.4 4.3    107 105   CO2 22* 21* 20*   BUN 40* 42* 44*   CREATININE 2.8* 2.7* 2.5*   * 147* 142*       Recent Labs   Lab 09/05/24  0841 09/07/24  0550 09/08/24 0727 09/09/24  0531   CALCIUM 8.8 8.6* 8.7 8.7   ALBUMIN 3.1* 3.2* 3.3* 3.7   PHOS 2.7  --   --   --        Recent Labs   Lab 09/05/24  0841   PTH, Intact 91.3 H       No results for input(s): "POCTGLUCOSE" in the last 168 hours.    Recent Labs   Lab 09/07/24  0550   Hemoglobin A1C 4.9       Recent Labs   Lab 09/07/24  0550 09/08/24  0728 09/09/24  0531   WBC 34.88* 34.48* 34.05*   HGB 8.5* 8.3* 8.6*   HCT 25.7* 27.0* 26.6*   PLT 90* 79* 79*   MCV 99* 100* 97   MCHC 33.1 30.7* 32.3   MONO 3.1*  1.1* 4.3  1.5* 3.7*  1.3*   EOSINOPHIL 0.1 0.1 0.0       Recent Labs   Lab 09/07/24  0550 09/08/24  0727 09/09/24  0531   BILITOT 21.0* 22.3* 23.8*   PROT 4.4* 4.5* 4.9*   ALBUMIN 3.2* 3.3* 3.7   ALKPHOS 126 120 133   ALT 39 35 36   AST 47* 46* 49*       Recent Labs   Lab 11/10/21  0047 01/17/22  1244 09/02/24  6167   Color, UA  --   --  Yellow   Appearance, UA  --   --  Clear   pH, UA  --   --  6.0   Specific Gravity, UA  --   --  1.015   Protein, UA  --   --  Trace A   Glucose, UA  --   --  Negative   Ketones, UA  --   --  Negative "   Urobilinogen, UA 2 A Normal Negative   Bilirubin (UA)  --   --  2+ A   Occult Blood UA  --   --  Negative   Nitrite, UA  --   --  Negative   RBC, UA 5-10 A 5-10 A  --    WBC, UA 0-2 A 5-10 A  --    Bacteria, UA Trace A 3+ A  --        Recent Labs   Lab 11/10/21  0047 01/17/22  1244   pH 7.5 6.0       Microbiology Results (last 7 days)       Procedure Component Value Units Date/Time    Blood culture [[id number]] Collected: 09/02/24 1656    Order Status: Completed Specimen: Blood from Peripheral, Antecubital, Right Updated: 09/08/24 1032     Blood Culture, Routine No growth after 5 days.    Blood culture [[id number]] Collected: 09/02/24 1615    Order Status: Completed Specimen: Blood from Peripheral, Antecubital, Right Updated: 09/07/24 2032     Blood Culture, Routine No growth after 5 days.    Aerobic culture [[id number]] Collected: 09/03/24 1017    Order Status: Completed Specimen: Peritoneal Fluid from Ascites Updated: 09/06/24 1007     Aerobic Bacterial Culture No growth    Culture, Anaerobic [[id number]] Collected: 09/03/24 1018    Order Status: Completed Specimen: Pleural Fluid from Ascites Updated: 09/05/24 1419     Anaerobic Culture No anaerobes isolated    Gram stain [[id number]] Collected: 09/03/24 1018    Order Status: Completed Specimen: Pleural Fluid from Ascites Updated: 09/04/24 1223     Gram Stain Result No WBC's      No organisms seen      from concentrated specimen          Patient care was time spent personally by me on the following activities: > min    Obtaining a history  Examination of patient.  Providing medical care at the patients bedside.  Developing a treatment plan with patient or surrogate and bedside caregivers  Ordering and reviewing laboratory studies, radiographic studies, pulse oximetry.  Ordering and performing treatments and interventions.  Evaluation of patient's response to treatment.  Discussions with consultants while on the unit and immediately available to the  patient.  Re-evaluation of the patient's condition.  Documentation in the medical record.     Bernabe Coombs MD    [facility]  [phone]

## 2024-09-09 NOTE — PLAN OF CARE
Paracentesis completed.  Pt dorothy well.  4 liters of clear yellow fluid drained.   Gauze and paper tape dressing to right mid abd.  Pt assisted to w/c and transported to room 1214.  Report given to pt's nurseBrittany.

## 2024-09-09 NOTE — CARE UPDATE
The mobility limitation cannot be sufficiently resolved by the use of a cane. Patient's functional mobility deficit can be sufficiently resolved with the use of a (Rolling Walker or Walker). Patient's mobility limitation significantly impairs their ability to participate in one of more activities of daily living. The use of a (RW or Walker) will significantly improve the patient's ability to participate in MRADLS and the patient will use it on regular basis in the home.

## 2024-09-09 NOTE — PROGRESS NOTES
"[facility]  Progress Note    Patient Name: Washington Glasgow  MRN: [id number]  Patient Class: IP- Inpatient   Admission Date: 9/2/2024  Length of Stay: 7 days  Attending Physician: Wilbert Lopez DO  Primary Care Provider: Sahil Gallegos NP        Subjective:     Principal Problem:Alcoholic cirrhosis of liver with ascites        HPI:  Patient is a 44-year-old male with history of alcoholic liver cirrhosis, last alcoholic drink a month ago, lower GI bleed, anemia, seizure disorder, chronic kidney disease presents to the emergency room with complaints of abdominal distention and rectal bleeding.  Patient was recently at [facility] on 08/28 for GI bleed at that time, received blood transfusion.  Underwent a colonoscopy, patient and significant other unsure if anything was found.  They report that the cause of the rectal bleeding is still unknown, reports that "banding" was done.  Patient left AMA.  Significant other reports there were discussions about undergoing tips procedure, this is still pending.  Patient follows with Gastroenterology outpatient.  Patient reports ongoing on and off rectal bleeding, and worsening abdominal distention-primary reason he came here.  Patient denies fever, chills, nausea, vomiting, chest pain, shortness of breath.  Review of labs on 08/29 showed WBC 31, hemoglobin 6.9, creatinine 4.97, bilirubin 21.  On this admission WBC 36.13, hemoglobin 8.1, creatinine 4, bilirubin 22.9.  Patient reports also following with Nephrology outpatient.  Patient transferred from [facility] for paracentesis.  Gastroenterology and Nephrology consulted.       Overview/Hospital Course:  Patient admitted to intensive care unit.  Hemoglobin and hematocrit closely monitored.  Patient received 2 units of packed red blood cells.  Gastroenterology and nephrology team consulted.  Patient maintained on midodrine, octreotide and albumin therapy.  Patient underwent abdominal " paracentesis with 5 L ascitic straw-colored fluid removal.  Patient maintained on intravenous ceftriaxone prophylaxis.  Microbiology closely followed.  Patient maintained on lactulose.  Serum ammonia trended.  Patient maintained on prednisone therapy for alcoholic hepatitis management.  Patient was evaluated by GI specialist who performed flexible sigmoidoscopy revealing a linear ulceration at dentate line.  Patient has been noncompliant with use of lactulose and has refused Macias catheter placement during hospital stay.  Multiple conversations regarding compliance with lactulose were conducted with patient and his mother. Palliative care was also consulted.  GI recommended octreotide until 09/09/2024. S/P 4 L of paracenthesis removed on 9/9. Spironolactone will be started on 9/10 to see if patient is able to tolerate it. Continue midodrine. Nephrology follow patient for REMI on CKD during hospital stay. Patient also has acute on chronic leukocytosis and could be multifactorial including being on prednisone.  Recommended outpatient follow-up with Hematology Oncology.       Interval History: Patient said he feels better    Review of Systems   All other systems reviewed and are negative.    Objective:     Vital Signs (Most Recent):  Temp: 98.1 °F (36.7 °C) (09/09/24 0701)  Pulse: 81 (09/09/24 1502)  Resp: 18 (09/09/24 1502)  BP: 127/87 (09/09/24 1502)  SpO2: 99 % (09/09/24 1502) Vital Signs (24h Range):  Temp:  [97.7 °F (36.5 °C)-98.3 °F (36.8 °C)] 98.1 °F (36.7 °C)  Pulse:  [67-92] 81  Resp:  [17-18] 18  SpO2:  [95 %-100 %] 99 %  BP: (121-157)/() 127/87     Weight: 81.4 kg (179 lb 7.3 oz)  Body mass index is 23.68 kg/m².    Intake/Output Summary (Last 24 hours) at 9/9/2024 1629  Last data filed at 9/9/2024 1511  Gross per 24 hour   Intake 1170 ml   Output 4000 ml   Net -2830 ml         Physical Exam  Cardiovascular:      Rate and Rhythm: Normal rate.      Pulses: Normal pulses.   Pulmonary:      Effort:  Pulmonary effort is normal.   Abdominal:      General: There is distension.      Tenderness: There is no abdominal tenderness.   Neurological:      Mental Status: He is alert and oriented to person, place, and time.             Significant Labs: All pertinent labs within the past 24 hours have been reviewed.    Significant Imaging: I have reviewed all pertinent imaging results/findings within the past 24 hours.    Assessment/Plan:      * Alcoholic cirrhosis of liver with ascites  Decompensated with ascites, hepatorenal syndrome and hepatic encephalopathy and GI bleeding    MELD-Na score calculated; MELD 3.0: 36 at 9/9/2024  5:31 AM  MELD-Na: 34 at 9/9/2024  5:31 AM  Calculated from:  Serum Creatinine: 2.5 mg/dL at 9/9/2024  5:31 AM  Serum Sodium: 135 mmol/L at 9/9/2024  5:31 AM  Total Bilirubin: 23.8 mg/dL at 9/9/2024  5:31 AM  Serum Albumin: 3.7 g/dL (Using max of 3.5 g/dL) at 9/9/2024  5:31 AM  INR(ratio): 1.8 at 9/9/2024  5:31 AM  Age at listing (hypothetical): 44 years  Sex: Male at 9/9/2024  5:31 AM    Continue chronic meds. Etiology likely ETOH. Will avoid any hepatotoxic meds, and monitor CBC/CMP/INR for synthetic function.     Patient underwent abdominal paracentesis by Interventional Radiology with 5 L clear straw ascitic fluid on 9/3.  S/P 4 L of paracenthesis removed on 9/9. Spironolactone will be started on 9/10 to see if patient is able to tolerate it. Continue midodrine.  Cont Lactulose         Alcoholic hepatitis with ascites  S/p paracentesis 9/3 5 L removed   Diuresis per Nephrology  Likely will need another paracentesis   Cont prednisone for hepatitis       REMI (acute kidney injury)  REMI is likely due to  hepatorenal syndrome . Baseline creatinine is unknown. Most recent creatinine and eGFR are listed below.  Recent Labs     09/04/24  0238 09/05/24  0841   CREATININE 3.5* 3.1*   EGFRNORACEVR 21.2* 24.5*       - Nephrology following, specific treatment for HRS, including Sandostatin and albumin  -  Avoid nephrotoxins and renally dose meds for GFR listed above  - Monitor urine output, serial BMP, and adjust therapy as needed    Hepatic encephalopathy  Maintain patient on lactulose to ensure 3-4 bowel movements daily.  Monitor cognitive functions and level of alertness.    Rectal bleeding  GI recommended octreotide till 09/09/2024    Seizure disorder  Continue Keppra      ABLA (acute blood loss anemia)  Secondary to acute on chronic GI blood loss.  Anemia is likely due to acute blood loss.   Flex sigmoidoscopy 9/3:1cm ulceration above dentate line w/ erythema and slight ooze with manipulation. No interventions performed.      Most recent hemoglobin and hematocrit are listed below.  Recent Labs     09/04/24  0238 09/04/24  2241 09/05/24  0841   HGB 8.4* 9.2* 10.1*   HCT 24.5* 27.5* 30.1*     Stable   Patient was transfused 2 units 9/3  Patient is on octreotide infusion ? 72 hrs       VTE Risk Mitigation (From admission, onward)           Ordered     Place sequential compression device  Until discontinued         09/02/24 2045     IP VTE LOW RISK PATIENT  Once         09/02/24 2045                    Discharge Planning   ED: 9/11/2024     Code Status: Full Code   Is the patient medically ready for discharge?:     Reason for patient still in hospital (select all that apply): Patient trending condition  Discharge Plan A: Home with family                  Wilbert Lopez DO  Department of Hospital Medicine   [facility]

## 2024-09-10 VITALS
RESPIRATION RATE: 18 BRPM | BODY MASS INDEX: 23.78 KG/M2 | DIASTOLIC BLOOD PRESSURE: 74 MMHG | SYSTOLIC BLOOD PRESSURE: 125 MMHG | WEIGHT: 179.44 LBS | OXYGEN SATURATION: 100 % | HEART RATE: 93 BPM | HEIGHT: 73 IN | TEMPERATURE: 98 F

## 2024-09-10 LAB
ALBUMIN SERPL BCP-MCNC: 3.6 G/DL (ref 3.5–5.2)
ALP SERPL-CCNC: 130 U/L (ref 55–135)
ALT SERPL W/O P-5'-P-CCNC: 34 U/L (ref 10–44)
ANION GAP SERPL CALC-SCNC: 13 MMOL/L (ref 8–16)
ANISOCYTOSIS BLD QL SMEAR: ABNORMAL
AST SERPL-CCNC: 45 U/L (ref 10–40)
BASOPHILS # BLD AUTO: 0.03 K/UL (ref 0–0.2)
BASOPHILS NFR BLD: 0.1 % (ref 0–1.9)
BILIRUB SERPL-MCNC: 22.1 MG/DL (ref 0.1–1)
BUN SERPL-MCNC: 41 MG/DL (ref 6–20)
BURR CELLS BLD QL SMEAR: ABNORMAL
CALCIUM SERPL-MCNC: 8.5 MG/DL (ref 8.7–10.5)
CHLORIDE SERPL-SCNC: 105 MMOL/L (ref 95–110)
CO2 SERPL-SCNC: 19 MMOL/L (ref 23–29)
CREAT SERPL-MCNC: 2.3 MG/DL (ref 0.5–1.4)
DIFFERENTIAL METHOD BLD: ABNORMAL
EOSINOPHIL # BLD AUTO: 0 K/UL (ref 0–0.5)
EOSINOPHIL NFR BLD: 0 % (ref 0–8)
ERYTHROCYTE [DISTWIDTH] IN BLOOD BY AUTOMATED COUNT: 20.1 % (ref 11.5–14.5)
EST. GFR  (NO RACE VARIABLE): 35 ML/MIN/1.73 M^2
GLUCOSE SERPL-MCNC: 176 MG/DL (ref 70–110)
HCT VFR BLD AUTO: 23.9 % (ref 40–54)
HGB BLD-MCNC: 7.8 G/DL (ref 14–18)
IMM GRANULOCYTES # BLD AUTO: 0.24 K/UL (ref 0–0.04)
IMM GRANULOCYTES NFR BLD AUTO: 0.8 % (ref 0–0.5)
INR PPP: 1.8 (ref 0.8–1.2)
LYMPHOCYTES # BLD AUTO: 0.7 K/UL (ref 1–4.8)
LYMPHOCYTES NFR BLD: 2.4 % (ref 18–48)
MCH RBC QN AUTO: 31.5 PG (ref 27–31)
MCHC RBC AUTO-ENTMCNC: 32.6 G/DL (ref 32–36)
MCV RBC AUTO: 96 FL (ref 82–98)
MONOCYTES # BLD AUTO: 1.4 K/UL (ref 0.3–1)
MONOCYTES NFR BLD: 4.7 % (ref 4–15)
NEUTROPHILS # BLD AUTO: 27.9 K/UL (ref 1.8–7.7)
NEUTROPHILS NFR BLD: 92 % (ref 38–73)
NRBC BLD-RTO: 0 /100 WBC
OVALOCYTES BLD QL SMEAR: ABNORMAL
PLATELET # BLD AUTO: 74 K/UL (ref 150–450)
PLATELET BLD QL SMEAR: ABNORMAL
PMV BLD AUTO: 13 FL (ref 9.2–12.9)
POTASSIUM SERPL-SCNC: 4 MMOL/L (ref 3.5–5.1)
PROT SERPL-MCNC: 4.8 G/DL (ref 6–8.4)
PROTHROMBIN TIME: 19.2 SEC (ref 9–12.5)
RBC # BLD AUTO: 2.48 M/UL (ref 4.6–6.2)
SODIUM SERPL-SCNC: 137 MMOL/L (ref 136–145)
TARGETS BLD QL SMEAR: ABNORMAL
WBC # BLD AUTO: 30.35 K/UL (ref 3.9–12.7)

## 2024-09-10 PROCEDURE — 36415 COLL VENOUS BLD VENIPUNCTURE: CPT | Performed by: STUDENT IN AN ORGANIZED HEALTH CARE EDUCATION/TRAINING PROGRAM

## 2024-09-10 PROCEDURE — 63600175 PHARM REV CODE 636 W HCPCS: Mod: JA | Performed by: INTERNAL MEDICINE

## 2024-09-10 PROCEDURE — 25000003 PHARM REV CODE 250: Performed by: FAMILY MEDICINE

## 2024-09-10 PROCEDURE — 80053 COMPREHEN METABOLIC PANEL: CPT | Performed by: STUDENT IN AN ORGANIZED HEALTH CARE EDUCATION/TRAINING PROGRAM

## 2024-09-10 PROCEDURE — 63600175 PHARM REV CODE 636 W HCPCS: Performed by: STUDENT IN AN ORGANIZED HEALTH CARE EDUCATION/TRAINING PROGRAM

## 2024-09-10 PROCEDURE — A4216 STERILE WATER/SALINE, 10 ML: HCPCS | Performed by: INTERNAL MEDICINE

## 2024-09-10 PROCEDURE — 25000003 PHARM REV CODE 250: Performed by: HOSPITALIST

## 2024-09-10 PROCEDURE — 85610 PROTHROMBIN TIME: CPT | Performed by: STUDENT IN AN ORGANIZED HEALTH CARE EDUCATION/TRAINING PROGRAM

## 2024-09-10 PROCEDURE — 25000003 PHARM REV CODE 250: Performed by: INTERNAL MEDICINE

## 2024-09-10 PROCEDURE — P9047 ALBUMIN (HUMAN), 25%, 50ML: HCPCS | Mod: JZ,JG | Performed by: STUDENT IN AN ORGANIZED HEALTH CARE EDUCATION/TRAINING PROGRAM

## 2024-09-10 PROCEDURE — 25000003 PHARM REV CODE 250: Performed by: STUDENT IN AN ORGANIZED HEALTH CARE EDUCATION/TRAINING PROGRAM

## 2024-09-10 PROCEDURE — 85025 COMPLETE CBC W/AUTO DIFF WBC: CPT | Performed by: STUDENT IN AN ORGANIZED HEALTH CARE EDUCATION/TRAINING PROGRAM

## 2024-09-10 RX ORDER — SPIRONOLACTONE 25 MG/1
25 TABLET ORAL DAILY
Qty: 30 TABLET | Refills: 0 | Status: SHIPPED | OUTPATIENT
Start: 2024-09-11 | End: 2024-10-11

## 2024-09-10 RX ORDER — SPIRONOLACTONE 25 MG/1
25 TABLET ORAL DAILY
Status: DISCONTINUED | OUTPATIENT
Start: 2024-09-10 | End: 2024-09-10 | Stop reason: HOSPADM

## 2024-09-10 RX ADMIN — THIAMINE HCL TAB 100 MG 100 MG: 100 TAB at 09:09

## 2024-09-10 RX ADMIN — LACTULOSE 20 G: 20 SOLUTION ORAL at 09:09

## 2024-09-10 RX ADMIN — SODIUM CHLORIDE, PRESERVATIVE FREE 10 ML: 5 INJECTION INTRAVENOUS at 05:09

## 2024-09-10 RX ADMIN — LEVETIRACETAM 500 MG: 500 TABLET, FILM COATED ORAL at 09:09

## 2024-09-10 RX ADMIN — SODIUM BICARBONATE 650 MG TABLET 650 MG: at 09:09

## 2024-09-10 RX ADMIN — SODIUM CHLORIDE, PRESERVATIVE FREE 10 ML: 5 INJECTION INTRAVENOUS at 01:09

## 2024-09-10 RX ADMIN — OCTREOTIDE ACETATE 50 MCG/HR: 500 INJECTION, SOLUTION INTRAVENOUS; SUBCUTANEOUS at 09:09

## 2024-09-10 RX ADMIN — FOLIC ACID 1 MG: 1 TABLET ORAL at 09:09

## 2024-09-10 RX ADMIN — ALBUMIN (HUMAN) 25 G: 0.25 INJECTION, SOLUTION INTRAVENOUS at 11:09

## 2024-09-10 RX ADMIN — OXYCODONE HYDROCHLORIDE 5 MG: 5 TABLET ORAL at 09:09

## 2024-09-10 RX ADMIN — SPIRONOLACTONE 25 MG: 25 TABLET ORAL at 09:09

## 2024-09-10 RX ADMIN — PREDNISONE 40 MG: 20 TABLET ORAL at 09:09

## 2024-09-10 NOTE — ACP (ADVANCE CARE PLANNING)
[facility]  Palliative Care   Social Work Visit      Patient Name: Washington Glasgow  MRN: [id number]  Palliative Care Provider:   Primary Care Physician: Sahil Gallegos NP  Principal Problem:Alcoholic cirrhosis of liver with ascites    PC SW met with pt and mother Stephenie Glasgow at bedside to provide supportive care. PT stated that he feels ok and is ready for discharge home. Pts mother stated that she is ready to discharge home before the weather gets bad. PT stated that he has three children, ages 19, 6 and 10. He would like his mom, Stephenie Glasgow [phone] sitting at bedside to be his MPOA and decision maker and he is willing to complete paperwork today. PT signed and completed MPOA, witnessed by myself . Paperwork to be scanned and uploaded into epic. PC SW following for any additional needs.     JR MosherW-BACS

## 2024-09-10 NOTE — PLAN OF CARE
Pt clear for DC from case management standpoint. Discharging to home with family at bedside to transport.        09/10/24 1139   Final Note   Assessment Type Final Discharge Note   Anticipated Discharge Disposition Home

## 2024-09-10 NOTE — ACP (ADVANCE CARE PLANNING)
Advance Care Planning     Date: 09/10/2024    Power of   I initiated the process of voluntary advance care planning today and explained the importance of this process to the patient.  I introduced the concept of advance directives to the patient, as well. Then the patient received detailed information about the importance of designating a Health Care Power of  (HCPOA). He was also instructed to communicate with this person about their wishes for future healthcare, should he become sick and lose decision-making capacity. The patient has not previously appointed a HCPOA. After our discussion, the patient has decided to complete a HCPOA and has appointed his mother, health care agent:  Stephenie Glasgow  & health care agent number:   . I encouraged him to communicate with this person about their wishes for future healthcare, should he become sick and lose decision-making capacity.      A total of 15 min was spent on advance care planning, goals of care discussion, emotional support, formulating and communicating prognosis and exploring burden/benefit of various approaches of treatment. This discussion occurred on a fully voluntary basis with the verbal consent of the patient and/or family.

## 2024-09-10 NOTE — DISCHARGE SUMMARY
"[facility] Medicine  Discharge Summary      Patient Name: Washington Glasgow  MRN: [id number]  AYLIN: [id number]  Patient Class: IP- Inpatient  Admission Date: 9/2/2024  Hospital Length of Stay: 8 days  Discharge Date and Time:  09/10/2024 5:54 PM  Attending Physician: No att. providers found   Discharging Provider: Wilbert Lopez DO  Primary Care Provider: Sahil Galelgos NP    Primary Care Team: Networked reference to record PCT     HPI:   Patient is a 44-year-old male with history of alcoholic liver cirrhosis, last alcoholic drink a month ago, lower GI bleed, anemia, seizure disorder, chronic kidney disease presents to the emergency room with complaints of abdominal distention and rectal bleeding.  Patient was recently at [facility] on 08/28 for GI bleed at that time, received blood transfusion.  Underwent a colonoscopy, patient and significant other unsure if anything was found.  They report that the cause of the rectal bleeding is still unknown, reports that "banding" was done.  Patient left AMA.  Significant other reports there were discussions about undergoing tips procedure, this is still pending.  Patient follows with Gastroenterology outpatient.  Patient reports ongoing on and off rectal bleeding, and worsening abdominal distention-primary reason he came here.  Patient denies fever, chills, nausea, vomiting, chest pain, shortness of breath.  Review of labs on 08/29 showed WBC 31, hemoglobin 6.9, creatinine 4.97, bilirubin 21.  On this admission WBC 36.13, hemoglobin 8.1, creatinine 4, bilirubin 22.9.  Patient reports also following with Nephrology outpatient.  Patient transferred from [facility] for paracentesis.  Gastroenterology and Nephrology consulted.       Procedure(s) (LRB):  SIGMOIDOSCOPY, FLEXIBLE (N/A)      Hospital Course:   Patient admitted to intensive care unit.  Hemoglobin and hematocrit closely monitored.  Patient received 2 units of packed red blood cells. "  Gastroenterology and nephrology team consulted.  Patient maintained on midodrine, octreotide and albumin therapy.  Patient underwent abdominal paracentesis with 5 L ascitic straw-colored fluid removal.  Patient maintained on intravenous ceftriaxone prophylaxis.  Microbiology closely followed.  Patient maintained on lactulose.  Serum ammonia trended.  Patient maintained on prednisone therapy for alcoholic hepatitis management.  Patient was evaluated by GI specialist who performed flexible sigmoidoscopy revealing a linear ulceration at dentate line.  Patient has been noncompliant with use of lactulose and has refused Macias catheter placement during hospital stay.  Multiple conversations regarding compliance with lactulose were conducted with patient and his mother. Palliative care was also consulted.  GI recommended octreotide until 09/09/2024. S/P 4 L of paracenthesis removed on 9/9. Spironolactone will be started on 9/10 to see if patient is able to tolerate it. Continue midodrine. Nephrology follow patient for REMI on CKD during hospital stay. Patient also has acute on chronic leukocytosis and could be multifactorial including being on prednisone.  Recommended outpatient follow-up with Hematology Oncology.        Goals of Care Treatment Preferences:  Code Status: Full Code    Health care agent: Stephenie Glasgow  [facility] care agent number:                    [facility] Screening:  The patient was screened for utility difficulties, food insecurity, transport difficulties, housing insecurity, and interpersonal safety and there were no concerns identified this admission.     Consults:   Consults (From admission, onward)          Status Ordering Provider     Inpatient consult to Palliative Care  Once        Provider:  Richie Silva MD    Completed JAYSON TORO     Inpatient consult to Palliative Care  Once        Provider:  Richie Silva MD    Completed FAREED BOGGS     Inpatient consult to  "Nephrology  Once        Provider:  Pablito Holloway MD    Completed LANA SAUCEDO     Inpatient consult to Gastroenterology  Once        Provider:  Jamie Reich III, MD    Completed LANA SAUCEDO            No new Assessment & Plan notes have been filed under this hospital service since the last note was generated.  Service: Hospital Medicine    Final Active Diagnoses:    Diagnosis Date Noted POA    PRINCIPAL PROBLEM:  Alcoholic cirrhosis of liver with ascites [K70.31] 09/02/2024 Yes    Alcoholic hepatitis with ascites [K70.11] 09/06/2024 Yes    Goals of care, counseling/discussion [Z71.89] 09/06/2024 Not Applicable    REMI (acute kidney injury) [N17.9] 09/05/2024 Yes    Hepatic encephalopathy [K76.82] 09/03/2024 Yes    ABLA (acute blood loss anemia) [D62] 09/02/2024 Yes    Seizure disorder [G40.909] 09/02/2024 Yes    Rectal bleeding [K62.5] 09/02/2024 Yes      Problems Resolved During this Admission:       Discharged Condition: fair    Disposition: Home or Self Care    Follow Up:   Follow-up Information       Sahil Gallegos NP Follow up on 9/13/2024.    Specialty: Family Medicine  Why: Hospital follow up 9/13/24 0920  Contact information:  [address] [facility] [address]  [phone]               Patito Perkins MD. Call in 1 week(s).    Specialty: Nephrology  Why: call and make your follow up appointment.  Contact information:  [address]  [facility]  [place] [address]  [phone]               Deonte Alicia MD Follow up.    Specialties: Hematology and Oncology, Hematology, Oncology  Why: call to schedule follow up appointment  Contact information:  [address]  [address]  [place] [address]  [phone]                           Patient Instructions:      WALKER FOR HOME USE     Order Specific Question Answer Comments   Type of Walker: Adult (5'4"-6'6")    With wheels? Yes    Height: 6' 1" (1.854 m)    Weight: 81.4 kg (179 lb 7.3 oz)    Length of need " (1-99 months): 99    Does patient have medical equipment at home? cane, straight    Please check all that apply: Patient's condition impairs ambulation.    Please check all that apply: Patient is unable to safely ambulate without equipment.    Please check all that apply: Patient needs help to get in and out of chair.    Please check all that apply: Altered sensory perception.      Comprehensive metabolic panel   Standing Status: Future Standing Exp. Date: 12/08/25     Order Specific Question Answer Comments   Send normal result to authorizing provider's In Basket if patient is active on MyChart: Yes      CBC auto differential   Standing Status: Future Standing Exp. Date: 09/11/24     Order Specific Question Answer Comments   Send normal result to authorizing provider's In Basket if patient is active on MyChart: Yes      Ambulatory referral/consult to Hepatology   Standing Status: Future   Referral Priority: Routine Referral Type: Consultation   Referral Reason: Specialty Services Required   Requested Specialty: Hepatology   Number of Visits Requested: 1     Ambulatory referral/consult to Hematology / Oncology   Standing Status: Future   Referral Priority: Routine Referral Type: Consultation   Referral Reason: Specialty Services Required   Requested Specialty: Hematology and Oncology   Number of Visits Requested: 1       Significant Diagnostic Studies: N/A    Pending Diagnostic Studies:       None           Medications:  None    Indwelling Lines/Drains at time of discharge:   Lines/Drains/Airways       None                   Time spent on the discharge of patient: 32 minutes         Wilbert Lopez DO  Department of Hospital Medicine  [facility]

## 2024-09-10 NOTE — PROGRESS NOTES
"INPATIENT NEPHROLOGY Progress Note  [facility] NEPHROLOGY    Washington Glasgow  09/10/2024    Reason for consultation:  Acute kidney injury    Chief Complaint:   Chief Complaint   Patient presents with    Jaundice    Rectal Bleeding     History of Present Illness:    Per H and P  Patient is a 44-year-old male with history of alcoholic liver cirrhosis, last alcoholic drink a month ago, lower GI bleed, anemia, seizure disorder, chronic kidney disease presents to the emergency room with complaints of abdominal distention and rectal bleeding. Patient was recently at [facility] on 08/28 for GI bleed at that time, received blood transfusion. Underwent a colonoscopy, patient and significant other unsure if anything was found. They report that the cause of the rectal bleeding is still unknown, reports that "banding" was done. Patient left AMA. Significant other reports there were discussions about undergoing tips procedure, this is still pending. Patient follows with Gastroenterology outpatient. Patient reports ongoing on and off rectal bleeding, and worsening abdominal distention-primary reason he came here. Patient denies fever, chills, nausea, vomiting, chest pain, shortness of breath. Review of labs on 08/29 showed WBC 31, hemoglobin 6.9, creatinine 4.97, bilirubin 21. On this admission WBC 36.13, hemoglobin 8.1, creatinine 4, bilirubin 22.9. Patient reports also following with Nephrology outpatient. Patient transferred from [facility] for paracentesis     9/4  AFVSS, output not measured due to pt refusing to go in the urinal, no cp, sob, nausea.  More alert today.  Refused fernandez placement.  Colonoscopy limited due to stool.  1cm ulcer above dentate line seen.    9/5 refusing lactulose, with AMS, s/p 5L para neg for SBP, transfused 2u of blood, VSS, on RA, UOP 600cc +  voids- WBC rising, Hb better, renal function slightly better  9/6  VSS.  Labs not yet collected, pt is a difficult stick, no IV site " currently either.  Nurse planning to try to get blood for labs with IV attempt if possible.  No new complaints.  9/7  appreciate palliative care input.  VSS, UOP not recorded.  Renal function improving, liver enzymes trending down.  Updated pt/mother at bedside.  9/8  Scr trended down some.  UOP not recorded.  Getting IV albumin, octreotide gtt.  C/o being sleepy, no other complaints.  Mother at bedside, updated.  9/9 VSS, no new complains.  9/10  VSS, Scr trended down.  OK to d/c pt to home from renal standpoint.  He will need f/u labs next week and will need f/u in our office as well--suspect pt has underlying CKD.  Updated pt/mother.    Plan of Care:     REMI  with sCr peaked at 4 on admission, now 2.5 and down trending  Underlying CKD, sCr 1.3 in 2022  Hyponatremia  Acidosis  Acute GIB  Anemia  ETOH cirrhosis with ascites and HE    - sCr is improving with treatment for HRS  - no nsaids or IV contrast- dose meds for CrCl < 30 - no acute RRT needs  - renal imaging unrevealing- PTH 91.3 might sugges underlying CKD-  UA trace protein, no blood- UPCR 0.4   - s/p para- 5L- may need another in a few days- no SBP- refusing lactulose- will inquire why on prednisone--alcoholic hepatitis mgt per primary notes  - 1.5L fluid restriction as tolerated  - added sod bicarb 650mg BID, improving  - trend H/H. GI benjie done    Thank you for allowing us to participate in this patient's care. We will continue to follow.    Vital Signs:  Temp Readings from Last 3 Encounters:   09/10/24 98.6 °F (37 °C) (Oral)       Pulse Readings from Last 3 Encounters:   09/10/24 87   09/03/24 66       BP Readings from Last 3 Encounters:   09/10/24 136/83   09/03/24 109/75       Weight:  Wt Readings from Last 3 Encounters:   09/04/24 81.4 kg (179 lb 7.3 oz)   Medications:  No current facility-administered medications on file prior to encounter.     Current Outpatient Medications on File Prior to Encounter   Medication Sig Dispense Refill    folic acid  "(FOLVITE) 1 MG tablet Take 1,000 mcg by mouth once daily.      levETIRAcetam (KEPPRA) 500 MG Tab Take 500 mg by mouth 2 (two) times daily.      predniSONE (DELTASONE) 20 MG tablet Take 40 mg by mouth once daily.      PROTONIX 40 mg tablet Take 40 mg by mouth 2 (two) times daily.      sodium bicarbonate 650 MG tablet Take 650 mg by mouth 3 (three) times daily.      thiamine mononitrate, vit B1, (VITAMIN B-1, MONONITRATE,) 100 mg Tab Take 100 mg by mouth once daily.       Scheduled Meds:   albumin human 25%  25 g Intravenous BID    folic acid  1 mg Oral Daily    lactulose  20 g Oral TID    levETIRAcetam  500 mg Oral BID    midodrine  10 mg Oral Q8H    predniSONE  40 mg Oral Daily    sodium bicarbonate  650 mg Oral QID    sodium chloride 0.9%  10 mL Intravenous Q6H    spironolactone  25 mg Oral Daily    thiamine  100 mg Oral Daily     Continuous Infusions:   octreotide (SANDOSTATIN) 500 mcg in 0.9% NaCl 100 mL infusion  50 mcg/hr Intravenous Continuous 10 mL/hr at 09/09/24 2313 50 mcg/hr at 09/09/24 2313     PRN Meds:.  Current Facility-Administered Medications:     ALPRAZolam, 0.5 mg, Oral, TID PRN    magnesium oxide, 800 mg, Oral, PRN    magnesium oxide, 800 mg, Oral, PRN    ondansetron, 4 mg, Intravenous, Q6H PRN    oxyCODONE, 5 mg, Oral, Q4H PRN    potassium bicarbonate, 35 mEq, Oral, PRN    potassium bicarbonate, 50 mEq, Oral, PRN    potassium bicarbonate, 60 mEq, Oral, PRN    potassium, sodium phosphates, 2 packet, Oral, PRN    potassium, sodium phosphates, 2 packet, Oral, PRN    potassium, sodium phosphates, 2 packet, Oral, PRN    Flushing PICC/Midline Protocol, , , Until Discontinued **AND** sodium chloride 0.9%, 10 mL, Intravenous, Q6H **AND** sodium chloride 0.9%, 10 mL, Intravenous, PRN    Review of Systems:    Physical Exam:    /83   Pulse 87   Temp 98.6 °F (37 °C) (Oral)   Resp 18   Ht 6' 1" (1.854 m)   Wt 81.4 kg (179 lb 7.3 oz)   SpO2 98%   BMI 23.68 kg/m²     General Appearance:    Ill " "appearing   Head:    Normocephalic, without obvious abnormality, atraumatic   Eyes:    PER, conjunctiva/corneas clear, EOM's intact in both eyes        Throat:   Lips, mucosa, and tongue normal; teeth and gums normal   Back:     Symmetric, no curvature, ROM normal, no CVA tenderness   Lungs:     Clear to auscultation bilaterally, respirations unlabored   Chest wall:    No tenderness or deformity   Heart:    Regular rate and rhythm, S1 and S2 normal, no murmur, rub   or gallop   Abdomen:     Mild tenderness, very distended with ascites   Extremities:   Extremities normal, atraumatic, no cyanosis or edema   Pulses:   2+ and symmetric all extremities   MSK:   No joint or muscle swelling, tenderness or deformity   Skin:   Skin color, texture, turgor normal, no rashes or lesions   Neurologic:   No flap, poor insight     Results:  Recent Labs   Lab 09/08/24  0727 09/09/24  0531 09/10/24  0516   * 135* 137   K 4.4 4.3 4.0    105 105   CO2 21* 20* 19*   BUN 42* 44* 41*   CREATININE 2.7* 2.5* 2.3*   * 142* 176*       Recent Labs   Lab 09/05/24  0841 09/07/24  0550 09/08/24  0727 09/09/24  0531 09/10/24  0516   CALCIUM 8.8   < > 8.7 8.7 8.5*   ALBUMIN 3.1*   < > 3.3* 3.7 3.6   PHOS 2.7  --   --   --   --     < > = values in this interval not displayed.       Recent Labs   Lab 09/05/24  0841   PTH, Intact 91.3 H       No results for input(s): "POCTGLUCOSE" in the last 168 hours.    Recent Labs   Lab 09/07/24  0550   Hemoglobin A1C 4.9       Recent Labs   Lab 09/08/24  0728 09/09/24  0531 09/10/24  0516   WBC 34.48* 34.05* 30.35*   HGB 8.3* 8.6* 7.8*   HCT 27.0* 26.6* 23.9*   PLT 79* 79* 74*   * 97 96   MCHC 30.7* 32.3 32.6   MONO 4.3  1.5* 3.7*  1.3* 4.7  1.4*   EOSINOPHIL 0.1 0.0 0.0       Recent Labs   Lab 09/08/24  0727 09/09/24  0531 09/10/24  0516   BILITOT 22.3* 23.8* 22.1*   PROT 4.5* 4.9* 4.8*   ALBUMIN 3.3* 3.7 3.6   ALKPHOS 120 133 130   ALT 35 36 34   AST 46* 49* 45*       Recent Labs "   Lab 11/10/21  0047 01/17/22  1244 09/02/24  1817   Color, UA  --   --  Yellow   Appearance, UA  --   --  Clear   pH, UA  --   --  6.0   Specific Gravity, UA  --   --  1.015   Protein, UA  --   --  Trace A   Glucose, UA  --   --  Negative   Ketones, UA  --   --  Negative   Urobilinogen, UA 2 A Normal Negative   Bilirubin (UA)  --   --  2+ A   Occult Blood UA  --   --  Negative   Nitrite, UA  --   --  Negative   RBC, UA 5-10 A 5-10 A  --    WBC, UA 0-2 A 5-10 A  --    Bacteria, UA Trace A 3+ A  --        Recent Labs   Lab 11/10/21  0047 01/17/22  1244   pH 7.5 6.0       Microbiology Results (last 7 days)       Procedure Component Value Units Date/Time    Blood culture [[id number]] Collected: 09/02/24 1656    Order Status: Completed Specimen: Blood from Peripheral, Antecubital, Right Updated: 09/08/24 1032     Blood Culture, Routine No growth after 5 days.    Blood culture [[id number]] Collected: 09/02/24 1615    Order Status: Completed Specimen: Blood from Peripheral, Antecubital, Right Updated: 09/07/24 2032     Blood Culture, Routine No growth after 5 days.    Aerobic culture [[id number]] Collected: 09/03/24 1017    Order Status: Completed Specimen: Peritoneal Fluid from Ascites Updated: 09/06/24 1007     Aerobic Bacterial Culture No growth    Culture, Anaerobic [[id number]] Collected: 09/03/24 1018    Order Status: Completed Specimen: Pleural Fluid from Ascites Updated: 09/05/24 1419     Anaerobic Culture No anaerobes isolated    Gram stain [[id number]] Collected: 09/03/24 1018    Order Status: Completed Specimen: Pleural Fluid from Ascites Updated: 09/04/24 1223     Gram Stain Result No WBC's      No organisms seen      from concentrated specimen          Patient care was time spent personally by me on the following activities: > min    Obtaining a history  Examination of patient.  Providing medical care at the patients bedside.  Developing a treatment plan with patient or surrogate and bedside  caregivers  Ordering and reviewing laboratory studies, radiographic studies, pulse oximetry.  Ordering and performing treatments and interventions.  Evaluation of patient's response to treatment.  Discussions with consultants while on the unit and immediately available to the patient.  Re-evaluation of the patient's condition.  Documentation in the medical record.     Asha Davis NP    [facility] [place]  [phone]

## 2024-09-16 ENCOUNTER — TELEPHONE (OUTPATIENT)
Dept: HEMATOLOGY/ONCOLOGY | Facility: CLINIC | Age: 44
End: 2024-09-16

## 2024-09-16 NOTE — TELEPHONE ENCOUNTER
----- Message from Marti Diaz sent at 9/16/2024 10:12 AM CDT -----  Pt's Mother Ms. Stephenie Glasgow, was in the hospital and said he's calling to schedule a follow up appt. Not sure if he was seen by Maral in the hospital or not. However, his insurance is not one that [facility] accepts. I do know they are allowed a follow up appt., if they are seen by our doctors while in the hospital. I did let the patient know that their insurance isn't one [facility] is contracted with.     # [phone]

## 2024-09-17 ENCOUNTER — TELEPHONE (OUTPATIENT)
Facility: CLINIC | Age: 44
End: 2024-09-17

## 2024-09-17 NOTE — NURSING
Patient's health Insurance is out of our clinic's coverage network; faxed referral to [facility] in [place] where his Health Insurance is within their coverage network.

## 2024-11-27 ENCOUNTER — PATIENT MESSAGE (OUTPATIENT)
Dept: RESEARCH | Facility: HOSPITAL | Age: 44
End: 2024-11-27